# Patient Record
Sex: MALE | Race: ASIAN | NOT HISPANIC OR LATINO
[De-identification: names, ages, dates, MRNs, and addresses within clinical notes are randomized per-mention and may not be internally consistent; named-entity substitution may affect disease eponyms.]

---

## 2018-08-09 ENCOUNTER — TRANSCRIPTION ENCOUNTER (OUTPATIENT)
Age: 58
End: 2018-08-09

## 2018-08-09 ENCOUNTER — INPATIENT (INPATIENT)
Facility: HOSPITAL | Age: 58
LOS: 2 days | Discharge: HOME | End: 2018-08-12
Attending: INTERNAL MEDICINE | Admitting: INTERNAL MEDICINE
Payer: MEDICAID

## 2018-08-09 VITALS
SYSTOLIC BLOOD PRESSURE: 182 MMHG | TEMPERATURE: 98 F | RESPIRATION RATE: 18 BRPM | HEART RATE: 100 BPM | DIASTOLIC BLOOD PRESSURE: 96 MMHG | OXYGEN SATURATION: 100 %

## 2018-08-09 DIAGNOSIS — Z29.9 ENCOUNTER FOR PROPHYLACTIC MEASURES, UNSPECIFIED: ICD-10-CM

## 2018-08-09 DIAGNOSIS — R07.9 CHEST PAIN, UNSPECIFIED: ICD-10-CM

## 2018-08-09 DIAGNOSIS — E13.8 OTHER SPECIFIED DIABETES MELLITUS WITH UNSPECIFIED COMPLICATIONS: ICD-10-CM

## 2018-08-09 DIAGNOSIS — E78.00 PURE HYPERCHOLESTEROLEMIA, UNSPECIFIED: ICD-10-CM

## 2018-08-09 DIAGNOSIS — I25.110 ATHEROSCLEROTIC HEART DISEASE OF NATIVE CORONARY ARTERY WITH UNSTABLE ANGINA PECTORIS: ICD-10-CM

## 2018-08-09 DIAGNOSIS — I16.1 HYPERTENSIVE EMERGENCY: ICD-10-CM

## 2018-08-09 LAB
ALBUMIN SERPL ELPH-MCNC: 3.9 G/DL — SIGNIFICANT CHANGE UP (ref 3.3–5)
ALP SERPL-CCNC: 67 U/L — SIGNIFICANT CHANGE UP (ref 40–120)
ALT FLD-CCNC: 15 U/L — SIGNIFICANT CHANGE UP (ref 4–41)
AST SERPL-CCNC: 15 U/L — SIGNIFICANT CHANGE UP (ref 4–40)
BASOPHILS # BLD AUTO: 0.04 K/UL — SIGNIFICANT CHANGE UP (ref 0–0.2)
BASOPHILS NFR BLD AUTO: 0.4 % — SIGNIFICANT CHANGE UP (ref 0–2)
BILIRUB SERPL-MCNC: 0.2 MG/DL — SIGNIFICANT CHANGE UP (ref 0.2–1.2)
BUN SERPL-MCNC: 14 MG/DL — SIGNIFICANT CHANGE UP (ref 7–23)
CALCIUM SERPL-MCNC: 8.6 MG/DL — SIGNIFICANT CHANGE UP (ref 8.4–10.5)
CHLORIDE SERPL-SCNC: 96 MMOL/L — LOW (ref 98–107)
CO2 SERPL-SCNC: 23 MMOL/L — SIGNIFICANT CHANGE UP (ref 22–31)
CREAT SERPL-MCNC: 0.7 MG/DL — SIGNIFICANT CHANGE UP (ref 0.5–1.3)
EOSINOPHIL # BLD AUTO: 0.27 K/UL — SIGNIFICANT CHANGE UP (ref 0–0.5)
EOSINOPHIL NFR BLD AUTO: 3 % — SIGNIFICANT CHANGE UP (ref 0–6)
GLUCOSE BLDC GLUCOMTR-MCNC: 109 MG/DL — HIGH (ref 70–99)
GLUCOSE BLDC GLUCOMTR-MCNC: 126 MG/DL — HIGH (ref 70–99)
GLUCOSE BLDC GLUCOMTR-MCNC: 167 MG/DL — HIGH (ref 70–99)
GLUCOSE SERPL-MCNC: 187 MG/DL — HIGH (ref 70–99)
HBA1C BLD-MCNC: 8.3 % — HIGH (ref 4–5.6)
HCT VFR BLD CALC: 45.6 % — SIGNIFICANT CHANGE UP (ref 39–50)
HGB BLD-MCNC: 15.3 G/DL — SIGNIFICANT CHANGE UP (ref 13–17)
IMM GRANULOCYTES # BLD AUTO: 0.02 # — SIGNIFICANT CHANGE UP
IMM GRANULOCYTES NFR BLD AUTO: 0.2 % — SIGNIFICANT CHANGE UP (ref 0–1.5)
LIDOCAIN IGE QN: 30 U/L — SIGNIFICANT CHANGE UP (ref 7–60)
LYMPHOCYTES # BLD AUTO: 3.06 K/UL — SIGNIFICANT CHANGE UP (ref 1–3.3)
LYMPHOCYTES # BLD AUTO: 33.6 % — SIGNIFICANT CHANGE UP (ref 13–44)
MAGNESIUM SERPL-MCNC: 1.7 MG/DL — SIGNIFICANT CHANGE UP (ref 1.6–2.6)
MCHC RBC-ENTMCNC: 27 PG — SIGNIFICANT CHANGE UP (ref 27–34)
MCHC RBC-ENTMCNC: 33.6 % — SIGNIFICANT CHANGE UP (ref 32–36)
MCV RBC AUTO: 80.6 FL — SIGNIFICANT CHANGE UP (ref 80–100)
MONOCYTES # BLD AUTO: 0.62 K/UL — SIGNIFICANT CHANGE UP (ref 0–0.9)
MONOCYTES NFR BLD AUTO: 6.8 % — SIGNIFICANT CHANGE UP (ref 2–14)
NEUTROPHILS # BLD AUTO: 5.09 K/UL — SIGNIFICANT CHANGE UP (ref 1.8–7.4)
NEUTROPHILS NFR BLD AUTO: 56 % — SIGNIFICANT CHANGE UP (ref 43–77)
NRBC # FLD: 0 — SIGNIFICANT CHANGE UP
PHOSPHATE SERPL-MCNC: 2.5 MG/DL — SIGNIFICANT CHANGE UP (ref 2.5–4.5)
PLATELET # BLD AUTO: 209 K/UL — SIGNIFICANT CHANGE UP (ref 150–400)
PMV BLD: 11.4 FL — SIGNIFICANT CHANGE UP (ref 7–13)
POTASSIUM SERPL-MCNC: 4.2 MMOL/L — SIGNIFICANT CHANGE UP (ref 3.5–5.3)
POTASSIUM SERPL-SCNC: 4.2 MMOL/L — SIGNIFICANT CHANGE UP (ref 3.5–5.3)
PROT SERPL-MCNC: 6.9 G/DL — SIGNIFICANT CHANGE UP (ref 6–8.3)
RBC # BLD: 5.66 M/UL — SIGNIFICANT CHANGE UP (ref 4.2–5.8)
RBC # FLD: 12.3 % — SIGNIFICANT CHANGE UP (ref 10.3–14.5)
SODIUM SERPL-SCNC: 134 MMOL/L — LOW (ref 135–145)
TROPONIN T, HIGH SENSITIVITY: 22 NG/L — SIGNIFICANT CHANGE UP (ref ?–14)
TROPONIN T, HIGH SENSITIVITY: 34 NG/L — SIGNIFICANT CHANGE UP (ref ?–14)
WBC # BLD: 9.1 K/UL — SIGNIFICANT CHANGE UP (ref 3.8–10.5)
WBC # FLD AUTO: 9.1 K/UL — SIGNIFICANT CHANGE UP (ref 3.8–10.5)

## 2018-08-09 PROCEDURE — 93306 TTE W/DOPPLER COMPLETE: CPT | Mod: 26

## 2018-08-09 PROCEDURE — 93454 CORONARY ARTERY ANGIO S&I: CPT | Mod: 26

## 2018-08-09 PROCEDURE — 71046 X-RAY EXAM CHEST 2 VIEWS: CPT | Mod: 26

## 2018-08-09 RX ORDER — CARVEDILOL PHOSPHATE 80 MG/1
3.12 CAPSULE, EXTENDED RELEASE ORAL EVERY 12 HOURS
Qty: 0 | Refills: 0 | Status: DISCONTINUED | OUTPATIENT
Start: 2018-08-09 | End: 2018-08-10

## 2018-08-09 RX ORDER — HYDRALAZINE HCL 50 MG
10 TABLET ORAL ONCE
Qty: 0 | Refills: 0 | Status: COMPLETED | OUTPATIENT
Start: 2018-08-09 | End: 2018-08-09

## 2018-08-09 RX ORDER — NITROGLYCERIN 6.5 MG
5 CAPSULE, EXTENDED RELEASE ORAL
Qty: 50 | Refills: 0 | Status: DISCONTINUED | OUTPATIENT
Start: 2018-08-09 | End: 2018-08-10

## 2018-08-09 RX ORDER — NITROGLYCERIN 6.5 MG
0.4 CAPSULE, EXTENDED RELEASE ORAL ONCE
Qty: 0 | Refills: 0 | Status: COMPLETED | OUTPATIENT
Start: 2018-08-09 | End: 2018-08-09

## 2018-08-09 RX ORDER — ASPIRIN/CALCIUM CARB/MAGNESIUM 324 MG
325 TABLET ORAL DAILY
Qty: 0 | Refills: 0 | Status: DISCONTINUED | OUTPATIENT
Start: 2018-08-10 | End: 2018-08-10

## 2018-08-09 RX ORDER — GLUCAGON INJECTION, SOLUTION 0.5 MG/.1ML
1 INJECTION, SOLUTION SUBCUTANEOUS ONCE
Qty: 0 | Refills: 0 | Status: DISCONTINUED | OUTPATIENT
Start: 2018-08-09 | End: 2018-08-09

## 2018-08-09 RX ORDER — ATORVASTATIN CALCIUM 80 MG/1
80 TABLET, FILM COATED ORAL AT BEDTIME
Qty: 0 | Refills: 0 | Status: DISCONTINUED | OUTPATIENT
Start: 2018-08-09 | End: 2018-08-12

## 2018-08-09 RX ORDER — DEXTROSE 50 % IN WATER 50 %
25 SYRINGE (ML) INTRAVENOUS ONCE
Qty: 0 | Refills: 0 | Status: DISCONTINUED | OUTPATIENT
Start: 2018-08-09 | End: 2018-08-09

## 2018-08-09 RX ORDER — METOPROLOL TARTRATE 50 MG
25 TABLET ORAL
Qty: 0 | Refills: 0 | Status: DISCONTINUED | OUTPATIENT
Start: 2018-08-09 | End: 2018-08-09

## 2018-08-09 RX ORDER — INSULIN LISPRO 100/ML
VIAL (ML) SUBCUTANEOUS AT BEDTIME
Qty: 0 | Refills: 0 | Status: DISCONTINUED | OUTPATIENT
Start: 2018-08-09 | End: 2018-08-12

## 2018-08-09 RX ORDER — SODIUM CHLORIDE 9 MG/ML
1000 INJECTION, SOLUTION INTRAVENOUS
Qty: 0 | Refills: 0 | Status: DISCONTINUED | OUTPATIENT
Start: 2018-08-09 | End: 2018-08-12

## 2018-08-09 RX ORDER — POLYETHYLENE GLYCOL 3350 17 G/17G
17 POWDER, FOR SOLUTION ORAL DAILY
Qty: 0 | Refills: 0 | Status: DISCONTINUED | OUTPATIENT
Start: 2018-08-09 | End: 2018-08-12

## 2018-08-09 RX ORDER — ACETAMINOPHEN 500 MG
650 TABLET ORAL EVERY 6 HOURS
Qty: 0 | Refills: 0 | Status: DISCONTINUED | OUTPATIENT
Start: 2018-08-09 | End: 2018-08-12

## 2018-08-09 RX ORDER — NITROGLYCERIN 6.5 MG
1 CAPSULE, EXTENDED RELEASE ORAL ONCE
Qty: 0 | Refills: 0 | Status: COMPLETED | OUTPATIENT
Start: 2018-08-09 | End: 2018-08-09

## 2018-08-09 RX ORDER — DEXTROSE 50 % IN WATER 50 %
15 SYRINGE (ML) INTRAVENOUS ONCE
Qty: 0 | Refills: 0 | Status: DISCONTINUED | OUTPATIENT
Start: 2018-08-09 | End: 2018-08-09

## 2018-08-09 RX ORDER — INSULIN LISPRO 100/ML
VIAL (ML) SUBCUTANEOUS
Qty: 0 | Refills: 0 | Status: DISCONTINUED | OUTPATIENT
Start: 2018-08-09 | End: 2018-08-12

## 2018-08-09 RX ORDER — DEXTROSE 50 % IN WATER 50 %
12.5 SYRINGE (ML) INTRAVENOUS ONCE
Qty: 0 | Refills: 0 | Status: DISCONTINUED | OUTPATIENT
Start: 2018-08-09 | End: 2018-08-09

## 2018-08-09 RX ADMIN — Medication 100 MILLIGRAM(S): at 22:24

## 2018-08-09 RX ADMIN — Medication 1 INCH(S): at 11:36

## 2018-08-09 RX ADMIN — Medication 0.4 MILLIGRAM(S): at 04:29

## 2018-08-09 RX ADMIN — Medication 10 MILLIGRAM(S): at 08:15

## 2018-08-09 RX ADMIN — ATORVASTATIN CALCIUM 80 MILLIGRAM(S): 80 TABLET, FILM COATED ORAL at 22:13

## 2018-08-09 RX ADMIN — Medication 650 MILLIGRAM(S): at 22:54

## 2018-08-09 RX ADMIN — Medication 1 INCH(S): at 09:37

## 2018-08-09 RX ADMIN — Medication 1.5 MICROGRAM(S)/MIN: at 10:43

## 2018-08-09 RX ADMIN — CARVEDILOL PHOSPHATE 3.12 MILLIGRAM(S): 80 CAPSULE, EXTENDED RELEASE ORAL at 18:00

## 2018-08-09 RX ADMIN — Medication 1.5 MICROGRAM(S)/MIN: at 19:26

## 2018-08-09 RX ADMIN — Medication 650 MILLIGRAM(S): at 22:24

## 2018-08-09 RX ADMIN — Medication 25 MILLIGRAM(S): at 09:37

## 2018-08-09 NOTE — ED ADULT NURSE NOTE - NSIMPLEMENTINTERV_GEN_ALL_ED
Implemented All Universal Safety Interventions:  [place] to call system. Call bell, personal items and telephone within reach. Instruct patient to call for assistance. Room bathroom lighting operational. Non-slip footwear when patient is off stretcher. Physically safe environment: no spills, clutter or unnecessary equipment. Stretcher in lowest position, wheels locked, appropriate side rails in place.

## 2018-08-09 NOTE — ED PROVIDER NOTE - OBJECTIVE STATEMENT
58M PMH HTN, DM, HLD, ex-smoker, +FHx CAD presents with midsternal CP that woke him up from sleep 3 hours ago. No known cardiac hx. Visiting from [facility] for several months. CP lasted 3 hours a/w diaphoresis and SOB. Resolved after receiving medications by EMS. No nausea, vomiting, lightheadedness, dizziness. Reports some numbness in L 5th digit that improved. Takes antihypertensive meds; not complaint every day. 58M PMH HTN, DM, HLD, ex-smoker, +FHx CAD presents with midsternal CP that woke him up from sleep 3 hours ago. No known cardiac hx. Visiting from [facility] for several months. CP lasted 3 hours a/w diaphoresis and SOB. Resolved after receiving medications by EMS. No nausea, vomiting, lightheadedness, dizziness. Reports some numbness in L 5th digit that improved. Takes antihypertensive meds; not complaint every day.  [place]: 58M PMH HTN, DM, HLD p/w midsternal CP, awoke from sleep, lasted ~3hrs then resolved after asa 162 and SLN from EMS. EMS also noted SBP 200s. Currently asymptomatic. Associated w/ slight SOB and diaphoresis. Also minimal numbness only to L pinky, now resolved, no other weakness/numbness. Has similar intermittent episodes over alst few mos but not as severe, pt attributed them to "gas pain." Denies associated  NVD, lightheaded, palpitations, cough/rhinorrhea, black/bloody stool, LE pain/swelling, focal weakness/numbness, abd pain, urinary complaints, f/c. No prior cardiac w/u. Not always adherent to BP meds, unsure what they are.

## 2018-08-09 NOTE — DISCHARGE NOTE ADULT - OTHER SIGNIFICANT FINDINGS
8/9:  - CP 10/10 w /120 in ED, given nitro SL x2 doses and nitro ointment which reduced his CP from 10/10 to 4/10. Gave hydralazine 10mg x1, started metoprolol 25 mg bid, and atorvastatin 80mg qD. Admitted to tele to r/o ACS, then transferred to CCU. Troponin 22 --> 34, EKG concerning for inferolateral ischemia, CP improved to 2/10 on admission to CCU, however BP still elevated. Started on nitro gtt, ordered TTE, and kept NPO for possible cath  - TTE w LVEF 62%, increased relative wall thickness with normal LV mass index c/w concentric LV remodeling.      RESULTS:    TTE 8/9:  - LVEF 62%  1. Calcified trileaflet aortic valve with normal opening.  2. Normal left atrium.  LA volume index = 14 cc/m2.  3. Increased relative wall thickness with normal left ventricular mass index, consistent with concentric left ventricular remodeling.  4. Normal left ventricular systolic function. No segmental wall motion abnormalities.  5. Normal right ventricular size and function.  6. Normal tricuspid valve. No tricuspid regurgitation.  7. Normal pericardium with trace pericardial effusion.    EKG: Sinus Tachycardia @ 101 bpm TWI I, AVL, V5-V6, II, III.  Trop: 22-->34  CXR 8/9: clear lungs.

## 2018-08-09 NOTE — DISCHARGE NOTE ADULT - PLAN OF CARE
To be asymptomatic, to reduce risks factors such as hypertension, diabetes and hyperlipidemia to lower the risk of blood clots formation; and to prevent complications of coronary artery disease such as worsening chest pain, heart attack and death. Continue aspirin and Plavix, do not stop unless instructed by your physician.  Continue low salt, fat, cholesterol and carbohydrate diet. Follow up with cardiologist and primary care physician's routine appointment. To maintain a normal blood pressure to prevent heart attack, stroke and renal failure. Low sodium and fat diet, continue anti-hypertensive medications, and follow up with primary care physician. To maintain normal cholesterol levels to prevent stroke, coronary artery disease, peripheral vascular disease and heart attacks. Low fat diet, exercise daily and continue current medications. Follow up with primary care physician and cardiologist for management.

## 2018-08-09 NOTE — H&P ADULT - ASSESSMENT
57 yo male PMHx HTN, DM, and X-smoker p/w chest pain woke him up from sleep accompanied by nausea, vomiting and diaphoresis, found to have uncontrolled /120 admitted to [facility] for Unstable Angina and HTN Emergency.    +Unstable Angina-->[place], EKGs, Nitro paste, TTE  +HTN Emergency-->s/p nitro paste, will add antihypertensive med after nitro reassessment., TTE.

## 2018-08-09 NOTE — ED PROVIDER NOTE - PHYSICAL EXAMINATION
General appearance: NAD, conversant, afebrile    Eyes: anicteric sclerae, moist conjunctivae   HENT: Atraumatic; oropharynx clear with moist mucous membranes and no mucosal ulcerations; normal hard and soft palate; no pharyngeal erythema or exudate   Neck: Trachea midline;  Pulmonary: normal work of breathing.    CV: Regular Rhythm. Tachycardic; Normal S1, S2; No murmurs, rubs, or gallops. 2+ radial peripheral pulses   Abdomen: Soft, non-tender, non-distended; no masses or hepatosplenomegaly.   Extremities: No peripheral edema or extremity lymphadenopathy. 5/5 strength in all four extremities.   Skin: Normal temperature, turgor and texture; no rash, ulcers or subcutaneous nodules

## 2018-08-09 NOTE — ED PROVIDER NOTE - NS ED ROS FT
General: denies fever, chills  HENT: denies nasal congestion  Eyes: denies visual changes, blurred vision, eye discharge, eye redness  Neck: denies neck pain, neck swelling  CV: see HPI  Resp: see HPI  Abdominal: denies nausea, vomiting, diarrhea, abdominal pain, blood in stool, dark stool  MSK: denies muscle aches, bony pain, leg pain, leg swelling  Neuro: denies headaches, numbness, tingling, dizziness, lightheadedness.

## 2018-08-09 NOTE — DISCHARGE NOTE ADULT - CARE PROVIDER_API CALL
Willy Flores), Cardiovascular Disease; Internal Medicine; Interventional Cardiology  [id number] [address] [place] [address]  [place] [address]  Phone: [phone]  Fax: [phone]

## 2018-08-09 NOTE — ED PROVIDER NOTE - ATTENDING CONTRIBUTION TO CARE
58M PMH HTN, DM, HLD, FMH CAD p/w midsternal CP/diaphoresis/SOB for last few hrs, received asa/SLN, now resolved and asymptomatic. Slight tachycardia and hypertension (but improved from EMS), other vitals wnl. Exam as above. EKG w/ TWI.  Ddx: Concern for ACS. clinically not PE, tamponade, dissection, PTX, perf, myocarditis, mediastinitis.   CBC, cmp, trop. CXR. Reassess. High risk pt, likely admission for further cardiac w/u.

## 2018-08-09 NOTE — DISCHARGE NOTE ADULT - PATIENT PORTAL LINK FT
You can access the [facility] Portal, offered by [facility], by registering with the following website: [email or web address]

## 2018-08-09 NOTE — PROGRESS NOTE ADULT - SUBJECTIVE AND OBJECTIVE BOX
----- CCU ADMISSION NOTE -----    CHIEF COMPLAINT: Chest pain    HPI:   59 yo male PMHx HTN, DM, and X-smoker p/w chest pain woke him up from sleep this morning, Chest pain described as substernal chest heaviness, 10/10, non-pleuritic, non-radiating, accompanied by nausea, vomiting and diaphoresis. Chest pain constant in nature. Pt took his Aspirin 325mg this morning and came to the hospital. Pt denies fever, chills, SOB, palpitations, or abdominal pain. In ED pt found to have uncontrolled /120, was given Nitro SL x2 doses which reduced his chest pain to 4/10 and admitted to tele to r/o ACS. CE negative, EKG concerning for inferolateral ischemia, CP improved from 8/10 to 2/10, however BP still 198/110 after nitro paste and metoprolol, started on nitro gtt with plans for TTE and cath later today.      ALLERGIES:  No Known Allergies      PAST MEDICAL HISTORY:  Hypercholesteremia    Hypertension, unspecified type    Other specified diabetes mellitus with complication, without long-term current use of insulin.    PAST SURGICAL HISTORY:  No significant past surgical history.      FAMILY HISTORY:  CAD    SOCIAL HISTORY:  Pt , lives with spouse.   Former smoker. Drinks 4-5 shots of liquor on occasion.    OBJECTIVE:  Vital Signs Last 24 Hrs  T(C): 36.9 (09 Aug 2018 04:31), Max: 36.9 (09 Aug 2018 02:39)  T(F): 98.4 (09 Aug 2018 04:31), Max: 98.4 (09 Aug 2018 02:39)  HR: 94 (09 Aug 2018 09:24) (90 - 100)  BP: 198/110 (09 Aug 2018 09:24) (176/98 - 204/120)  BP(mean): --  RR: 19 (09 Aug 2018 09:24) (17 - 19)  SpO2: 100% (09 Aug 2018 09:24) (98% - 100%)  I&O's Detail      PHYSICAL EXAM:    ***      EKG 8/9/18:  Rate 101 BPM  P-R Interval 176 ms  QRS Duration 94 ms  Q-T Interval 344 ms  QTC Calculation(Bezet) 446 ms  P Axis 59 degrees  R Axis 2 degrees  T Axis 176 degrees  Diagnosis: Sinus tachycardia. T wave abnormality, consider inferolateral ischemia      HOME MEDICATIONS:    Ecotrin 325 mg oral delayed release tablet: 1 tab(s) orally once a day (09 Aug 2018 09:01)  metFORMIN 500 mg oral tablet: 1 tab(s) orally 2 times a day (09 Aug 2018 09:01)      HOSPITAL MEDICATIONS:    MEDICATIONS  (STANDING):  atorvastatin 80 milliGRAM(s) Oral at bedtime  dextrose 50% Injectable 12.5 Gram(s) IV Push once  dextrose 50% Injectable 25 Gram(s) IV Push once  dextrose 50% Injectable 25 Gram(s) IV Push once  metoprolol tartrate 25 milliGRAM(s) Oral two times a day    MEDICATIONS  (PRN):  dextrose 40% Gel 15 Gram(s) Oral once PRN Blood Glucose LESS THAN 70 milliGRAM(s)/deciliter  glucagon  Injectable 1 milliGRAM(s) IntraMuscular once PRN Glucose LESS THAN 70 milligrams/deciliter      LABS:                        15.3   9.10  )-----------( 209      ( 09 Aug 2018 04:24 )             45.6     Hgb Trend: 15.3<--    09 Aug 2018 04:24    134<L>  |  96<L>  |  14     ----------------------------<  187<H>  4.2     |  23     |  0.70     Ca    8.6        09 Aug 2018 04:24  Phos  2.5       09 Aug 2018 04:24  Mg     1.7       09 Aug 2018 04:24    TPro  6.9    /  Alb  3.9    /  TBili  0.2    /  DBili  x      /  AST  15     /  ALT  15     /  AlkPhos  67     09 Aug 2018 04:24    Troponin T, High Sensitivity: 34 --> 22      MICROBIOLOGY: None      IMAGING:    CXR 8/9/18: Clear lungs. ----- CCU ADMISSION NOTE -----    CHIEF COMPLAINT: Chest pain    HPI:   57 yo male PMHx HTN, DM, and X-smoker p/w chest pain woke him up from sleep this morning, Chest pain described as substernal chest heaviness, 10/10, non-pleuritic, non-radiating, accompanied by nausea, vomiting and diaphoresis. Chest pain constant in nature. Pt took his Aspirin 325mg this morning and came to the hospital. Pt denies fever, chills, SOB, palpitations, or abdominal pain. In ED pt found to have uncontrolled /120, was given Nitro SL x2 doses which reduced his chest pain to 4/10 and admitted to tele to r/o ACS. CE negative, EKG concerning for inferolateral ischemia, CP improved from 8/10 to 2/10, however BP still 198/110 after nitro paste and metoprolol, started on nitro gtt with plans for TTE and cath later today.      ALLERGIES:  No Known Allergies      PAST MEDICAL HISTORY:  Hypercholesteremia    Hypertension, unspecified type    Other specified diabetes mellitus with complication, without long-term current use of insulin.    PAST SURGICAL HISTORY:  No significant past surgical history.      FAMILY HISTORY:  CAD    SOCIAL HISTORY:  Pt , lives with spouse.   Former smoker. Drinks 4-5 shots of liquor on occasion.    OBJECTIVE:  Vital Signs Last 24 Hrs  T(C): 36.9 (09 Aug 2018 04:31), Max: 36.9 (09 Aug 2018 02:39)  T(F): 98.4 (09 Aug 2018 04:31), Max: 98.4 (09 Aug 2018 02:39)  HR: 94 (09 Aug 2018 09:24) (90 - 100)  BP: 198/110 (09 Aug 2018 09:24) (176/98 - 204/120)  BP(mean): --  RR: 19 (09 Aug 2018 09:24) (17 - 19)  SpO2: 100% (09 Aug 2018 09:24) (98% - 100%)  I&O's Detail    PHYSICAL EXAM:  General: Alert and cooperative. No acute stress. Well developed, well nourished.   Head: Normocephalic, no mass or lesions.  Eyes: Intact visual fields. PERRL, clear conjunctiva, EOMI, no ptosis.   Neck: No lymphadenopathy, no masses, no thyromegaly. No JVD.  Respiratory: Bilateral lungs clear to auscultation, no crackles, wheezes, or rhonchi.   Cardiovascular: S1/S2 auscultated. Regular rhythm. No murmurs, rubs or gallop. No peripheral edema, cyanosis, or pallor. Extremities warm and well perfused.  Abdomen: Soft, non-tender, nondistended, no guarding or rebound tenderness. Active bowel sounds in all 4 quadrants. No masses palpated.  Extremities: No significant deformity or joint abnormality. Peripheral pulses intact. No varicosities. No peripheral edema, atrophy.   Skin: Intact, no rash. Normal color, texture and turgor with no lesions or eruptions.  Neurological: AOAx3. CN2-12 grossly intact. Strength and sensation grossly symmetric and intact throughout.  Psychiatry: Oriented to person, place, and time. Able to demonstrate good judgement and reason, without hallucinations, abnormal affect or abnormal behaviors during the examination.      EKG 8/9/18:  Rate 101 BPM  P-R Interval 176 ms  QRS Duration 94 ms  Q-T Interval 344 ms  QTC Calculation(Bezet) 446 ms  P Axis 59 degrees  R Axis 2 degrees  T Axis 176 degrees  Diagnosis: Sinus tachycardia. T wave abnormality, consider inferolateral ischemia      HOME MEDICATIONS:    Ecotrin 325 mg oral delayed release tablet: 1 tab(s) orally once a day (09 Aug 2018 09:01)  metFORMIN 500 mg oral tablet: 1 tab(s) orally 2 times a day (09 Aug 2018 09:01)      HOSPITAL MEDICATIONS:    MEDICATIONS  (STANDING):  atorvastatin 80 milliGRAM(s) Oral at bedtime  dextrose 50% Injectable 12.5 Gram(s) IV Push once  dextrose 50% Injectable 25 Gram(s) IV Push once  dextrose 50% Injectable 25 Gram(s) IV Push once  metoprolol tartrate 25 milliGRAM(s) Oral two times a day    MEDICATIONS  (PRN):  dextrose 40% Gel 15 Gram(s) Oral once PRN Blood Glucose LESS THAN 70 milliGRAM(s)/deciliter  glucagon  Injectable 1 milliGRAM(s) IntraMuscular once PRN Glucose LESS THAN 70 milligrams/deciliter      LABS:                        15.3   9.10  )-----------( 209      ( 09 Aug 2018 04:24 )             45.6     Hgb Trend: 15.3<--    09 Aug 2018 04:24    134<L>  |  96<L>  |  14     ----------------------------<  187<H>  4.2     |  23     |  0.70     Ca    8.6        09 Aug 2018 04:24  Phos  2.5       09 Aug 2018 04:24  Mg     1.7       09 Aug 2018 04:24    TPro  6.9    /  Alb  3.9    /  TBili  0.2    /  DBili  x      /  AST  15     /  ALT  15     /  AlkPhos  67     09 Aug 2018 04:24    Troponin T, High Sensitivity: 34 --> 22      MICROBIOLOGY: None      IMAGING:    CXR 8/9/18: Clear lungs. ----- CCU ADMISSION NOTE -----    CHIEF COMPLAINT: Chest pain    HPI:   59 yo male PMHx HTN, and ex-smoker p/w chest pain woke him up from sleep this morning, chest pain described as substernal chest heaviness, 10/10, non-pleuritic, non-radiating, accompanied by nausea, vomiting and diaphoresis. Chest pain constant in nature. Pt reports feeling of chest pressure which first started 1 month ago w/o chest pain. Reports his only home mets are aspirin 325 and metformin 500mg bid, reports taking his aspirin this morning before coming to the hospital.    In ED pt found to have uncontrolled /120, was given nitro SL x2 doses and nitro ointment which reduced his chest pain to 4/10. Gave hydralazine 10mg x1, started metoprolol 25 mg bid and atorvastatin 80mg qD. Admitted to tele to r/o ACS, then transferred to CCU. Troponin 22 --> 34, EKG concerning for inferolateral ischemia, CP improved to 2/10 on admission to CCU, however BP still 198/110. Started on nitro gtt with plans for TTE and cath later today.    Pt denies fever, chills, SOB, palpitations, or abdominal pain. Denies n/v since this am. Denies prior cardiac history or cardiologist. Reports constipation.      ALLERGIES:  No Known Allergies      PAST MEDICAL HISTORY:  Hypercholesteremia    Hypertension, unspecified type    Other specified diabetes mellitus with complication, without long-term current use of insulin.      PAST SURGICAL HISTORY:  No past surgical history      SOCIAL HISTORY:  Pt , lives with spouse and daughter.   Former smoker. Drinks 4-5 shots of liquor on occasion.      OBJECTIVE:  Vital Signs Last 24 Hrs  T(C): 36.9 (09 Aug 2018 04:31), Max: 36.9 (09 Aug 2018 02:39)  T(F): 98.4 (09 Aug 2018 04:31), Max: 98.4 (09 Aug 2018 02:39)  HR: 94 (09 Aug 2018 09:24) (90 - 100)  BP: 198/110 (09 Aug 2018 09:24) (176/98 - 204/120)  BP(mean): --  RR: 19 (09 Aug 2018 09:24) (17 - 19)  SpO2: 100% (09 Aug 2018 09:24) (98% - 100%)  I&O's Detail    PHYSICAL EXAM:  General: Alert and cooperative. No acute stress. Well developed, well nourished.   Head: Normocephalic, no mass or lesions.  Eyes: Intact visual fields. PERRL, clear conjunctiva, EOMI, no ptosis.   Neck: No lymphadenopathy, no masses, no thyromegaly. No JVD.  Respiratory: Bilateral lungs clear to auscultation, no crackles, wheezes, or rhonchi.   Cardiovascular: S1/S2 auscultated. Regular rhythm. No murmurs, rubs or gallop. No peripheral edema, cyanosis, or pallor. Extremities warm and well perfused.  Abdomen: Soft, non-tender, nondistended, no guarding or rebound tenderness. Active bowel sounds in all 4 quadrants. No masses palpated.  Extremities: No significant deformity or joint abnormality. Peripheral pulses intact. No varicosities. No peripheral edema, atrophy.   Skin: Intact, no rash. Normal color, texture and turgor with no lesions or eruptions.  Neurological: AOAx3. CN2-12 grossly intact. Strength and sensation grossly symmetric and intact throughout.  Psychiatry: Oriented to person, place, and time. Able to demonstrate good judgement and reason, without hallucinations, abnormal affect or abnormal behaviors during the examination.      EKG 8/9/18:  Rate 101 BPM  P-R Interval 176 ms  QRS Duration 94 ms  Q-T Interval 344 ms  QTC Calculation(Bezet) 446 ms  P Axis 59 degrees  R Axis 2 degrees  T Axis 176 degrees  Diagnosis: Sinus tachycardia. T wave abnormality, consider inferolateral ischemia      HOME MEDICATIONS: (confirmed with patient)    Ecotrin 325 mg oral delayed release tablet: 1 tab(s) orally once a day (09 Aug 2018 09:01)  metFORMIN 500 mg oral tablet: 1 tab(s) orally 2 times a day (09 Aug 2018 09:01)      HOSPITAL MEDICATIONS:    MEDICATIONS  (STANDING):  atorvastatin 80 milliGRAM(s) Oral at bedtime  dextrose 50% Injectable 12.5 Gram(s) IV Push once  dextrose 50% Injectable 25 Gram(s) IV Push once  dextrose 50% Injectable 25 Gram(s) IV Push once  metoprolol tartrate 25 milliGRAM(s) Oral two times a day    MEDICATIONS  (PRN):  dextrose 40% Gel 15 Gram(s) Oral once PRN Blood Glucose LESS THAN 70 milliGRAM(s)/deciliter  glucagon  Injectable 1 milliGRAM(s) IntraMuscular once PRN Glucose LESS THAN 70 milligrams/deciliter      LABS:                        15.3   9.10  )-----------( 209      ( 09 Aug 2018 04:24 )             45.6     Hgb Trend: 15.3<--    09 Aug 2018 04:24    134<L>  |  96<L>  |  14     ----------------------------<  187<H>  4.2     |  23     |  0.70     Ca    8.6        09 Aug 2018 04:24  Phos  2.5       09 Aug 2018 04:24  Mg     1.7       09 Aug 2018 04:24    TPro  6.9    /  Alb  3.9    /  TBili  0.2    /  DBili  x      /  AST  15     /  ALT  15     /  AlkPhos  67     09 Aug 2018 04:24    Troponin T, High Sensitivity: 22 --> 34      MICROBIOLOGY: None      IMAGING:    CXR 8/9/18: Clear lungs.    TTE pending ----- CCU ADMISSION NOTE -----    CHIEF COMPLAINT: Chest pain    HPI:   59 yo male PMHx HTN, and ex-smoker p/w chest pain woke him up from sleep this morning, chest pain described as substernal chest heaviness, 10/10, non-pleuritic, non-radiating, accompanied by nausea, vomiting and diaphoresis. Chest pain constant in nature. Pt reports feeling of chest pressure which first started 1 month ago w/o chest pain. Reports his only home mets are aspirin 325 and metformin 500mg bid, reports taking his aspirin this morning before coming to the hospital.    In ED pt found to have uncontrolled /120, was given nitro SL x2 doses and nitro ointment which reduced his chest pain to 4/10. Gave hydralazine 10mg x1, started metoprolol 25 mg bid and atorvastatin 80mg qD. Admitted to tele to r/o ACS, then transferred to CCU. Troponin 22 --> 34, EKG concerning for inferolateral ischemia, CP improved to 2/10 on admission to CCU, however BP still elevated. Started on nitro gtt with plans for TTE and cath later today.    Pt denies fever, chills, SOB, palpitations, or abdominal pain. Denies n/v since this am. Denies prior cardiac history or cardiologist. Reports constipation.      ALLERGIES:  No Known Allergies      PAST MEDICAL HISTORY:  Hypercholesteremia    Hypertension, unspecified type    Other specified diabetes mellitus with complication, without long-term current use of insulin.      PAST SURGICAL HISTORY:  No past surgical history      SOCIAL HISTORY:  Pt , lives with spouse and daughter.   Former smoker. Drinks 4-5 shots of liquor on occasion.      OBJECTIVE:  Vital Signs Last 24 Hrs  T(C): 36.9 (09 Aug 2018 04:31), Max: 36.9 (09 Aug 2018 02:39)  T(F): 98.4 (09 Aug 2018 04:31), Max: 98.4 (09 Aug 2018 02:39)  HR: 94 (09 Aug 2018 09:24) (90 - 100)  BP: 198/110 (09 Aug 2018 09:24) (176/98 - 204/120)  BP(mean): --  RR: 19 (09 Aug 2018 09:24) (17 - 19)  SpO2: 100% (09 Aug 2018 09:24) (98% - 100%)  I&O's Detail    PHYSICAL EXAM:  General: Alert and cooperative. No acute stress. Well developed, well nourished.   Head: Normocephalic, no mass or lesions.  Eyes: Intact visual fields. PERRL, clear conjunctiva, EOMI, no ptosis.   Neck: No lymphadenopathy, no masses, no thyromegaly. No JVD.  Respiratory: Bilateral lungs clear to auscultation, no crackles, wheezes, or rhonchi.   Cardiovascular: S1/S2 auscultated. Regular rhythm. No murmurs, rubs or gallop. No peripheral edema, cyanosis, or pallor. Extremities warm and well perfused.  Abdomen: Soft, non-tender, nondistended, no guarding or rebound tenderness. Active bowel sounds in all 4 quadrants. No masses palpated.  Extremities: No significant deformity or joint abnormality. Peripheral pulses intact. No varicosities. No peripheral edema, atrophy.   Skin: Intact, no rash. Normal color, texture and turgor with no lesions or eruptions.  Neurological: AOAx3. CN2-12 grossly intact. Strength and sensation grossly symmetric and intact throughout.  Psychiatry: Oriented to person, place, and time. Able to demonstrate good judgement and reason, without hallucinations, abnormal affect or abnormal behaviors during the examination.      EKG 8/9/18:  Rate 101 BPM  P-R Interval 176 ms  QRS Duration 94 ms  Q-T Interval 344 ms  QTC Calculation(Bezet) 446 ms  P Axis 59 degrees  R Axis 2 degrees  T Axis 176 degrees  Diagnosis: Sinus tachycardia. T wave abnormality, consider inferolateral ischemia      HOME MEDICATIONS: (confirmed with patient)    Ecotrin 325 mg oral delayed release tablet: 1 tab(s) orally once a day (09 Aug 2018 09:01)  metFORMIN 500 mg oral tablet: 1 tab(s) orally 2 times a day (09 Aug 2018 09:01)      HOSPITAL MEDICATIONS:    MEDICATIONS  (STANDING):  atorvastatin 80 milliGRAM(s) Oral at bedtime  dextrose 50% Injectable 12.5 Gram(s) IV Push once  dextrose 50% Injectable 25 Gram(s) IV Push once  dextrose 50% Injectable 25 Gram(s) IV Push once  metoprolol tartrate 25 milliGRAM(s) Oral two times a day    MEDICATIONS  (PRN):  dextrose 40% Gel 15 Gram(s) Oral once PRN Blood Glucose LESS THAN 70 milliGRAM(s)/deciliter  glucagon  Injectable 1 milliGRAM(s) IntraMuscular once PRN Glucose LESS THAN 70 milligrams/deciliter      LABS:                        15.3   9.10  )-----------( 209      ( 09 Aug 2018 04:24 )             45.6     Hgb Trend: 15.3<--    09 Aug 2018 04:24    134<L>  |  96<L>  |  14     ----------------------------<  187<H>  4.2     |  23     |  0.70     Ca    8.6        09 Aug 2018 04:24  Phos  2.5       09 Aug 2018 04:24  Mg     1.7       09 Aug 2018 04:24    TPro  6.9    /  Alb  3.9    /  TBili  0.2    /  DBili  x      /  AST  15     /  ALT  15     /  AlkPhos  67     09 Aug 2018 04:24    Troponin T, High Sensitivity: 22 --> 34      MICROBIOLOGY: None      IMAGING:    CXR 8/9/18: Clear lungs.    TTE pending

## 2018-08-09 NOTE — ED PROVIDER NOTE - PMH
Hypercholesteremia    Hypertension, unspecified type    Other specified diabetes mellitus with complication, without long-term current use of insulin

## 2018-08-09 NOTE — CHART NOTE - NSCHARTNOTEFT_GEN_A_CORE
R radial band removed, good hemostasis noted, DSD in place. No evidence of hematoma. R radial pulse is 2+ palp, capillary refill < 3 sec, good ROJM.

## 2018-08-09 NOTE — DISCHARGE NOTE ADULT - SECONDARY DIAGNOSIS.
Hypertensive emergency Hypercholesteremia Other specified diabetes mellitus with complication, without long-term current use of insulin

## 2018-08-09 NOTE — H&P ADULT - RS GEN PE MLT RESP DETAILS PC
no chest wall tenderness/no rhonchi/airway patent/no rales/no wheezes/clear to auscultation bilaterally/good air movement/breath sounds equal/respirations non-labored

## 2018-08-09 NOTE — ED ADULT NURSE NOTE - OBJECTIVE STATEMENT
received pt to room 24. pt is alert and oriented x4. c/o chest pain and diaphoresis which is better after he received Nitro SL by EMS. labs sent. 20 G SL in Left AC by EMS. NSr on monitor. family at bedside. pt appears comfortable. Pt is being monitored. received pt to room 24. pt is alert and oriented x4. c/o chest pain and diaphoresis which is better after he received Nitro SL by EMS. labs sent. 20 G SL in Left AC by EMS. NSr on monitor. family at bedside. pt appears comfortable. Pt is being monitored. scleroderma noted on his body. offers no other complaints.

## 2018-08-09 NOTE — H&P ADULT - PROBLEM SELECTOR PLAN 1
tele monitor   cardiac enzymes x 2  Serial EKGs  NPO for now  continue ASA  Started Metoprolol 25mg PO BID, Lipitor 80mg QHS  TTE  consider Cardiac cath

## 2018-08-09 NOTE — DISCHARGE NOTE ADULT - MEDICATION SUMMARY - MEDICATIONS TO TAKE
I will START or STAY ON the medications listed below when I get home from the hospital:    aspirin 81 mg oral delayed release tablet  -- 1 tab(s) by mouth once a day  -- Indication: For preventative    isosorbide mononitrate 30 mg oral tablet, extended release  -- 1 tab(s) by mouth once a day  -- Indication: For CAD    metFORMIN 500 mg oral tablet  -- 1 tab(s) by mouth 2 times a day  -- Indication: For Other specified diabetes mellitus with complication, without long-term current use of insulin    atorvastatin 80 mg oral tablet  -- 1 tab(s) by mouth once a day (at bedtime)  -- Indication: For HLD    carvedilol 25 mg oral tablet  -- 1 tab(s) by mouth every 12 hours  -- Indication: For HTN

## 2018-08-09 NOTE — PATIENT PROFILE ADULT. - TEACHING/LEARNING DEVELOPMENTAL CONSIDERATIONS
Ambulatory/Rehab Services H2 Model Falls Risk Assessment    Risk Factor Pts. ·   Confusion/Disorientation/Impulsivity  []    4 ·   Symptomatic Depression  []   2 ·   Altered Elimination  []   1 ·   Dizziness/Vertigo  []   1 ·   Gender (Male)  []   1 ·   Any administered antiepileptics (anticonvulsants):  []   2 ·   Any administered benzodiazepines:  []   1 ·   Visual Impairment (specify):  []   1 ·   Portable Oxygen Use  []   1 ·   Orthostatic ? BP  []   1 ·   History of Recent Falls (within 3 mos.)  []   5     Ability to Rise from Chair (choose one) Pts. ·   Ability to rise in a single movement  [x]   0 ·   Pushes up, successful in one attempt  []   1 ·   Multiple attempts, but successful  []   3 ·   Unable to rise without assistance  []   4   Total: (5 or greater = High Risk) 0     Falls Prevention Plan:   []                Physical Limitations to Exercise (specify):   []                Mobility Assistance Device (type):   []                Exercise/Equipment Adaptation (specify):    ©2010 [facility] [address] [place] [address] Patent #[id number].  Federal Law prohibits the replication, distribution or use without written permission from [facility] [employer] none

## 2018-08-09 NOTE — DISCHARGE NOTE ADULT - MEDICATION SUMMARY - MEDICATIONS TO CHANGE
I will SWITCH the dose or number of times a day I take the medications listed below when I get home from the hospital:    Ecotrin 325 mg oral delayed release tablet  -- 1 tab(s) by mouth once a day

## 2018-08-09 NOTE — ED ADULT TRIAGE NOTE - CHIEF COMPLAINT QUOTE
PT brought in by EMS from home for CP, diaphoresis that woke him from sleep. PMH HTN, DM. As per EMS Pt hypertensive en route with SBP in 200's: given 1 sublingual nitro and 162 aspirin by EMS en route. Pt received with 20 G IV to left arm from EMS.

## 2018-08-09 NOTE — DISCHARGE NOTE ADULT - HOSPITAL COURSE
57 yo male PMHx HTN, DM, and X-smoker p/w chest pain woke him up from sleep this morning, Chest pain described as substernal chest heaviness, 10/10, non-pleuritic, non-radiating, accompanied by nausea, vomiting and diaphoresis. Chest pain constant in nature. Pt took his Aspirin 325mg and came to the hospital. Pt denied fever, chills, SOB, palpitations, or abdominal pain. In ED pt found to have uncontrolled /120, was given Nitro SL x2 doses which reduced his chest pain to 4/10 and admitted to tele to r/o ACS. Trop 22 --> 34, EKG concerning for inferolateral ischemia, CP improved to 2/10, however BP still 198/110 after nitro paste and metoprolol, started on nitro gtt with plans for TTE and cath.    Cath performed on 8/**/18 revealed ***    Pt remained afebrile and hemodynamically stable, and was discharged 8/**/18 with plans to... *** 59 yo male PMHx HTN, DM, former smoker a/w  Unstable Angina and HTN Emergency:   admitted with hypertensive urgency and unstable angina s/p cath 8/9:     -- TTE with normal LV function with no significant valve disease  -- Cath noted - patient with moderate multivessel disease not amenable to stenting  -- no more angina  -- BP improved  - switch to Imdur 30 mg PO dialy  -- HD stable no evidence of CHF  -- Plan to D/C today 8/12/18 and f/u with cardiology clinic at [facility]

## 2018-08-09 NOTE — H&P ADULT - HISTORY OF PRESENT ILLNESS
59 yo male PMHx HTN, DM, and X-smoker p/w chest pain woke him up from sleep this morning, Chest pain described as substernal chest heaviness, 10/10, non-pleuritic, non-radiating, accompanied by nausea, vomiting and diaphoresis. Chest pain constant in nature. Pt took his Aspirin 325mg this morning and came to the hospital. Pt denies fever, chills, SOB, palpitations, or abdominal pain.     In ED pt found to have uncontrolled /120, was given Nitro SL x2 doses which reduced his chest pain to 4/10 and admitted to tele to r/o ACS.     On admission, pt still diaphoretic and c/o chest pain 4/10.

## 2018-08-09 NOTE — H&P ADULT - ATTENDING COMMENTS
Patient seen and examined.  Agree with above.   -admitted with hypertensive urgency and unstable angina  -admit to ccu  -slowly lower bp over next 24 hours  -hep gtt  -check tte  -ecg suggestive of multivessel cad - plan for cath later today    Ayaan Carranza MD

## 2018-08-09 NOTE — PROGRESS NOTE ADULT - ASSESSMENT
59 yo male PMHx HTN, DM, and X-smoker p/w chest pain woke him up from sleep accompanied by nausea, vomiting and diaphoresis, found to have uncontrolled /120 admitted to tele for unstable angina and hypertensive emergency, still hypertensive after nitro paste and home metroprolol 25, transferred to CCU and started on nitro gtt with plans for TTE and cath later today.    Unstable angina pectoris due to coronary arteriosclerosis. EKG w possible inferolateral ischemia, however CE 34 --> 22.    - start nitro gtt  - monitor telemetry  - serial EKGs  - NPO for now  - continue ASA  - started Metoprolol 25mg PO BID, Lipitor 80mg QHS  - TTE  - Cardiac cath    Hypertensive emergency  - Monitor BP  - DASH diet  - s/p Nitro paste and Metoprolol 25mg PO BID    DM2 - not on insulin  - Monitor FS  - ISS  - F/U HbA1C    HLD  - serum FLP  - started on Lipitor 80mg    Prophylactic measures:  - DVT ppx: IRAM stockings. 59 yo male PMHx HTN, DM, and X-smoker p/w chest pain woke him up from sleep accompanied by nausea, vomiting and diaphoresis, found to have uncontrolled /120 admitted to tele for unstable angina and hypertensive emergency, still hypertensive after nitro paste and home metroprolol 25, transferred to CCU and started on nitro gtt with plans for TTE and cath later today.    Unstable angina pectoris due to coronary arteriosclerosis. EKG w possible inferolateral ischemia, however CE 34 --> 22.    - start nitro gtt  - monitor telemetry  - serial EKGs  - NPO for now  - continue ASA  - started Metoprolol 25mg PO BID, Lipitor 80mg QHS  - TTE  - Cardiac cath    Hypertensive emergency  - Monitor BP  - DASH diet  - s/p Nitro paste and Metoprolol 25mg PO BID    DM2 - not on insulin  - Monitor FS  - ISS  - F/U HbA1C    HLD  - serum FLP  - started on Lipitor 80mg    Prophylactic measures:  - DVT ppx: IRAM stockings  - Diet: NPO for possible cath today 59 yo male PMH of HTN, HLD, DM2, and ex-smoker p/w chest pain that woke him up from sleep accompanied by nausea, vomiting and diaphoresis, found to have uncontrolled /120 admitted to [facility] for unstable angina and hypertensive emergency, still hypertensive after nitro sublingual, nitro paste, and home metroprolol 25 bid, transferred to CCU and started on nitro gtt with plans for TTE and possible cath later today.    Unstable angina pectoris due to coronary arteriosclerosis. EKG w possible inferolateral ischemia, CE 22 --> 34. Lipase wnl, CXR wnl.  - NPO for possible cardiac cath today  - start nitro gtt at 5 mcg/min  - c/w home aspirin 325mg  - c/w Metoprolol 25mg PO BID  - c/w Atorvastatin 80mg QHS  - TTE today  - monitor telemetry  - repeat EKG prn chest pain  - lipid panel in am    Hypertensive emergency - /120 on admission. CXR w clear lungs.  - start nitro gtt  - c/w Metoprolol 25mg PO BID  - monitor BP  - DASH diet  - thyroid studies in am    DM2 - on metformin. HbA1C 8.3%  - ISS  - monitor FS    HLD  - c/w Lipitor 80mg  - lipid panel in am    Constipation  - start miralax    Prophylactic measures:  - DVT ppx: SCDs  - Diet: DASH, NPO for possible cath today

## 2018-08-09 NOTE — ED PROVIDER NOTE - PROGRESS NOTE DETAILS
Klepfish: Pain slightly returned. Given SL, repeat EKG unchanged. Klepfish: Currently asymptomatic. d/w tele DOD, text paged tele pa, updated pt/family.

## 2018-08-09 NOTE — DISCHARGE NOTE ADULT - CARE PLAN
Principal Discharge DX:	Unstable angina pectoris due to coronary arteriosclerosis  Goal:	To be asymptomatic, to reduce risks factors such as hypertension, diabetes and hyperlipidemia to lower the risk of blood clots formation; and to prevent complications of coronary artery disease such as worsening chest pain, heart attack and death.  Assessment and plan of treatment:	Continue aspirin and Plavix, do not stop unless instructed by your physician.  Continue low salt, fat, cholesterol and carbohydrate diet. Follow up with cardiologist and primary care physician's routine appointment.  Secondary Diagnosis:	Hypertensive emergency  Goal:	To maintain a normal blood pressure to prevent heart attack, stroke and renal failure.  Assessment and plan of treatment:	Low sodium and fat diet, continue anti-hypertensive medications, and follow up with primary care physician.  Secondary Diagnosis:	Hypercholesteremia  Goal:	To maintain normal cholesterol levels to prevent stroke, coronary artery disease, peripheral vascular disease and heart attacks.  Assessment and plan of treatment:	Low fat diet, exercise daily and continue current medications. Follow up with primary care physician and cardiologist for management.  Secondary Diagnosis:	Other specified diabetes mellitus with complication, without long-term current use of insulin

## 2018-08-10 LAB
ALBUMIN SERPL ELPH-MCNC: 3.7 G/DL — SIGNIFICANT CHANGE UP (ref 3.3–5)
ALP SERPL-CCNC: 62 U/L — SIGNIFICANT CHANGE UP (ref 40–120)
ALT FLD-CCNC: 11 U/L — SIGNIFICANT CHANGE UP (ref 4–41)
AST SERPL-CCNC: 14 U/L — SIGNIFICANT CHANGE UP (ref 4–40)
BILIRUB SERPL-MCNC: 0.6 MG/DL — SIGNIFICANT CHANGE UP (ref 0.2–1.2)
BUN SERPL-MCNC: 11 MG/DL — SIGNIFICANT CHANGE UP (ref 7–23)
CALCIUM SERPL-MCNC: 8.6 MG/DL — SIGNIFICANT CHANGE UP (ref 8.4–10.5)
CHLORIDE SERPL-SCNC: 100 MMOL/L — SIGNIFICANT CHANGE UP (ref 98–107)
CHOLEST SERPL-MCNC: 138 MG/DL — SIGNIFICANT CHANGE UP (ref 120–199)
CO2 SERPL-SCNC: 21 MMOL/L — LOW (ref 22–31)
CREAT SERPL-MCNC: 0.7 MG/DL — SIGNIFICANT CHANGE UP (ref 0.5–1.3)
GLUCOSE BLDC GLUCOMTR-MCNC: 105 MG/DL — HIGH (ref 70–99)
GLUCOSE BLDC GLUCOMTR-MCNC: 132 MG/DL — HIGH (ref 70–99)
GLUCOSE BLDC GLUCOMTR-MCNC: 143 MG/DL — HIGH (ref 70–99)
GLUCOSE BLDC GLUCOMTR-MCNC: 210 MG/DL — HIGH (ref 70–99)
GLUCOSE SERPL-MCNC: 142 MG/DL — HIGH (ref 70–99)
HCT VFR BLD CALC: 47.4 % — SIGNIFICANT CHANGE UP (ref 39–50)
HCV AB S/CO SERPL IA: 0.23 S/CO — SIGNIFICANT CHANGE UP
HCV AB SERPL-IMP: SIGNIFICANT CHANGE UP
HDLC SERPL-MCNC: 33 MG/DL — LOW (ref 35–55)
HGB BLD-MCNC: 15.9 G/DL — SIGNIFICANT CHANGE UP (ref 13–17)
LIPID PNL WITH DIRECT LDL SERPL: 93 MG/DL — SIGNIFICANT CHANGE UP
MAGNESIUM SERPL-MCNC: 1.9 MG/DL — SIGNIFICANT CHANGE UP (ref 1.6–2.6)
MCHC RBC-ENTMCNC: 26.6 PG — LOW (ref 27–34)
MCHC RBC-ENTMCNC: 33.5 % — SIGNIFICANT CHANGE UP (ref 32–36)
MCV RBC AUTO: 79.3 FL — LOW (ref 80–100)
NRBC # FLD: 0 — SIGNIFICANT CHANGE UP
PHOSPHATE SERPL-MCNC: 3.4 MG/DL — SIGNIFICANT CHANGE UP (ref 2.5–4.5)
PLATELET # BLD AUTO: 228 K/UL — SIGNIFICANT CHANGE UP (ref 150–400)
PMV BLD: 11.7 FL — SIGNIFICANT CHANGE UP (ref 7–13)
POTASSIUM SERPL-MCNC: 4.1 MMOL/L — SIGNIFICANT CHANGE UP (ref 3.5–5.3)
POTASSIUM SERPL-SCNC: 4.1 MMOL/L — SIGNIFICANT CHANGE UP (ref 3.5–5.3)
PROT SERPL-MCNC: 6.8 G/DL — SIGNIFICANT CHANGE UP (ref 6–8.3)
RBC # BLD: 5.98 M/UL — HIGH (ref 4.2–5.8)
RBC # FLD: 12.3 % — SIGNIFICANT CHANGE UP (ref 10.3–14.5)
SODIUM SERPL-SCNC: 135 MMOL/L — SIGNIFICANT CHANGE UP (ref 135–145)
T3 SERPL-MCNC: 109.9 NG/DL — SIGNIFICANT CHANGE UP (ref 80–200)
T4 AB SER-ACNC: 6.2 UG/DL — SIGNIFICANT CHANGE UP (ref 5.1–13)
TRIGL SERPL-MCNC: 121 MG/DL — SIGNIFICANT CHANGE UP (ref 10–149)
TSH SERPL-MCNC: 2.34 UIU/ML — SIGNIFICANT CHANGE UP (ref 0.27–4.2)
WBC # BLD: 8.13 K/UL — SIGNIFICANT CHANGE UP (ref 3.8–10.5)
WBC # FLD AUTO: 8.13 K/UL — SIGNIFICANT CHANGE UP (ref 3.8–10.5)

## 2018-08-10 RX ORDER — CARVEDILOL PHOSPHATE 80 MG/1
6.25 CAPSULE, EXTENDED RELEASE ORAL EVERY 12 HOURS
Qty: 0 | Refills: 0 | Status: DISCONTINUED | OUTPATIENT
Start: 2018-08-10 | End: 2018-08-10

## 2018-08-10 RX ORDER — ISOSORBIDE DINITRATE 5 MG/1
5 TABLET ORAL THREE TIMES A DAY
Qty: 0 | Refills: 0 | Status: DISCONTINUED | OUTPATIENT
Start: 2018-08-10 | End: 2018-08-12

## 2018-08-10 RX ORDER — CARVEDILOL PHOSPHATE 80 MG/1
3.12 CAPSULE, EXTENDED RELEASE ORAL ONCE
Qty: 0 | Refills: 0 | Status: DISCONTINUED | OUTPATIENT
Start: 2018-08-10 | End: 2018-08-10

## 2018-08-10 RX ORDER — ISOSORBIDE DINITRATE 5 MG/1
5 TABLET ORAL THREE TIMES A DAY
Qty: 0 | Refills: 0 | Status: DISCONTINUED | OUTPATIENT
Start: 2018-08-10 | End: 2018-08-10

## 2018-08-10 RX ORDER — ASPIRIN/CALCIUM CARB/MAGNESIUM 324 MG
81 TABLET ORAL DAILY
Qty: 0 | Refills: 0 | Status: DISCONTINUED | OUTPATIENT
Start: 2018-08-10 | End: 2018-08-10

## 2018-08-10 RX ORDER — CARVEDILOL PHOSPHATE 80 MG/1
12.5 CAPSULE, EXTENDED RELEASE ORAL EVERY 12 HOURS
Qty: 0 | Refills: 0 | Status: DISCONTINUED | OUTPATIENT
Start: 2018-08-10 | End: 2018-08-11

## 2018-08-10 RX ORDER — CARVEDILOL PHOSPHATE 80 MG/1
6.25 CAPSULE, EXTENDED RELEASE ORAL ONCE
Qty: 0 | Refills: 0 | Status: COMPLETED | OUTPATIENT
Start: 2018-08-10 | End: 2018-08-10

## 2018-08-10 RX ORDER — ASPIRIN/CALCIUM CARB/MAGNESIUM 324 MG
81 TABLET ORAL DAILY
Qty: 0 | Refills: 0 | Status: DISCONTINUED | OUTPATIENT
Start: 2018-08-11 | End: 2018-08-12

## 2018-08-10 RX ADMIN — ISOSORBIDE DINITRATE 5 MILLIGRAM(S): 5 TABLET ORAL at 21:08

## 2018-08-10 RX ADMIN — CARVEDILOL PHOSPHATE 12.5 MILLIGRAM(S): 80 CAPSULE, EXTENDED RELEASE ORAL at 17:19

## 2018-08-10 RX ADMIN — Medication 2: at 12:58

## 2018-08-10 RX ADMIN — Medication 325 MILLIGRAM(S): at 11:05

## 2018-08-10 RX ADMIN — ISOSORBIDE DINITRATE 5 MILLIGRAM(S): 5 TABLET ORAL at 06:42

## 2018-08-10 RX ADMIN — CARVEDILOL PHOSPHATE 6.25 MILLIGRAM(S): 80 CAPSULE, EXTENDED RELEASE ORAL at 06:42

## 2018-08-10 RX ADMIN — ATORVASTATIN CALCIUM 80 MILLIGRAM(S): 80 TABLET, FILM COATED ORAL at 21:08

## 2018-08-10 RX ADMIN — ISOSORBIDE DINITRATE 5 MILLIGRAM(S): 5 TABLET ORAL at 14:47

## 2018-08-10 NOTE — PROGRESS NOTE ADULT - ATTENDING COMMENTS
Patient seen and examined.  Agree with above.   -continue with medical therapy of cad  -will uptitrate antianginals as needed/tolerated    Ayaan Carranza MD

## 2018-08-10 NOTE — PROGRESS NOTE ADULT - ASSESSMENT
57 yo male PMH of HTN, HLD, DM2, and ex-smoker p/w chest pain that woke him up from sleep accompanied by nausea, vomiting and diaphoresis, found to have uncontrolled /120 admitted to [facility] for unstable angina and hypertensive emergency, still hypertensive after nitro sublingual, nitro paste, and home metroprolol 25 bid, transferred to CCU and started on nitro gtt with plans for TTE and possible cath later today.    Unstable angina pectoris due to coronary arteriosclerosis. EKG w possible inferolateral ischemia, CE 22 --> 34. Lipase wnl, CXR wnl.  - NPO for possible cardiac cath today  - start nitro gtt at 5 mcg/min  - c/w home aspirin 325mg  - c/w Metoprolol 25mg PO BID  - c/w Atorvastatin 80mg QHS  - TTE today  - monitor telemetry  - repeat EKG prn chest pain  - lipid panel in am    Hypertensive emergency - /120 on admission. CXR w clear lungs.  - start nitro gtt  - c/w Metoprolol 25mg PO BID  - monitor BP  - DASH diet  - thyroid studies in am    DM2 - on metformin. HbA1C 8.3%  - ISS  - monitor FS    HLD  - c/w Lipitor 80mg  - lipid panel in am    Constipation  - start miralax    Prophylactic measures:  - DVT ppx: SCDs  - Diet: DASH, NPO for possible cath today 59 yo male PMH of HTN, HLD, DM2, and ex-smoker p/w chest pain that woke him up from sleep accompanied by nausea, vomiting and diaphoresis, found to have uncontrolled /120 admitted to [facility] for unstable angina and hypertensive emergency, still hypertensive after nitro sublingual, nitro paste, and home metroprolol 25 bid, transferred to CCU and started on nitro gtt, now s/p cath on 8/9 which revealed diffuse multivessel disease, no stents placed, planning for medical management.    Unstable angina pectoris due to coronary arteriosclerosis. EKG w possible inferolateral ischemia, CE 22 --> 34. Lipase, lipid panel, thryoid panel, and CXR all wnl. TTE w LVEF 64% and evidence of concentric LV remodeling. s/p cath 8/10 w severe multivessel disease (see results above). Planning for medical management.  - decrease home aspirin 325mg qD to 81mg qD  - increase carvedilol to 12.5mg BID  - c/w Atorvastatin 80mg QHS  - monitor telemetry  - repeat EKG prn chest pain    Hypertensive emergency - /120 on admission. CXR w clear lungs.  - start nitro gtt  - c/w Metoprolol 25mg PO BID  - monitor BP  - DASH diet    DM2 - on metformin. HbA1C 8.3%  - ISS  - monitor FS    HLD - lipid panel negative  - c/w atorvastatin 80mg for CAD    Constipation  - c/w miralax    Prophylactic measures:  - DVT ppx: SCDs  - Diet: DASH, NPO for possible cath today 57 yo male PMH of HTN, HLD, DM2, and ex-smoker p/w chest pain that woke him up from sleep accompanied by nausea, vomiting and diaphoresis, found to have uncontrolled /120 admitted to [facility] for unstable angina and hypertensive emergency, still hypertensive after nitro sublingual, nitro paste, and home metroprolol 25 bid, transferred to CCU and started on nitro gtt, now s/p cath on 8/9 which revealed diffuse multivessel disease, no stents placed, planning for medical management.    Unstable angina pectoris due to coronary arteriosclerosis. EKG w possible inferolateral ischemia, CE 22 --> 34. Lipase, lipid panel, thryoid panel, and CXR all wnl. TTE w LVEF 64% and evidence of concentric LV remodeling. s/p cath 8/10 w severe multivessel disease (see results above). Planning for medical management.  - decrease home aspirin 325mg qD to 81mg qD  - increase carvedilol to 12.5mg BID  - c/w atorvastatin 80mg QHS  - monitor telemetry  - repeat EKG prn chest pain    Hypertensive emergency - /120 on admission, now off nitro gtt. CXR w clear lungs.  - increase carvedilol to 12.5mg BID  - monitor BP  - DASH diet    DM2 - on metformin. HbA1C 8.3%  - ISS  - monitor FS    HLD - lipid panel negative  - c/w atorvastatin 80mg for CAD    Constipation  - c/w miralax    Prophylactic measures:  - DVT ppx: SCDs  - Diet: DASH

## 2018-08-10 NOTE — PROGRESS NOTE ADULT - SUBJECTIVE AND OBJECTIVE BOX
Patient with no anginal chest pain or shortness of breath  ROS otherwise negative       MEDICATIONS  (STANDING):  aspirin enteric coated 325 milliGRAM(s) Oral daily  atorvastatin 80 milliGRAM(s) Oral at bedtime  carvedilol 6.25 milliGRAM(s) Oral every 12 hours  dextrose 5%. 1000 milliLiter(s) (50 mL/Hr) IV Continuous <Continuous>  insulin lispro (HumaLOG) corrective regimen sliding scale   SubCutaneous three times a day before meals  insulin lispro (HumaLOG) corrective regimen sliding scale   SubCutaneous at bedtime  isosorbide   dinitrate Tablet (ISORDIL) 5 milliGRAM(s) Oral three times a day  polyethylene glycol 3350 17 Gram(s) Oral daily    MEDICATIONS  (PRN):  acetaminophen   Tablet. 650 milliGRAM(s) Oral every 6 hours PRN Moderate Pain (4 - 6)      LABS:                        15.9   8.13  )-----------( 228      ( 10 Aug 2018 05:45 )             47.4       08-10    135  |  100  |  11  ----------------------------<  142<H>  4.1   |  21<L>  |  0.70    Ca    8.6      10 Aug 2018 05:45  Phos  3.4     08-10  Mg     1.9     08-10    TPro  6.8  /  Alb  3.7  /  TBili  0.6  /  DBili  x   /  AST  14  /  ALT  11  /  AlkPhos  62  08-10      PHYSICAL EXAM:  Vital Signs Last 24 Hrs  T(C): 36.8 (10 Aug 2018 08:13), Max: 37.2 (09 Aug 2018 10:28)  T(F): 98.3 (10 Aug 2018 08:13), Max: 98.9 (09 Aug 2018 10:28)  HR: 80 (10 Aug 2018 10:00) (69 - 91)  BP: 132/85 (10 Aug 2018 10:00) (122/86 - 187/115)  BP(mean): 96 (10 Aug 2018 10:00) (88 - 133)  RR: 12 (10 Aug 2018 10:00) (10 - 24)  SpO2: 95% (10 Aug 2018 10:00) (95% - 99%)    HEENT: Normal Oral mucosa, PERRL, EOMI  Lymphatic: No obvious lymphadenopathy, No edema ; right wrist soft  NT + radial pulse  Cardiovascular: Normal S1S2, No JVD, 1/6 AGUSTÍN, Peripheral pulses palpable 2+ B/L  Respiratory: Lungs clear to auscultation, normal effort  Gastrointestinal: Abdomen soft, ND, NT, +BS  Skin: Warm, dry, intact. No cyanosis, No rash.  Musculoskeletal: Normal ROM, normal strength  Psychiatric: Appropriate Mood and Affect      DIAGNOSTIC DATA  TELEMETRY: NSR no events    RADIOLOGY:   Xray Chest 2 Views PA/Lat (08.09.18 @ 04:49) >  IMPRESSION:     Clear lungs.         Transthoracic Echocardiogram (08.09.18 @ 12:54) >  CONCLUSIONS:  1. Calcified trileaflet aortic valve with normal opening.  2. Normal left atrium.  LA volume index = 14 cc/m2.  3. Increased relative wall thickness with normal left  ventricular mass index, consistent with concentric left  ventricular remodeling.  4. Normal left ventricular systolic function. No segmental  wall motion abnormalities.  5. Normal right ventricular size and function.  6. Normal tricuspid valve. No tricuspid regurgitation.  7. Normal pericardium with trace pericardial effusion.  ------------------------------------------------------------------------  Confirmed on  8/9/2018 - 14:48:03 by HEIDI Verde      < from: Cardiac Cath Lab - Adult (08.09.18 @ 16:26) >  CORONARY VESSELS: The coronary circulation is right dominant.  LM:   --  Distal left main: There was a discrete 20 % stenosis.  LAD:   --  Proximal LAD: There was a tubular 20 % stenosis.  --  Mid LAD: There was a tubular 40 % stenosis.  --  Distal LAD: There was a tubular 70 % stenosis in the proximal third of  the vessel segment. There was REECE grade 3 flow through the vessel (brisk  flow).  --  D1: The vessel was small sized. There was a tubular 80 % stenosis in  the proximal third of the vessel segment. There was REECE grade 3 flow  through the vessel (brisk flow). In a second lesion, there was a discrete  90 % stenosis in the middle third of the vessel segment. There was REECE  grade 3 flow through the vessel (brisk flow).  CX:   --  Mid circumflex: There was a tubular 80 % stenosis. There was REECE  grade 3 flow through the vessel (brisk flow) and a small vascular  territory distal to the lesion.  --  OM1: There was a discrete 80 % stenosis at the ostium of the vessel  segment. There was REECE grade 3 flow through the vessel (brisk flow).  RCA:   --  Proximal RCA: There was a tubular 60 % stenosis. There was REECE  grade 3 flow through the vessel (brisk flow).  --  Distal RCA: There was a discrete 40 % stenosis in the distal third of  the vessel segment.  --  RPDA: There was a 100 % stenosis in the middle third of the vessel  segment. There was REECE grade 0 flow through the vessel (no flow).  --  RPL1: There was a tubular 50 % stenosis in the proximal third of the  vessel segment. There was REECE grade 3 flow through the vessel (brisk  flow).  COMPLICATIONS: No complications occurred during the cath lab visit.  DIAGNOSTIC RECOMMENDATIONS: The patient's anti-anginal regimen should be  further intensified. Patient management should include aggressive medical  therapy.      ASSESSMENT AND PLAN:    59 yo male PMHx HTN, DM, former smoker a/w  Unstable Angina and HTN Emergency:   admitted with hypertensive urgency and unstable angina s/p cath 8/9:     -- TTE with normal LV function with no significant valve disease  -- Cath noted - patient with moderate multivessel disease not amenable to stenting     -medical management with asa/statin/coreg/isosoridil  - will d/w attending re: antianginal regimen   -- HTN emergency - BP improving slowly (SBP 150s today)  -- HD stable no evidence of CHF  -- CCU care appreciated

## 2018-08-11 LAB
BUN SERPL-MCNC: 15 MG/DL — SIGNIFICANT CHANGE UP (ref 7–23)
CALCIUM SERPL-MCNC: 8.8 MG/DL — SIGNIFICANT CHANGE UP (ref 8.4–10.5)
CHLORIDE SERPL-SCNC: 100 MMOL/L — SIGNIFICANT CHANGE UP (ref 98–107)
CO2 SERPL-SCNC: 21 MMOL/L — LOW (ref 22–31)
CREAT SERPL-MCNC: 0.77 MG/DL — SIGNIFICANT CHANGE UP (ref 0.5–1.3)
GLUCOSE BLDC GLUCOMTR-MCNC: 126 MG/DL — HIGH (ref 70–99)
GLUCOSE BLDC GLUCOMTR-MCNC: 157 MG/DL — HIGH (ref 70–99)
GLUCOSE BLDC GLUCOMTR-MCNC: 160 MG/DL — HIGH (ref 70–99)
GLUCOSE BLDC GLUCOMTR-MCNC: 165 MG/DL — HIGH (ref 70–99)
GLUCOSE SERPL-MCNC: 152 MG/DL — HIGH (ref 70–99)
HCT VFR BLD CALC: 46.1 % — SIGNIFICANT CHANGE UP (ref 39–50)
HGB BLD-MCNC: 15.5 G/DL — SIGNIFICANT CHANGE UP (ref 13–17)
MCHC RBC-ENTMCNC: 26.7 PG — LOW (ref 27–34)
MCHC RBC-ENTMCNC: 33.6 % — SIGNIFICANT CHANGE UP (ref 32–36)
MCV RBC AUTO: 79.5 FL — LOW (ref 80–100)
NRBC # FLD: 0 — SIGNIFICANT CHANGE UP
PLATELET # BLD AUTO: 243 K/UL — SIGNIFICANT CHANGE UP (ref 150–400)
PMV BLD: 12.1 FL — SIGNIFICANT CHANGE UP (ref 7–13)
POTASSIUM SERPL-MCNC: 4.1 MMOL/L — SIGNIFICANT CHANGE UP (ref 3.5–5.3)
POTASSIUM SERPL-SCNC: 4.1 MMOL/L — SIGNIFICANT CHANGE UP (ref 3.5–5.3)
RBC # BLD: 5.8 M/UL — SIGNIFICANT CHANGE UP (ref 4.2–5.8)
RBC # FLD: 12.5 % — SIGNIFICANT CHANGE UP (ref 10.3–14.5)
SODIUM SERPL-SCNC: 136 MMOL/L — SIGNIFICANT CHANGE UP (ref 135–145)
WBC # BLD: 8.2 K/UL — SIGNIFICANT CHANGE UP (ref 3.8–10.5)
WBC # FLD AUTO: 8.2 K/UL — SIGNIFICANT CHANGE UP (ref 3.8–10.5)

## 2018-08-11 RX ORDER — CARVEDILOL PHOSPHATE 80 MG/1
25 CAPSULE, EXTENDED RELEASE ORAL EVERY 12 HOURS
Qty: 0 | Refills: 0 | Status: DISCONTINUED | OUTPATIENT
Start: 2018-08-11 | End: 2018-08-12

## 2018-08-11 RX ADMIN — CARVEDILOL PHOSPHATE 12.5 MILLIGRAM(S): 80 CAPSULE, EXTENDED RELEASE ORAL at 05:16

## 2018-08-11 RX ADMIN — Medication 1: at 12:55

## 2018-08-11 RX ADMIN — CARVEDILOL PHOSPHATE 25 MILLIGRAM(S): 80 CAPSULE, EXTENDED RELEASE ORAL at 17:40

## 2018-08-11 RX ADMIN — ATORVASTATIN CALCIUM 80 MILLIGRAM(S): 80 TABLET, FILM COATED ORAL at 21:24

## 2018-08-11 RX ADMIN — ISOSORBIDE DINITRATE 5 MILLIGRAM(S): 5 TABLET ORAL at 14:11

## 2018-08-11 RX ADMIN — ISOSORBIDE DINITRATE 5 MILLIGRAM(S): 5 TABLET ORAL at 05:16

## 2018-08-11 RX ADMIN — Medication 1: at 08:56

## 2018-08-11 RX ADMIN — Medication 200 MILLIGRAM(S): at 21:24

## 2018-08-11 RX ADMIN — Medication 81 MILLIGRAM(S): at 14:11

## 2018-08-11 RX ADMIN — ISOSORBIDE DINITRATE 5 MILLIGRAM(S): 5 TABLET ORAL at 21:24

## 2018-08-11 NOTE — PROGRESS NOTE ADULT - SUBJECTIVE AND OBJECTIVE BOX
Patient denies active CP, SOB Review of systems otherwise (-)    acetaminophen   Tablet. 650 milliGRAM(s) Oral every 6 hours PRN  aspirin enteric coated 81 milliGRAM(s) Oral daily  atorvastatin 80 milliGRAM(s) Oral at bedtime  carvedilol 12.5 milliGRAM(s) Oral every 12 hours  dextrose 5%. 1000 milliLiter(s) IV Continuous <Continuous>  insulin lispro (HumaLOG) corrective regimen sliding scale   SubCutaneous three times a day before meals  insulin lispro (HumaLOG) corrective regimen sliding scale   SubCutaneous at bedtime  isosorbide   dinitrate Tablet (ISORDIL) 5 milliGRAM(s) Oral three times a day  polyethylene glycol 3350 17 Gram(s) Oral daily                            15.5   8.20  )-----------( 243      ( 11 Aug 2018 06:04 )             46.1       Hemoglobin: 15.5 g/dL (08-11 @ 06:04)  Hemoglobin: 15.9 g/dL (08-10 @ 05:45)  Hemoglobin: 15.3 g/dL (08-09 @ 04:24)      08-11    136  |  100  |  15  ----------------------------<  152<H>  4.1   |  21<L>  |  0.77    Ca    8.8      11 Aug 2018 06:04  Phos  3.4     08-10  Mg     1.9     08-10    TPro  6.8  /  Alb  3.7  /  TBili  0.6  /  DBili  x   /  AST  14  /  ALT  11  /  AlkPhos  62  08-10    Creatinine Trend: 0.77<--, 0.70<--, 0.70<--    COAGS:           T(C): 36.9 (08-11-18 @ 05:12), Max: 37.3 (08-10-18 @ 14:45)  HR: 77 (08-11-18 @ 05:12) (77 - 88)  BP: 187/106 (08-11-18 @ 05:12) (142/92 - 187/106)  RR: 16 (08-11-18 @ 05:12) (16 - 16)  SpO2: 97% (08-11-18 @ 05:12) (97% - 100%)  Wt(kg): --    I&O's Summary    10 Aug 2018 07:01  -  11 Aug 2018 07:00  --------------------------------------------------------  IN: 980 mL / OUT: 300 mL / NET: 680 mL    11 Aug 2018 07:01  -  11 Aug 2018 12:33  --------------------------------------------------------  IN: 320 mL / OUT: 0 mL / NET: 320 mL        DIAGNOSTIC DATA  TELEMETRY: NSR no events    RADIOLOGY:   Xray Chest 2 Views PA/Lat (08.09.18 @ 04:49) >  IMPRESSION:     Clear lungs.         Transthoracic Echocardiogram (08.09.18 @ 12:54) >  CONCLUSIONS:  1. Calcified trileaflet aortic valve with normal opening.  2. Normal left atrium.  LA volume index = 14 cc/m2.  3. Increased relative wall thickness with normal left  ventricular mass index, consistent with concentric left  ventricular remodeling.  4. Normal left ventricular systolic function. No segmental  wall motion abnormalities.  5. Normal right ventricular size and function.  6. Normal tricuspid valve. No tricuspid regurgitation.  7. Normal pericardium with trace pericardial effusion.  ------------------------------------------------------------------------  Confirmed on  8/9/2018 - 14:48:03 by HEIDI Verde      < from: Cardiac Cath Lab - Adult (08.09.18 @ 16:26) >  CORONARY VESSELS: The coronary circulation is right dominant.  LM:   --  Distal left main: There was a discrete 20 % stenosis.  LAD:   --  Proximal LAD: There was a tubular 20 % stenosis.  --  Mid LAD: There was a tubular 40 % stenosis.  --  Distal LAD: There was a tubular 70 % stenosis in the proximal third of  the vessel segment. There was REECE grade 3 flow through the vessel (brisk  flow).  --  D1: The vessel was small sized. There was a tubular 80 % stenosis in  the proximal third of the vessel segment. There was REECE grade 3 flow  through the vessel (brisk flow). In a second lesion, there was a discrete  90 % stenosis in the middle third of the vessel segment. There was REECE  grade 3 flow through the vessel (brisk flow).  CX:   --  Mid circumflex: There was a tubular 80 % stenosis. There was REECE  grade 3 flow through the vessel (brisk flow) and a small vascular  territory distal to the lesion.  --  OM1: There was a discrete 80 % stenosis at the ostium of the vessel  segment. There was REECE grade 3 flow through the vessel (brisk flow).  RCA:   --  Proximal RCA: There was a tubular 60 % stenosis. There was REECE  grade 3 flow through the vessel (brisk flow).  --  Distal RCA: There was a discrete 40 % stenosis in the distal third of  the vessel segment.  --  RPDA: There was a 100 % stenosis in the middle third of the vessel  segment. There was REECE grade 0 flow through the vessel (no flow).  --  RPL1: There was a tubular 50 % stenosis in the proximal third of the  vessel segment. There was REECE grade 3 flow through the vessel (brisk  flow).  COMPLICATIONS: No complications occurred during the cath lab visit.  DIAGNOSTIC RECOMMENDATIONS: The patient's anti-anginal regimen should be  further intensified. Patient management should include aggressive medical  therapy.      ASSESSMENT AND PLAN:    57 yo male PMHx HTN, DM, former smoker a/w  Unstable Angina and HTN Emergency:   admitted with hypertensive urgency and unstable angina s/p cath 8/9:     -- TTE with normal LV function with no significant valve disease  -- Cath noted - patient with moderate multivessel disease not amenable to stenting  -- optimize anti anginal meds, Increase COreg to 25 mg PO daily  - Plan to switch meliton imdur in next 24-48 Hrs  -- HTN emergency - BP improving slowly (SBP 150s today)  -- HD stable no evidence of CHF      Jaime Alvarado MD, FACC

## 2018-08-12 VITALS
DIASTOLIC BLOOD PRESSURE: 96 MMHG | TEMPERATURE: 98 F | SYSTOLIC BLOOD PRESSURE: 155 MMHG | HEART RATE: 73 BPM | RESPIRATION RATE: 18 BRPM | OXYGEN SATURATION: 98 %

## 2018-08-12 LAB
BASOPHILS # BLD AUTO: 0.07 K/UL — SIGNIFICANT CHANGE UP (ref 0–0.2)
BASOPHILS NFR BLD AUTO: 0.7 % — SIGNIFICANT CHANGE UP (ref 0–2)
BUN SERPL-MCNC: 21 MG/DL — SIGNIFICANT CHANGE UP (ref 7–23)
CALCIUM SERPL-MCNC: 9.3 MG/DL — SIGNIFICANT CHANGE UP (ref 8.4–10.5)
CHLORIDE SERPL-SCNC: 101 MMOL/L — SIGNIFICANT CHANGE UP (ref 98–107)
CO2 SERPL-SCNC: 22 MMOL/L — SIGNIFICANT CHANGE UP (ref 22–31)
CREAT SERPL-MCNC: 0.85 MG/DL — SIGNIFICANT CHANGE UP (ref 0.5–1.3)
EOSINOPHIL # BLD AUTO: 0.44 K/UL — SIGNIFICANT CHANGE UP (ref 0–0.5)
EOSINOPHIL NFR BLD AUTO: 4.6 % — SIGNIFICANT CHANGE UP (ref 0–6)
GLUCOSE BLDC GLUCOMTR-MCNC: 161 MG/DL — HIGH (ref 70–99)
GLUCOSE BLDC GLUCOMTR-MCNC: 166 MG/DL — HIGH (ref 70–99)
GLUCOSE BLDC GLUCOMTR-MCNC: 99 MG/DL — SIGNIFICANT CHANGE UP (ref 70–99)
GLUCOSE SERPL-MCNC: 154 MG/DL — HIGH (ref 70–99)
HCT VFR BLD CALC: 49.4 % — SIGNIFICANT CHANGE UP (ref 39–50)
HGB BLD-MCNC: 16.5 G/DL — SIGNIFICANT CHANGE UP (ref 13–17)
IMM GRANULOCYTES # BLD AUTO: 0.03 # — SIGNIFICANT CHANGE UP
IMM GRANULOCYTES NFR BLD AUTO: 0.3 % — SIGNIFICANT CHANGE UP (ref 0–1.5)
LYMPHOCYTES # BLD AUTO: 3.14 K/UL — SIGNIFICANT CHANGE UP (ref 1–3.3)
LYMPHOCYTES # BLD AUTO: 33 % — SIGNIFICANT CHANGE UP (ref 13–44)
MAGNESIUM SERPL-MCNC: 1.9 MG/DL — SIGNIFICANT CHANGE UP (ref 1.6–2.6)
MCHC RBC-ENTMCNC: 26.8 PG — LOW (ref 27–34)
MCHC RBC-ENTMCNC: 33.4 % — SIGNIFICANT CHANGE UP (ref 32–36)
MCV RBC AUTO: 80.3 FL — SIGNIFICANT CHANGE UP (ref 80–100)
MONOCYTES # BLD AUTO: 0.69 K/UL — SIGNIFICANT CHANGE UP (ref 0–0.9)
MONOCYTES NFR BLD AUTO: 7.3 % — SIGNIFICANT CHANGE UP (ref 2–14)
NEUTROPHILS # BLD AUTO: 5.14 K/UL — SIGNIFICANT CHANGE UP (ref 1.8–7.4)
NEUTROPHILS NFR BLD AUTO: 54.1 % — SIGNIFICANT CHANGE UP (ref 43–77)
NRBC # FLD: 0 — SIGNIFICANT CHANGE UP
PLATELET # BLD AUTO: 317 K/UL — SIGNIFICANT CHANGE UP (ref 150–400)
PMV BLD: 11.9 FL — SIGNIFICANT CHANGE UP (ref 7–13)
POTASSIUM SERPL-MCNC: 4.2 MMOL/L — SIGNIFICANT CHANGE UP (ref 3.5–5.3)
POTASSIUM SERPL-SCNC: 4.2 MMOL/L — SIGNIFICANT CHANGE UP (ref 3.5–5.3)
RBC # BLD: 6.15 M/UL — HIGH (ref 4.2–5.8)
RBC # FLD: 12.5 % — SIGNIFICANT CHANGE UP (ref 10.3–14.5)
SODIUM SERPL-SCNC: 136 MMOL/L — SIGNIFICANT CHANGE UP (ref 135–145)
WBC # BLD: 9.51 K/UL — SIGNIFICANT CHANGE UP (ref 3.8–10.5)
WBC # FLD AUTO: 9.51 K/UL — SIGNIFICANT CHANGE UP (ref 3.8–10.5)

## 2018-08-12 RX ORDER — CARVEDILOL PHOSPHATE 80 MG/1
1 CAPSULE, EXTENDED RELEASE ORAL
Qty: 60 | Refills: 0
Start: 2018-08-12 | End: 2018-09-10

## 2018-08-12 RX ORDER — ASPIRIN/CALCIUM CARB/MAGNESIUM 324 MG
1 TABLET ORAL
Qty: 0 | Refills: 0 | DISCHARGE
Start: 2018-08-12

## 2018-08-12 RX ORDER — ISOSORBIDE MONONITRATE 60 MG/1
1 TABLET, EXTENDED RELEASE ORAL
Qty: 30 | Refills: 0
Start: 2018-08-12 | End: 2018-09-10

## 2018-08-12 RX ORDER — ASPIRIN/CALCIUM CARB/MAGNESIUM 324 MG
1 TABLET ORAL
Qty: 0 | Refills: 0 | COMMUNITY

## 2018-08-12 RX ORDER — ISOSORBIDE MONONITRATE 60 MG/1
30 TABLET, EXTENDED RELEASE ORAL DAILY
Qty: 0 | Refills: 0 | Status: DISCONTINUED | OUTPATIENT
Start: 2018-08-12 | End: 2018-08-12

## 2018-08-12 RX ORDER — ATORVASTATIN CALCIUM 80 MG/1
1 TABLET, FILM COATED ORAL
Qty: 30 | Refills: 0
Start: 2018-08-12 | End: 2018-09-10

## 2018-08-12 RX ADMIN — Medication 81 MILLIGRAM(S): at 07:38

## 2018-08-12 RX ADMIN — Medication 1: at 13:31

## 2018-08-12 RX ADMIN — CARVEDILOL PHOSPHATE 25 MILLIGRAM(S): 80 CAPSULE, EXTENDED RELEASE ORAL at 05:14

## 2018-08-12 RX ADMIN — CARVEDILOL PHOSPHATE 25 MILLIGRAM(S): 80 CAPSULE, EXTENDED RELEASE ORAL at 17:18

## 2018-08-12 RX ADMIN — Medication 1: at 09:08

## 2018-08-12 RX ADMIN — ISOSORBIDE DINITRATE 5 MILLIGRAM(S): 5 TABLET ORAL at 05:14

## 2018-08-12 RX ADMIN — Medication 200 MILLIGRAM(S): at 07:42

## 2018-08-12 RX ADMIN — POLYETHYLENE GLYCOL 3350 17 GRAM(S): 17 POWDER, FOR SOLUTION ORAL at 07:38

## 2018-08-12 NOTE — PROGRESS NOTE ADULT - SUBJECTIVE AND OBJECTIVE BOX
Patient denies active CP, SOB Review of systems otherwise (-)    acetaminophen   Tablet. 650 milliGRAM(s) Oral every 6 hours PRN  aspirin enteric coated 81 milliGRAM(s) Oral daily  atorvastatin 80 milliGRAM(s) Oral at bedtime  carvedilol 25 milliGRAM(s) Oral every 12 hours  dextrose 5%. 1000 milliLiter(s) IV Continuous <Continuous>  guaiFENesin    Syrup 200 milliGRAM(s) Oral every 6 hours PRN  insulin lispro (HumaLOG) corrective regimen sliding scale   SubCutaneous three times a day before meals  insulin lispro (HumaLOG) corrective regimen sliding scale   SubCutaneous at bedtime  isosorbide   dinitrate Tablet (ISORDIL) 5 milliGRAM(s) Oral three times a day  polyethylene glycol 3350 17 Gram(s) Oral daily                            16.5   9.51  )-----------( 317      ( 12 Aug 2018 05:31 )             49.4       Hemoglobin: 16.5 g/dL (08-12 @ 05:31)  Hemoglobin: 15.5 g/dL (08-11 @ 06:04)  Hemoglobin: 15.9 g/dL (08-10 @ 05:45)  Hemoglobin: 15.3 g/dL (08-09 @ 04:24)      08-12    136  |  101  |  21  ----------------------------<  154<H>  4.2   |  22  |  0.85    Ca    9.3      12 Aug 2018 05:32  Mg     1.9     08-12      Creatinine Trend: 0.85<--, 0.77<--, 0.70<--, 0.70<--    COAGS:           T(C): 36.7 (08-12-18 @ 05:12), Max: 37 (08-11-18 @ 13:42)  HR: 76 (08-12-18 @ 05:12) (76 - 84)  BP: 152/104 (08-12-18 @ 05:12) (138/100 - 152/104)  RR: 16 (08-12-18 @ 05:12) (16 - 16)  SpO2: 98% (08-12-18 @ 05:12) (98% - 100%)  Wt(kg): --    I&O's Summary    11 Aug 2018 07:01  -  12 Aug 2018 07:00  --------------------------------------------------------  IN: 650 mL / OUT: 0 mL / NET: 650 mL    12 Aug 2018 07:01  -  12 Aug 2018 12:06  --------------------------------------------------------  IN: 320 mL / OUT: 0 mL / NET: 320 mL          DIAGNOSTIC DATA  TELEMETRY: NSR no events    RADIOLOGY:   Xray Chest 2 Views PA/Lat (08.09.18 @ 04:49) >  IMPRESSION:     Clear lungs.         Transthoracic Echocardiogram (08.09.18 @ 12:54) >  CONCLUSIONS:  1. Calcified trileaflet aortic valve with normal opening.  2. Normal left atrium.  LA volume index = 14 cc/m2.  3. Increased relative wall thickness with normal left  ventricular mass index, consistent with concentric left  ventricular remodeling.  4. Normal left ventricular systolic function. No segmental  wall motion abnormalities.  5. Normal right ventricular size and function.  6. Normal tricuspid valve. No tricuspid regurgitation.  7. Normal pericardium with trace pericardial effusion.  ------------------------------------------------------------------------  Confirmed on  8/9/2018 - 14:48:03 by HEIDI Verde      < from: Cardiac Cath Lab - Adult (08.09.18 @ 16:26) >  CORONARY VESSELS: The coronary circulation is right dominant.  LM:   --  Distal left main: There was a discrete 20 % stenosis.  LAD:   --  Proximal LAD: There was a tubular 20 % stenosis.  --  Mid LAD: There was a tubular 40 % stenosis.  --  Distal LAD: There was a tubular 70 % stenosis in the proximal third of  the vessel segment. There was REECE grade 3 flow through the vessel (brisk  flow).  --  D1: The vessel was small sized. There was a tubular 80 % stenosis in  the proximal third of the vessel segment. There was REECE grade 3 flow  through the vessel (brisk flow). In a second lesion, there was a discrete  90 % stenosis in the middle third of the vessel segment. There was REECE  grade 3 flow through the vessel (brisk flow).  CX:   --  Mid circumflex: There was a tubular 80 % stenosis. There was REECE  grade 3 flow through the vessel (brisk flow) and a small vascular  territory distal to the lesion.  --  OM1: There was a discrete 80 % stenosis at the ostium of the vessel  segment. There was REECE grade 3 flow through the vessel (brisk flow).  RCA:   --  Proximal RCA: There was a tubular 60 % stenosis. There was REECE  grade 3 flow through the vessel (brisk flow).  --  Distal RCA: There was a discrete 40 % stenosis in the distal third of  the vessel segment.  --  RPDA: There was a 100 % stenosis in the middle third of the vessel  segment. There was REECE grade 0 flow through the vessel (no flow).  --  RPL1: There was a tubular 50 % stenosis in the proximal third of the  vessel segment. There was REECE grade 3 flow through the vessel (brisk  flow).  COMPLICATIONS: No complications occurred during the cath lab visit.  DIAGNOSTIC RECOMMENDATIONS: The patient's anti-anginal regimen should be  further intensified. Patient management should include aggressive medical  therapy.      ASSESSMENT AND PLAN:    57 yo male PMHx HTN, DM, former smoker a/w  Unstable Angina and HTN Emergency:   admitted with hypertensive urgency and unstable angina s/p cath 8/9:     -- TTE with normal LV function with no significant valve disease  -- Cath noted - patient with moderate multivessel disease not amenable to stenting  -- no more angina  -- BP improved  - switch to Imdur 30 mg PO dialy  -- HD stable no evidence of CHF  -- Plan to D/C today and f/u with cardiology clinic at [facility]       Jaime Alvarado MD, [facility]

## 2022-09-09 ENCOUNTER — INPATIENT (INPATIENT)
Facility: HOSPITAL | Age: 62
LOS: 12 days | Discharge: HOME | End: 2022-09-22
Attending: INTERNAL MEDICINE | Admitting: INTERNAL MEDICINE

## 2022-09-09 VITALS
HEART RATE: 73 BPM | OXYGEN SATURATION: 97 % | RESPIRATION RATE: 18 BRPM | TEMPERATURE: 98 F | SYSTOLIC BLOOD PRESSURE: 138 MMHG | DIASTOLIC BLOOD PRESSURE: 67 MMHG | HEIGHT: 63 IN

## 2022-09-09 DIAGNOSIS — I10 ESSENTIAL (PRIMARY) HYPERTENSION: ICD-10-CM

## 2022-09-09 DIAGNOSIS — E78.5 HYPERLIPIDEMIA, UNSPECIFIED: ICD-10-CM

## 2022-09-09 DIAGNOSIS — R42 DIZZINESS AND GIDDINESS: ICD-10-CM

## 2022-09-09 DIAGNOSIS — Z29.9 ENCOUNTER FOR PROPHYLACTIC MEASURES, UNSPECIFIED: ICD-10-CM

## 2022-09-09 PROBLEM — E78.00 PURE HYPERCHOLESTEROLEMIA, UNSPECIFIED: Chronic | Status: ACTIVE | Noted: 2018-08-09

## 2022-09-09 LAB
A1C WITH ESTIMATED AVERAGE GLUCOSE RESULT: 6.2 % — HIGH (ref 4–5.6)
ALBUMIN SERPL ELPH-MCNC: 3.8 G/DL — SIGNIFICANT CHANGE UP (ref 3.3–5)
ALP SERPL-CCNC: 73 U/L — SIGNIFICANT CHANGE UP (ref 40–120)
ALT FLD-CCNC: 13 U/L — SIGNIFICANT CHANGE UP (ref 4–41)
ANION GAP SERPL CALC-SCNC: 7 MMOL/L — SIGNIFICANT CHANGE UP (ref 7–14)
AST SERPL-CCNC: 15 U/L — SIGNIFICANT CHANGE UP (ref 4–40)
BASOPHILS # BLD AUTO: 0.04 K/UL — SIGNIFICANT CHANGE UP (ref 0–0.2)
BASOPHILS NFR BLD AUTO: 0.6 % — SIGNIFICANT CHANGE UP (ref 0–2)
BILIRUB SERPL-MCNC: 0.4 MG/DL — SIGNIFICANT CHANGE UP (ref 0.2–1.2)
BUN SERPL-MCNC: 14 MG/DL — SIGNIFICANT CHANGE UP (ref 7–23)
CALCIUM SERPL-MCNC: 8.8 MG/DL — SIGNIFICANT CHANGE UP (ref 8.4–10.5)
CHLORIDE SERPL-SCNC: 100 MMOL/L — SIGNIFICANT CHANGE UP (ref 98–107)
CK MB BLD-MCNC: 5.2 % — HIGH (ref 0–2.5)
CK MB CFR SERPL CALC: 2.8 NG/ML — SIGNIFICANT CHANGE UP
CK SERPL-CCNC: 54 U/L — SIGNIFICANT CHANGE UP (ref 30–200)
CO2 SERPL-SCNC: 23 MMOL/L — SIGNIFICANT CHANGE UP (ref 22–31)
CREAT SERPL-MCNC: 0.76 MG/DL — SIGNIFICANT CHANGE UP (ref 0.5–1.3)
EGFR: 102 ML/MIN/1.73M2 — SIGNIFICANT CHANGE UP
EOSINOPHIL # BLD AUTO: 0.24 K/UL — SIGNIFICANT CHANGE UP (ref 0–0.5)
EOSINOPHIL NFR BLD AUTO: 3.6 % — SIGNIFICANT CHANGE UP (ref 0–6)
ESTIMATED AVERAGE GLUCOSE: 131 — SIGNIFICANT CHANGE UP
GLUCOSE SERPL-MCNC: 234 MG/DL — HIGH (ref 70–99)
HCT VFR BLD CALC: 41.3 % — SIGNIFICANT CHANGE UP (ref 39–50)
HGB BLD-MCNC: 13.6 G/DL — SIGNIFICANT CHANGE UP (ref 13–17)
IANC: 3.8 K/UL — SIGNIFICANT CHANGE UP (ref 1.8–7.4)
IMM GRANULOCYTES NFR BLD AUTO: 0.5 % — SIGNIFICANT CHANGE UP (ref 0–1.5)
LYMPHOCYTES # BLD AUTO: 1.82 K/UL — SIGNIFICANT CHANGE UP (ref 1–3.3)
LYMPHOCYTES # BLD AUTO: 27.6 % — SIGNIFICANT CHANGE UP (ref 13–44)
MAGNESIUM SERPL-MCNC: 1.8 MG/DL — SIGNIFICANT CHANGE UP (ref 1.6–2.6)
MCHC RBC-ENTMCNC: 26.8 PG — LOW (ref 27–34)
MCHC RBC-ENTMCNC: 32.9 GM/DL — SIGNIFICANT CHANGE UP (ref 32–36)
MCV RBC AUTO: 81.3 FL — SIGNIFICANT CHANGE UP (ref 80–100)
MONOCYTES # BLD AUTO: 0.66 K/UL — SIGNIFICANT CHANGE UP (ref 0–0.9)
MONOCYTES NFR BLD AUTO: 10 % — SIGNIFICANT CHANGE UP (ref 2–14)
NEUTROPHILS # BLD AUTO: 3.8 K/UL — SIGNIFICANT CHANGE UP (ref 1.8–7.4)
NEUTROPHILS NFR BLD AUTO: 57.7 % — SIGNIFICANT CHANGE UP (ref 43–77)
NRBC # BLD: 0 /100 WBCS — SIGNIFICANT CHANGE UP (ref 0–0)
NRBC # FLD: 0 K/UL — SIGNIFICANT CHANGE UP (ref 0–0)
NT-PROBNP SERPL-SCNC: 402 PG/ML — HIGH
PHOSPHATE SERPL-MCNC: 2.7 MG/DL — SIGNIFICANT CHANGE UP (ref 2.5–4.5)
PLATELET # BLD AUTO: 236 K/UL — SIGNIFICANT CHANGE UP (ref 150–400)
POTASSIUM SERPL-MCNC: 4.3 MMOL/L — SIGNIFICANT CHANGE UP (ref 3.5–5.3)
POTASSIUM SERPL-SCNC: 4.3 MMOL/L — SIGNIFICANT CHANGE UP (ref 3.5–5.3)
PROT SERPL-MCNC: 6.7 G/DL — SIGNIFICANT CHANGE UP (ref 6–8.3)
RBC # BLD: 5.08 M/UL — SIGNIFICANT CHANGE UP (ref 4.2–5.8)
RBC # FLD: 13.4 % — SIGNIFICANT CHANGE UP (ref 10.3–14.5)
SARS-COV-2 RNA SPEC QL NAA+PROBE: SIGNIFICANT CHANGE UP
SODIUM SERPL-SCNC: 130 MMOL/L — LOW (ref 135–145)
TROPONIN T, HIGH SENSITIVITY RESULT: 12 NG/L — SIGNIFICANT CHANGE UP
TSH SERPL-MCNC: 1.65 UIU/ML — SIGNIFICANT CHANGE UP (ref 0.27–4.2)
WBC # BLD: 6.59 K/UL — SIGNIFICANT CHANGE UP (ref 3.8–10.5)
WBC # FLD AUTO: 6.59 K/UL — SIGNIFICANT CHANGE UP (ref 3.8–10.5)

## 2022-09-09 PROCEDURE — 99223 1ST HOSP IP/OBS HIGH 75: CPT | Mod: GC

## 2022-09-09 PROCEDURE — 99285 EMERGENCY DEPT VISIT HI MDM: CPT | Mod: 25

## 2022-09-09 PROCEDURE — 70450 CT HEAD/BRAIN W/O DYE: CPT | Mod: 26,MA

## 2022-09-09 PROCEDURE — 93010 ELECTROCARDIOGRAM REPORT: CPT

## 2022-09-09 RX ORDER — SODIUM CHLORIDE 9 MG/ML
1000 INJECTION INTRAMUSCULAR; INTRAVENOUS; SUBCUTANEOUS ONCE
Refills: 0 | Status: COMPLETED | OUTPATIENT
Start: 2022-09-09 | End: 2022-09-09

## 2022-09-09 RX ORDER — MECLIZINE HCL 12.5 MG
50 TABLET ORAL ONCE
Refills: 0 | Status: COMPLETED | OUTPATIENT
Start: 2022-09-09 | End: 2022-09-09

## 2022-09-09 RX ORDER — DIAZEPAM 5 MG
5 TABLET ORAL ONCE
Refills: 0 | Status: DISCONTINUED | OUTPATIENT
Start: 2022-09-09 | End: 2022-09-09

## 2022-09-09 RX ADMIN — Medication 5 MILLIGRAM(S): at 17:24

## 2022-09-09 RX ADMIN — Medication 50 MILLIGRAM(S): at 12:00

## 2022-09-09 RX ADMIN — SODIUM CHLORIDE 1000 MILLILITER(S): 9 INJECTION INTRAMUSCULAR; INTRAVENOUS; SUBCUTANEOUS at 12:00

## 2022-09-09 NOTE — H&P ADULT - NSHPPHYSICALEXAM_GEN_ALL_CORE
Vital Signs Last 24 Hrs  T(C): 37 (09 Sep 2022 20:06), Max: 37 (09 Sep 2022 20:06)  T(F): 98.6 (09 Sep 2022 20:06), Max: 98.6 (09 Sep 2022 20:06)  HR: 60 (09 Sep 2022 20:06) (60 - 73)  BP: 182/85 (09 Sep 2022 20:06) (138/67 - 182/85)  BP(mean): --  RR: 15 (09 Sep 2022 20:06) (14 - 18)  SpO2: 100% (09 Sep 2022 20:06) (97% - 100%)    Parameters below as of 09 Sep 2022 20:06  Patient On (Oxygen Delivery Method): room air        CONSTITUTIONAL: Well-groomed, in no apparent distress  EYES: No conjunctival or scleral injection, non-icteric;   ENMT: No external nasal lesions; MMM  NECK: Trachea midline without palpable neck mass; thyroid not enlarged and non-tender  RESPIRATORY: Breathing comfortably; no dullness to percussion; lungs CTA without wheeze/rhonchi/rales  CARDIOVASCULAR: +S1S2, RRR, no M/G/R; pedal pulses full and symmetric; no lower extremity edema  GASTROINTESTINAL: No palpable masses or tenderness, +BS throughout, no rebound/guarding; no hepatosplenomegaly; no hernia palpated  LYMPHATIC: No cervical LAD or tenderness  SKIN: No rashes or ulcers noted  NEUROLOGIC: CN II-XII intact; sensation intact in LEs b/l to light touch  PSYCHIATRIC: A+O x 3; mood and affect appropriate; appropriate insight and judgment Vital Signs Last 24 Hrs  T(C): 37 (09 Sep 2022 20:06), Max: 37 (09 Sep 2022 20:06)  T(F): 98.6 (09 Sep 2022 20:06), Max: 98.6 (09 Sep 2022 20:06)  HR: 60 (09 Sep 2022 20:06) (60 - 73)  BP: 182/85 (09 Sep 2022 20:06) (138/67 - 182/85)  BP(mean): --  RR: 15 (09 Sep 2022 20:06) (14 - 18)  SpO2: 100% (09 Sep 2022 20:06) (97% - 100%)    Parameters below as of 09 Sep 2022 20:06  Patient On (Oxygen Delivery Method): room air    CONSTITUTIONAL: Well-groomed, in no apparent distress  EYES: No conjunctival or scleral injection   ENMT: No external nasal lesions; MMM  NECK: Trachea midline without palpable neck mass; thyroid not enlarged and non-tender  RESPIRATORY: Breathing comfortably; no dullness to percussion; lungs CTA without wheeze/rhonchi/rales  CARDIOVASCULAR: +S1S2, RRR, no M/G/R; pedal pulses full and symmetric; no lower extremity edema  GASTROINTESTINAL: No palpable masses or tenderness, +BS throughout, no rebound/guarding  LYMPHATIC: No cervical LAD or tenderness  SKIN: vitiligo noted on upper extremities, abdomen, lower extremities   NEUROLOGIC: CN VII asymmetry, asymmetric smile, R side; sensation intact in LEs b/l to light touch  PSYCHIATRIC: A+O x 3; mood and affect appropriate; appropriate insight and judgment Vital Signs Last 24 Hrs  T(C): 37 (09 Sep 2022 20:06), Max: 37 (09 Sep 2022 20:06)  T(F): 98.6 (09 Sep 2022 20:06), Max: 98.6 (09 Sep 2022 20:06)  HR: 60 (09 Sep 2022 20:06) (60 - 73)  BP: 182/85 (09 Sep 2022 20:06) (138/67 - 182/85)  BP(mean): --  RR: 15 (09 Sep 2022 20:06) (14 - 18)  SpO2: 100% (09 Sep 2022 20:06) (97% - 100%)    Parameters below as of 09 Sep 2022 20:06  Patient On (Oxygen Delivery Method): room air    CONSTITUTIONAL: Well-groomed, in no apparent distress, well-developed  EYES: No conjunctival or scleral injection   ENMT: No external nasal lesions; MMM  NECK: Trachea midline without palpable neck mass; thyroid not enlarged and non-tender  RESPIRATORY: Breathing comfortably; no dullness to percussion; lungs CTA without wheeze/rhonchi/rales, no tachypnea  CARDIOVASCULAR: +S1S2, RRR, no M/G/R; pedal pulses full and symmetric; no lower extremity edema  GASTROINTESTINAL: No palpable masses or tenderness, +BS throughout, no rebound/guarding  LYMPHATIC: No cervical LAD or tenderness  SKIN: vitiligo noted on upper extremities, abdomen, lower extremities   NEUROLOGIC: R lower facial droop, asymmetric smile, R side; Rest of CN intact, no nystagmus on exam, mildly decreased sensation on R arm and leg compared to left (chronic)  Gait exam: Slow unsteady gait, favors L side and leans this way  Romberg negative  FTN normal  Strength 5/5 in all extremities  PSYCHIATRIC: A+O x 3; mood and affect appropriate; appropriate insight and judgment Vital Signs Last 24 Hrs  T(C): 37 (09 Sep 2022 20:06), Max: 37 (09 Sep 2022 20:06)  T(F): 98.6 (09 Sep 2022 20:06), Max: 98.6 (09 Sep 2022 20:06)  HR: 60 (09 Sep 2022 20:06) (60 - 73)  BP: 182/85 (09 Sep 2022 20:06) (138/67 - 182/85)  BP(mean): --  RR: 15 (09 Sep 2022 20:06) (14 - 18)  SpO2: 100% (09 Sep 2022 20:06) (97% - 100%)    Parameters below as of 09 Sep 2022 20:06  Patient On (Oxygen Delivery Method): room air    CONSTITUTIONAL: Well-groomed, in no apparent distress, well-developed  EYES: No conjunctival or scleral injection   ENMT: No external nasal lesions; MMM  NECK: Trachea midline without palpable neck mass; thyroid not enlarged and non-tender  RESPIRATORY: Breathing comfortably; no dullness to percussion; lungs CTA without wheeze/rhonchi/rales, no tachypnea  CARDIOVASCULAR: +S1S2, RRR, no M/G/R; pedal pulses full and symmetric; no lower extremity edema  GASTROINTESTINAL: No palpable masses or tenderness, +BS throughout, no rebound/guarding  LYMPHATIC: No cervical LAD or tenderness  SKIN: vitiligo noted on upper extremities, abdomen, lower extremities   NEUROLOGIC: R lower facial droop, asymmetric smile, R side; Rest of CN intact, no nystagmus on exam, mildly decreased sensation on R arm and leg compared to left (chronic)  Gait exam: Slow unsteady gait, favors R side and leans this way  Romberg negative  FTN normal  Strength 5/5 in all extremities  PSYCHIATRIC: A+O x 3; mood and affect appropriate; appropriate insight and judgment

## 2022-09-09 NOTE — ED PROVIDER NOTE - CLINICAL SUMMARY MEDICAL DECISION MAKING FREE TEXT BOX
63yo M PMH nonsmoker, DM on metformin, HTN presents with dizziness x1 wk. Nonfocal neurological examination. Head movt does not exacerbate the dizziness on exam currently. 2 beats of nystagmus to the L. Plan to obtain labs, give meds, obtain orthostatics, ecg, reassess.

## 2022-09-09 NOTE — ED PROVIDER NOTE - PHYSICAL EXAMINATION
G: mid age male in NAD, cooperative with exam   H: NCAT  E: EOMI   M: Mucous membranes moist   R: CTABL, nWOB  C: RRR  A: Soft, NT/ND, no rebound/guarding   MSK: no calf tenderness, no LE edema  Neuro: CN2-12 grossly intact, A&Ox4, MS +5/5 in UE and LE BL, finger to nose smooth and rapid, gross sensation intact in UE and LE BL, gait smooth and coordinated

## 2022-09-09 NOTE — ED ADULT NURSE NOTE - IN THE PAST 12 MONTHS HAVE YOU USED DRUGS OTHER THAN THOSE REQUIRED FOR MEDICAL REASON?
Writer spoke with patient at this time.  Patient is aware that surgical date has to be > 28 days from a positive COVID test.  Writer offered patient a new surgical date of 12/01, patient is agreeable.  Patient is aware that his previously scheduled COVID test will be canceled and won't be necessary prior to surgery on 12/01, patient verbalized understanding.  Writer to send patient a new letter with updated surgery date and updated post-op appt time to the following address per patient request:    [address] [place] [id number]    Patient denies any questions and/or concerns at this time.   No

## 2022-09-09 NOTE — ED ADULT NURSE NOTE - OBJECTIVE STATEMENT
A&Ox4. ambulatory. c/o intermittent dizziness since returning from [place]. p[t states he get dizziness when he turns his head from side to side. NAD. pt denies SOB, chest pain, weakness, urinary symptoms, HA, n/v/d, fevers, chills. respirations are even and un labored. ABD is soft, non tender, non distended with normal active bowel sounds.  skin intact. awaiting orders. EKG obtained. placed on cardiac monitor, NSR. call bell at bedside.

## 2022-09-09 NOTE — H&P ADULT - PROBLEM SELECTOR PLAN 5
Diet: DASH/TLC  DVT: lovenox 40  Dispo: home -NSR with T wave inversions in lateral leads  -similar to previous EKG  -Hx of CAD w/out stent placement  -troponin 38--> 12, w/out chest pain  -monitor on tele  -f/u echo w/ bubble  -possible cardiology consult in the AM NSR with T wave inversions in lateral leads similar to previous EKG  -Hx of CAD w/out stent placement  -troponin 38--> 12, w/out chest pain. Not likely ACS  -monitor on tele  -f/u echo w/ bubble  -possible cardiology consult in the AM depending on echo

## 2022-09-09 NOTE — H&P ADULT - NSHPSOCIALHISTORY_GEN_ALL_CORE
Patient is currently living at home with wife and son and daughter.  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

## 2022-09-09 NOTE — ED PROVIDER NOTE - ATTENDING CONTRIBUTION TO CARE
63 yo M hx DM2, HTN, pw c/o dizziness x 1 week. Dizziness has been intermittent, worse with movement. No associated visual changes, nv. No HA. No focal weakness/numbness/tingling. Neuro intact. Plan for labs, CT, meds, reassess

## 2022-09-09 NOTE — H&P ADULT - PROBLEM SELECTOR PLAN 2
-home meds: carvedilol and isosorbide dinitrate -bp 152/78  -c/w home meds: losartan, tamsulosin, metoprolol -bp elevated 152/78  -c/w home meds: losartan, tamsulosin, metoprolol

## 2022-09-09 NOTE — ED ADULT NURSE REASSESSMENT NOTE - NS ED NURSE REASSESS COMMENT FT1
Break covering RN: patient received at 1205 from ABRAM Garcia. patient at baseline mental status, appears to be resting comfortably in bed. Breathing even and unlabored, pallor/diaphoresis not noted. IV site clean dry and intact. no acute distress noted.
pt states he feels better then when he first arrived to the hospital but still doesn't feel right. MD Carter made aware. NAD. pt denies SOB, chest pain, dizziness, weakness, urinary symptoms, HA, n/v/d, fevers, chills. respirations are even and un labored. skin intact. safety precautions maintained.
report received from ABRAM Garcia, pt A&ox4 ambulatory admitted to tele for dizziness. endorses significant relief after meds. NSR on tele. comfort and safety measures provided.

## 2022-09-09 NOTE — H&P ADULT - ATTENDING COMMENTS
62M with PMHx of HTN, HLD, DM on metformin, CAD w/out stents, BPH, remote CVA? who comes in with complaints of dizziness for a week.    #Dizziness  Acute dizziness for 1 week. Not worsened with head positioning. Possible cerebellar dysfunction? given abnl cerebellum on CTH vs stroke vs peripheral etiology. Gait is slow and favors left side. FTN nl. R facial droop and R sided numbness is chronic for 15 years per patient with hx of "slight stroke" in [employer]  -neuro recs appreciated  -MRI brain with and without contrast with IAC  -MRI cervical spine  -tele  -echo with bubble  -PT/OT eval  -statin, ASA  -meclizine PRN    #Abnormal EKG  Lateral TWI similar to prior. Positive delta troponin  -tele  -echo  -possible cards c/s depending on echo findings  -no active CP    #HTN  Resume losartan, metoprolol    #HLD  Statin    #DM  A1C, ISS    #BPH  Flomax, oxybutynin    #DVT ppx  Lovenox 62M with PMHx of HTN, HLD, DM on metformin, CAD w/out stents, BPH, remote CVA? who comes in with complaints of dizziness for a week.    #Dizziness  Acute dizziness for 1 week. Not worsened with head positioning. Possible cerebellar dysfunction? given abnl cerebellum on CTH vs stroke vs peripheral etiology. Gait is slow and favors right side. FTN nl. R facial droop and R sided numbness is chronic for 15 years per patient with hx of "slight stroke" in [facility]  -neuro recs appreciated  -MRI brain with and without contrast with IAC  -MRI cervical spine  -tele  -echo with bubble  -PT/OT eval  -statin, ASA  -meclizine PRN    #Abnormal EKG  Lateral TWI similar to prior. Positive delta troponin  -tele  -echo  -possible cards c/s depending on echo findings  -no active CP    #HTN  Resume losartan, metoprolol    #HLD  Statin    #DM  A1C, ISS    #BPH  Flomax, oxybutynin    #DVT ppx  Lovenox

## 2022-09-09 NOTE — ED PROVIDER NOTE - NS ED MD DISPO ADMITTING SERVICE
Quality 226: Preventive Care And Screening: Tobacco Use: Screening And Cessation Intervention: Patient screened for tobacco use and is an ex/non-smoker
Detail Level: Detailed
Quality 402: Tobacco Use And Help With Quitting Among Adolescents: Patient screened for tobacco and is an ex-smoker
MED

## 2022-09-09 NOTE — H&P ADULT - ASSESSMENT
Patient is a 61 yo M with pmhx of HTN, HLD, DM on metformin who comes in with complaints of dizziness.  Patient is a 61 yo M with pmhx of HTN, HLD, DM on metformin, CAD, BPH who comes in with complaints of dizziness for 1 week.  Patient is a 61 yo M with pmhx of HTN, HLD, DM on metformin, CAD, BPH, remote hx CVA who comes in with complaints of dizziness for 1 week.

## 2022-09-09 NOTE — ED ADULT TRIAGE NOTE - CHIEF COMPLAINT QUOTE
Pt c/o dizziness X 1 week. 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 Describes "room as spinning". Denies N/V, fevers/chills, numbness/tingling. Neuro/sensory intact. Phx: HTN, DM. .

## 2022-09-09 NOTE — ED PROVIDER NOTE - NSICDXPASTMEDICALHX_GEN_ALL_CORE_FT
PAST MEDICAL HISTORY:  Hypercholesteremia     Hypertension, unspecified type     Other specified diabetes mellitus with complication, without long-term current use of insulin

## 2022-09-09 NOTE — H&P ADULT - NSICDXFAMILYHX_GEN_ALL_CORE_FT
FAMILY HISTORY:  Family history of early CAD     FAMILY HISTORY:  Family history of early CAD    Father  Still living? Unknown  FH: diabetes mellitus, Age at diagnosis: Age Unknown  FH: hypertension, Age at diagnosis: Age Unknown    Mother  Still living? Unknown  FH: diabetes mellitus, Age at diagnosis: Age Unknown  FH: hypertension, Age at diagnosis: Age Unknown

## 2022-09-09 NOTE — H&P ADULT - NSICDXPASTMEDICALHX_GEN_ALL_CORE_FT
PAST MEDICAL HISTORY:  Hypercholesteremia     Hypertension, unspecified type     Other specified diabetes mellitus with complication, without long-term current use of insulin      PAST MEDICAL HISTORY:  Diabetes mellitus     Hypercholesteremia     Hypertension, unspecified type      PAST MEDICAL HISTORY:  CAD (coronary artery disease)     Diabetes mellitus     Hypercholesteremia     Hypertension, unspecified type

## 2022-09-09 NOTE — H&P ADULT - NSHPLABSRESULTS_GEN_ALL_CORE
LABS:                          13.6   6.59  )-----------( 236      ( 09 Sep 2022 12:00 )             41.3     09-09    130<L>  |  100  |  14  ----------------------------<  234<H>  4.3   |  23  |  0.76    Ca    8.8      09 Sep 2022 12:00  Phos  2.7     09-09  Mg     1.80     09-09    TPro  6.7  /  Alb  3.8  /  TBili  0.4  /  DBili  x   /  AST  15  /  ALT  13  /  AlkPhos  73  09-09    LIVER FUNCTIONS - ( 09 Sep 2022 12:00 )  Alb: 3.8 g/dL / Pro: 6.7 g/dL / ALK PHOS: 73 U/L / ALT: 13 U/L / AST: 15 U/L / GGT: x    EKG:    CT head non contrast:   No acute intracranial hemorrhage, mass effect, or shift of the midline structures.    Slight disproportionate volume loss of the superior cerebellar vermis and  superior cerebellar hemispheres bilaterally of unclear clinical significance. LABS:                          13.6   6.59  )-----------( 236      ( 09 Sep 2022 12:00 )             41.3     09-09    130<L>  |  100  |  14  ----------------------------<  234<H>  4.3   |  23  |  0.76    Ca    8.8      09 Sep 2022 12:00  Phos  2.7     09-09  Mg     1.80     09-09    TPro  6.7  /  Alb  3.8  /  TBili  0.4  /  DBili  x   /  AST  15  /  ALT  13  /  AlkPhos  73  09-09    LIVER FUNCTIONS - ( 09 Sep 2022 12:00 )  Alb: 3.8 g/dL / Pro: 6.7 g/dL / ALK PHOS: 73 U/L / ALT: 13 U/L / AST: 15 U/L / GGT: x    EKG:  normal sinus rhythm, HR 97  septal infarct, age undetermined  lateral lead T wave inversions.    CT head non contrast:   No acute intracranial hemorrhage, mass effect, or shift of the midline structures.  Slight disproportionate volume loss of the superior cerebellar vermis and  superior cerebellar hemispheres bilaterally of unclear clinical significance. LABS:                          13.6   6.59  )-----------( 236      ( 09 Sep 2022 12:00 )             41.3     09-09    130<L>  |  100  |  14  ----------------------------<  234<H>  4.3   |  23  |  0.76    Ca    8.8      09 Sep 2022 12:00  Phos  2.7     09-09  Mg     1.80     09-09    TPro  6.7  /  Alb  3.8  /  TBili  0.4  /  DBili  x   /  AST  15  /  ALT  13  /  AlkPhos  73  09-09    LIVER FUNCTIONS - ( 09 Sep 2022 12:00 )  Alb: 3.8 g/dL / Pro: 6.7 g/dL / ALK PHOS: 73 U/L / ALT: 13 U/L / AST: 15 U/L / GGT: x    EKG:  normal sinus rhythm, HR 97  septal infarct, age undetermined  lateral lead T wave inversions.  unchanged from prior EKG    CT head non contrast:   No acute intracranial hemorrhage, mass effect, or shift of the midline structures.  Slight disproportionate volume loss of the superior cerebellar vermis and  superior cerebellar hemispheres bilaterally of unclear clinical significance. LABS reviewed by me                          13.6   6.59  )-----------( 236      ( 09 Sep 2022 12:00 )             41.3     09-09    130<L>  |  100  |  14  ----------------------------<  234<H>  4.3   |  23  |  0.76    Ca    8.8      09 Sep 2022 12:00  Phos  2.7     09-09  Mg     1.80     09-09    TPro  6.7  /  Alb  3.8  /  TBili  0.4  /  DBili  x   /  AST  15  /  ALT  13  /  AlkPhos  73  09-09    LIVER FUNCTIONS - ( 09 Sep 2022 12:00 )  Alb: 3.8 g/dL / Pro: 6.7 g/dL / ALK PHOS: 73 U/L / ALT: 13 U/L / AST: 15 U/L / GGT: x    EKG personally reviewed and interpreted: normal sinus rhythm, HR 97, TWI V4-V6 old unchanged from prior EKG 8/9/2018    Imaging reviewed:  CT head non contrast:   No acute intracranial hemorrhage, mass effect, or shift of the midline structures.  Slight disproportionate volume loss of the superior cerebellar vermis and  superior cerebellar hemispheres bilaterally of unclear clinical significance.

## 2022-09-09 NOTE — ED PROVIDER NOTE - OBJECTIVE STATEMENT
61yo M PMH nonsmoker, DM on metformin, HTN. 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 Since then he had dizziness. Sitting down alleviates the symptoms. Change in head position worsens symptoms. Worsens all of a sudden. Has to hold onto things to ensure he does not fall - feels better shortly after. Episodes last approx 2 minutes. Denies hematochezia, melena. States he has had a normal PO intake. No dysuria or hematuria. In one 24 hour period, he gets this episode 3 times. NKDA. Does not feel dizzy currently. States when he stands up to walk, he feels it come on. States  head movement precipitates the dizziness "slightly."

## 2022-09-09 NOTE — H&P ADULT - PROBLEM SELECTOR PLAN 1
-EKG  -orthostatics negative  -Neurology recs: MRI brain/IAC w/wo gado, MRI cervical spine, Consider CDU Pt reports 1 week hx of dizziness.  -EKG NSR, HR 97  -orthostatics negative: standing 172/75, sitting/lyin/77  -CTH noncontrast:  No acute intracranial hemorrhage, mass effect, or shift of the midline structures.  -Neurology recs: MRI brain/IAC w/wo gado, MRI cervical spine Pt reports 1 week hx of dizziness, provoked with ambulation and standing but not quite worsened with head movement. Workup for  Central vs Peripheral etiology   -EKG NSR, HR 97, T wave inversions in lateral leads, unchanged from prior EKG  -orthostatics negative: standing 172/75, sitting/lyin/77  -CTH noncontrast showing slight disproportionate volume loss of the superior cerebellar vermis and superior cerebellar hemispheres bilaterally of unclear clinical significance  -Neurology recs: MRI brain/IAC w/wo gado, MRI cervical spine noncontrast  -meclizine 25 mg   -PT/OT eval Pt reports 1 week hx of dizziness, provoked with ambulation and standing but not quite worsened with head movement. Workup for Central vs Peripheral etiology   DDx: stroke, vestibular dysfunction, plus possible cervical myelopathy.  -EKG NSR, HR 97, T wave inversions in lateral leads, unchanged from prior EKG  -orthostatics negative: standing 172/75, sitting/lyin/77  -CTH noncontrast showing slight disproportionate volume loss of the superior cerebellar vermis and superior cerebellar hemispheres bilaterally of unclear clinical significance  -Neurology recs: MRI brain/IAC w/wo gado, MRI cervical spine noncontrast  -meclizine 25 mg PRN  -PT/OT eval

## 2022-09-09 NOTE — H&P ADULT - NSHPREVIEWOFSYSTEMS_GEN_ALL_CORE
REVIEW OF SYSTEMS:    CONSTITUTIONAL: No weakness, fevers or chills  EYES/ENT: No visual changes;  No vertigo or throat pain   NECK: No pain or stiffness  RESPIRATORY: No cough, wheezing, hemoptysis; No shortness of breath  CARDIOVASCULAR: No chest pain or palpitations  GASTROINTESTINAL: No abdominal or epigastric pain. No nausea, vomiting, or hematemesis; No diarrhea or constipation. No melena or hematochezia.  GENITOURINARY: No dysuria, frequency or hematuria  NEUROLOGICAL: No numbness or weakness  SKIN: No itching, rashes REVIEW OF SYSTEMS:    CONSTITUTIONAL: + weakness, +dizziness, fevers or chills  EYES/ENT: No visual changes  NECK: No pain or stiffness  RESPIRATORY: No cough, wheezing, hemoptysis; No shortness of breath  CARDIOVASCULAR: No chest pain or palpitations  GASTROINTESTINAL: No abdominal or epigastric pain. No nausea, vomiting, or hematemesis; No diarrhea or constipation. No melena or hematochezia.  GENITOURINARY: No dysuria, frequency or hematuria  NEUROLOGICAL: No numbness  SKIN: No itching, rashes REVIEW OF SYSTEMS:    CONSTITUTIONAL: + weakness, +dizziness, fevers or chills  EYES/ENT: No visual changes, no eye pain  NECK: No pain or stiffness  RESPIRATORY: No cough, wheezing, hemoptysis; No shortness of breath  CARDIOVASCULAR: No chest pain or palpitations  GASTROINTESTINAL: No abdominal or epigastric pain. No nausea, vomiting, or hematemesis; No diarrhea or constipation. No melena or hematochezia.  GENITOURINARY: No dysuria, frequency or hematuria  NEUROLOGICAL: +chronic R sided numbness, +chronic R facial droop, +dizziness, denies syncope  HEME: No bleeding issues, no LAD  SKIN: No itching, rashes

## 2022-09-09 NOTE — H&P ADULT - HISTORY OF PRESENT ILLNESS
Patient is a 61 yo M with pmhx of HTN, HLD, DM on metformin who comes in with complaints of dizziness.  Patient is a 61 yo M with pmhx of HTN, HLD, DM on metformin who comes in with complaints of dizziness. 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 He reports that he felt weak and diaphoretic and felt as though he was losing balance. Patient held on to furniture while he experienced symptoms for 2 mins. Patient reports since then, he has repeated bouts of dizziness and weakness (about 3x a day) that are triggered by positional changes that last about ~2 mins in duration. He denies loss of consciousness, vision changes, headaches, nausea/ vomiting. However, pt feels he experiences prodrome prior to dizzy spells and is able brace himself so he does not lose balance. Patient reports that he has maintained a good appetite and has adequate oral intake. Patient also reports R. sided tinnitus that has been ongoing for 3 years. Patient is a 61 yo M with pmhx of HTN, HLD, DM on metformin who comes in with complaints of dizziness for a week. 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 He reports that he felt weak and diaphoretic and felt as though he was losing balance. Patient held on to furniture while he experienced symptoms for 2 mins. Patient reports since then, he has repeated bouts of dizziness and weakness (about 3x a day) that are triggered by positional changes that last about ~2 mins in duration. He denies loss of consciousness, vision changes, headaches, nausea/ vomiting. However, pt feels he experiences prodrome prior to dizzy spells and is able brace himself so he does not lose balance. Patient reports that he has maintained a good appetite and has adequate oral intake. Patient also reports R. sided tinnitus that has been ongoing for 3 years. Patient is a 61 yo M with pmhx of HTN, HLD, DM on metformin, CAD w/out stents, BPH who comes in with complaints of dizziness for a week. 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 He reports that he felt weak and diaphoretic and felt as though he was losing balance. Patient held on to furniture while he experienced symptoms for 2 mins. Patient reports since then, he has repeated bouts of dizziness and weakness (about 3x a day) that are triggered by positional changes that last about ~2 mins in duration. He denies loss of consciousness, vision changes, headaches, nausea/ vomiting. However, pt feels he experiences prodrome prior to dizzy spells and is able brace himself so he does not lose balance. Patient reports that he has maintained a good appetite and has adequate oral intake. Patient also reports R. sided tinnitus that has been ongoing for 3 years. 62M with PMHx of HTN, HLD, DM on metformin, CAD w/out stents, BPH, remote CVA? who comes in with complaints of dizziness for a week. 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 He reports that he felt weak and diaphoretic and felt as though he was losing balance. Patient held on to furniture while he experienced symptoms for 2 mins. Patient reports since then, he has repeated bouts of dizziness and weakness (about 3x a day) that are triggered by positional changes that last about ~2 mins in duration. He denies loss of consciousness, vision changes, headaches, nausea/ vomiting. However, pt feels he experiences prodrome prior to dizzy spells and is able brace himself so he does not lose balance. Patient reports that he has maintained a good appetite and has adequate oral intake. Patient also reports R. sided tinnitus that has been ongoing for 3 years. Of note patient endorses a "slight stroke" 15 years ago while in [facility] with residual R lower facial droop and R arm/leg numbness. This has been unchanged from baseline. Dizziness is however new for him.

## 2022-09-09 NOTE — ED PROVIDER NOTE - NS ED ROS FT
Gen: No F/C/NS  Head: No falls/head trauma  Eyes: No changes in vision   Resp: No cough or trouble breathing  Cardiovascular: No chest pain or palpitation  Gastroenteric: No N/V/D  :  No change in urine output, dysuria or hematuria   MS: No joint or muscle pain  Neuro: No headache; no abnormal movements; +dizziness   Skin: No new rash

## 2022-09-10 DIAGNOSIS — E11.9 TYPE 2 DIABETES MELLITUS WITHOUT COMPLICATIONS: ICD-10-CM

## 2022-09-10 DIAGNOSIS — N40.0 BENIGN PROSTATIC HYPERPLASIA WITHOUT LOWER URINARY TRACT SYMPTOMS: ICD-10-CM

## 2022-09-10 DIAGNOSIS — I25.10 ATHEROSCLEROTIC HEART DISEASE OF NATIVE CORONARY ARTERY WITHOUT ANGINA PECTORIS: ICD-10-CM

## 2022-09-10 DIAGNOSIS — R94.31 ABNORMAL ELECTROCARDIOGRAM [ECG] [EKG]: ICD-10-CM

## 2022-09-10 LAB
ALBUMIN SERPL ELPH-MCNC: 3.8 G/DL — SIGNIFICANT CHANGE UP (ref 3.3–5)
ALP SERPL-CCNC: 61 U/L — SIGNIFICANT CHANGE UP (ref 40–120)
ALT FLD-CCNC: 14 U/L — SIGNIFICANT CHANGE UP (ref 4–41)
ANION GAP SERPL CALC-SCNC: 11 MMOL/L — SIGNIFICANT CHANGE UP (ref 7–14)
AST SERPL-CCNC: 15 U/L — SIGNIFICANT CHANGE UP (ref 4–40)
BILIRUB SERPL-MCNC: 0.5 MG/DL — SIGNIFICANT CHANGE UP (ref 0.2–1.2)
BUN SERPL-MCNC: 11 MG/DL — SIGNIFICANT CHANGE UP (ref 7–23)
CALCIUM SERPL-MCNC: 9.1 MG/DL — SIGNIFICANT CHANGE UP (ref 8.4–10.5)
CHLORIDE SERPL-SCNC: 104 MMOL/L — SIGNIFICANT CHANGE UP (ref 98–107)
CHOLEST SERPL-MCNC: 112 MG/DL — SIGNIFICANT CHANGE UP
CO2 SERPL-SCNC: 25 MMOL/L — SIGNIFICANT CHANGE UP (ref 22–31)
CREAT SERPL-MCNC: 0.81 MG/DL — SIGNIFICANT CHANGE UP (ref 0.5–1.3)
EGFR: 100 ML/MIN/1.73M2 — SIGNIFICANT CHANGE UP
GLUCOSE BLDC GLUCOMTR-MCNC: 106 MG/DL — HIGH (ref 70–99)
GLUCOSE BLDC GLUCOMTR-MCNC: 147 MG/DL — HIGH (ref 70–99)
GLUCOSE BLDC GLUCOMTR-MCNC: 224 MG/DL — HIGH (ref 70–99)
GLUCOSE BLDC GLUCOMTR-MCNC: 77 MG/DL — SIGNIFICANT CHANGE UP (ref 70–99)
GLUCOSE SERPL-MCNC: 76 MG/DL — SIGNIFICANT CHANGE UP (ref 70–99)
HCT VFR BLD CALC: 45.1 % — SIGNIFICANT CHANGE UP (ref 39–50)
HDLC SERPL-MCNC: 41 MG/DL — SIGNIFICANT CHANGE UP
HGB BLD-MCNC: 14.1 G/DL — SIGNIFICANT CHANGE UP (ref 13–17)
LIPID PNL WITH DIRECT LDL SERPL: 60 MG/DL — SIGNIFICANT CHANGE UP
MCHC RBC-ENTMCNC: 26 PG — LOW (ref 27–34)
MCHC RBC-ENTMCNC: 31.3 GM/DL — LOW (ref 32–36)
MCV RBC AUTO: 83.1 FL — SIGNIFICANT CHANGE UP (ref 80–100)
NON HDL CHOLESTEROL: 71 MG/DL — SIGNIFICANT CHANGE UP
NRBC # BLD: 0 /100 WBCS — SIGNIFICANT CHANGE UP (ref 0–0)
NRBC # FLD: 0 K/UL — SIGNIFICANT CHANGE UP (ref 0–0)
PLATELET # BLD AUTO: 230 K/UL — SIGNIFICANT CHANGE UP (ref 150–400)
POTASSIUM SERPL-MCNC: 4.2 MMOL/L — SIGNIFICANT CHANGE UP (ref 3.5–5.3)
POTASSIUM SERPL-SCNC: 4.2 MMOL/L — SIGNIFICANT CHANGE UP (ref 3.5–5.3)
PROT SERPL-MCNC: 6.6 G/DL — SIGNIFICANT CHANGE UP (ref 6–8.3)
RBC # BLD: 5.43 M/UL — SIGNIFICANT CHANGE UP (ref 4.2–5.8)
RBC # FLD: 13.6 % — SIGNIFICANT CHANGE UP (ref 10.3–14.5)
SODIUM SERPL-SCNC: 140 MMOL/L — SIGNIFICANT CHANGE UP (ref 135–145)
TRIGL SERPL-MCNC: 53 MG/DL — SIGNIFICANT CHANGE UP
WBC # BLD: 6.86 K/UL — SIGNIFICANT CHANGE UP (ref 3.8–10.5)
WBC # FLD AUTO: 6.86 K/UL — SIGNIFICANT CHANGE UP (ref 3.8–10.5)

## 2022-09-10 PROCEDURE — 99233 SBSQ HOSP IP/OBS HIGH 50: CPT

## 2022-09-10 RX ORDER — DEXTROSE 50 % IN WATER 50 %
15 SYRINGE (ML) INTRAVENOUS ONCE
Refills: 0 | Status: DISCONTINUED | OUTPATIENT
Start: 2022-09-10 | End: 2022-09-22

## 2022-09-10 RX ORDER — DEXTROSE 50 % IN WATER 50 %
25 SYRINGE (ML) INTRAVENOUS ONCE
Refills: 0 | Status: DISCONTINUED | OUTPATIENT
Start: 2022-09-10 | End: 2022-09-22

## 2022-09-10 RX ORDER — ATORVASTATIN CALCIUM 80 MG/1
20 TABLET, FILM COATED ORAL AT BEDTIME
Refills: 0 | Status: DISCONTINUED | OUTPATIENT
Start: 2022-09-10 | End: 2022-09-22

## 2022-09-10 RX ORDER — INSULIN LISPRO 100/ML
VIAL (ML) SUBCUTANEOUS
Refills: 0 | Status: DISCONTINUED | OUTPATIENT
Start: 2022-09-10 | End: 2022-09-22

## 2022-09-10 RX ORDER — ASPIRIN/CALCIUM CARB/MAGNESIUM 324 MG
81 TABLET ORAL DAILY
Refills: 0 | Status: DISCONTINUED | OUTPATIENT
Start: 2022-09-10 | End: 2022-09-22

## 2022-09-10 RX ORDER — OXYBUTYNIN CHLORIDE 5 MG
10 TABLET ORAL EVERY 24 HOURS
Refills: 0 | Status: DISCONTINUED | OUTPATIENT
Start: 2022-09-10 | End: 2022-09-11

## 2022-09-10 RX ORDER — TAMSULOSIN HYDROCHLORIDE 0.4 MG/1
0.8 CAPSULE ORAL AT BEDTIME
Refills: 0 | Status: DISCONTINUED | OUTPATIENT
Start: 2022-09-10 | End: 2022-09-22

## 2022-09-10 RX ORDER — ACETAMINOPHEN 500 MG
650 TABLET ORAL EVERY 6 HOURS
Refills: 0 | Status: DISCONTINUED | OUTPATIENT
Start: 2022-09-10 | End: 2022-09-22

## 2022-09-10 RX ORDER — METFORMIN HYDROCHLORIDE 850 MG/1
1 TABLET ORAL
Qty: 0 | Refills: 0 | DISCHARGE

## 2022-09-10 RX ORDER — OXYBUTYNIN CHLORIDE 5 MG
1 TABLET ORAL
Qty: 0 | Refills: 0 | DISCHARGE

## 2022-09-10 RX ORDER — MECLIZINE HCL 12.5 MG
25 TABLET ORAL EVERY 12 HOURS
Refills: 0 | Status: DISCONTINUED | OUTPATIENT
Start: 2022-09-10 | End: 2022-09-22

## 2022-09-10 RX ORDER — LANOLIN ALCOHOL/MO/W.PET/CERES
3 CREAM (GRAM) TOPICAL AT BEDTIME
Refills: 0 | Status: DISCONTINUED | OUTPATIENT
Start: 2022-09-10 | End: 2022-09-22

## 2022-09-10 RX ORDER — DEXTROSE 50 % IN WATER 50 %
12.5 SYRINGE (ML) INTRAVENOUS ONCE
Refills: 0 | Status: DISCONTINUED | OUTPATIENT
Start: 2022-09-10 | End: 2022-09-22

## 2022-09-10 RX ORDER — GLUCAGON INJECTION, SOLUTION 0.5 MG/.1ML
1 INJECTION, SOLUTION SUBCUTANEOUS ONCE
Refills: 0 | Status: DISCONTINUED | OUTPATIENT
Start: 2022-09-10 | End: 2022-09-22

## 2022-09-10 RX ORDER — INFLUENZA VIRUS VACCINE 15; 15; 15; 15 UG/.5ML; UG/.5ML; UG/.5ML; UG/.5ML
0.5 SUSPENSION INTRAMUSCULAR ONCE
Refills: 0 | Status: DISCONTINUED | OUTPATIENT
Start: 2022-09-10 | End: 2022-09-22

## 2022-09-10 RX ORDER — SODIUM CHLORIDE 9 MG/ML
1000 INJECTION, SOLUTION INTRAVENOUS
Refills: 0 | Status: DISCONTINUED | OUTPATIENT
Start: 2022-09-10 | End: 2022-09-22

## 2022-09-10 RX ORDER — METOPROLOL TARTRATE 50 MG
25 TABLET ORAL DAILY
Refills: 0 | Status: DISCONTINUED | OUTPATIENT
Start: 2022-09-10 | End: 2022-09-11

## 2022-09-10 RX ORDER — INSULIN LISPRO 100/ML
VIAL (ML) SUBCUTANEOUS AT BEDTIME
Refills: 0 | Status: DISCONTINUED | OUTPATIENT
Start: 2022-09-10 | End: 2022-09-22

## 2022-09-10 RX ORDER — MECLIZINE HCL 12.5 MG
25 TABLET ORAL EVERY 12 HOURS
Refills: 0 | Status: DISCONTINUED | OUTPATIENT
Start: 2022-09-10 | End: 2022-09-10

## 2022-09-10 RX ORDER — LOSARTAN POTASSIUM 100 MG/1
50 TABLET, FILM COATED ORAL DAILY
Refills: 0 | Status: DISCONTINUED | OUTPATIENT
Start: 2022-09-10 | End: 2022-09-22

## 2022-09-10 RX ADMIN — Medication 2: at 13:19

## 2022-09-10 RX ADMIN — Medication 10 MILLIGRAM(S): at 11:31

## 2022-09-10 RX ADMIN — ATORVASTATIN CALCIUM 20 MILLIGRAM(S): 80 TABLET, FILM COATED ORAL at 22:17

## 2022-09-10 RX ADMIN — Medication 25 MILLIGRAM(S): at 06:32

## 2022-09-10 RX ADMIN — TAMSULOSIN HYDROCHLORIDE 0.8 MILLIGRAM(S): 0.4 CAPSULE ORAL at 22:17

## 2022-09-10 RX ADMIN — LOSARTAN POTASSIUM 50 MILLIGRAM(S): 100 TABLET, FILM COATED ORAL at 06:32

## 2022-09-10 RX ADMIN — Medication 81 MILLIGRAM(S): at 11:31

## 2022-09-10 NOTE — PHYSICAL THERAPY INITIAL EVALUATION ADULT - ADDITIONAL COMMENTS
Pt. was left in stretcher post PT Evaluation, no apparent distress, all lines intact. RN made aware.

## 2022-09-10 NOTE — PROGRESS NOTE ADULT - PROBLEM SELECTOR PLAN 1
Pt reports 1 week hx of dizziness, provoked with ambulation and standing but not quite worsened with head movement. Workup for Central vs Peripheral etiology   DDx: stroke, vestibular dysfunction, plus possible cervical myelopathy.  -EKG NSR, HR 97, T wave inversions in lateral leads, unchanged from prior EKG  -orthostatics negative: standing 172/75, sitting/lyin/77  -CTH noncontrast showing slight disproportionate volume loss of the superior cerebellar vermis and superior cerebellar hemispheres bilaterally of unclear clinical significance  -Neurology recs: MRI brain/IAC w/wo gado, MRI cervical spine noncontrast  -meclizine 25 mg PRN  -PT/OT eval

## 2022-09-10 NOTE — PATIENT PROFILE ADULT - FALL HARM RISK - UNIVERSAL INTERVENTIONS
Bed in lowest position, wheels locked, appropriate side rails in place/Call bell, personal items and telephone in reach/Instruct patient to call for assistance before getting out of bed or chair/Non-slip footwear when patient is out of bed/[place] to call system/Physically safe environment - no spills, clutter or unnecessary equipment/Purposeful Proactive Rounding/Room/bathroom lighting operational, light cord in reach

## 2022-09-10 NOTE — PHYSICAL THERAPY INITIAL EVALUATION ADULT - RANGE OF MOTION EXAMINATION, REHAB EVAL
Occupational Therapy   Volleyball Group    Maximo Gautam   MRN: [id number]     ANTHONY Reyna   1/20/2017       bilateral upper extremity ROM was WFL (within functional limits)/bilateral lower extremity ROM was WFL (within functional limits)

## 2022-09-10 NOTE — PHYSICAL THERAPY INITIAL EVALUATION ADULT - PERTINENT HX OF CURRENT PROBLEM, REHAB EVAL
Pt. admitted for dizziness. Per radiology report, CT head revealed no acute intracranial hemorrhage, mass effect, or shift of the midline structures.

## 2022-09-10 NOTE — PHYSICAL THERAPY INITIAL EVALUATION ADULT - GENERAL OBSERVATIONS, REHAB EVAL
Consult received, chart reviewed. Patient received in stretcher, no apparent distress, +tele. Pt. reports feeling of heaviness in the head.

## 2022-09-11 DIAGNOSIS — R00.1 BRADYCARDIA, UNSPECIFIED: ICD-10-CM

## 2022-09-11 LAB
ALBUMIN SERPL ELPH-MCNC: 3.6 G/DL — SIGNIFICANT CHANGE UP (ref 3.3–5)
ALP SERPL-CCNC: 63 U/L — SIGNIFICANT CHANGE UP (ref 40–120)
ALT FLD-CCNC: 12 U/L — SIGNIFICANT CHANGE UP (ref 4–41)
ANION GAP SERPL CALC-SCNC: 12 MMOL/L — SIGNIFICANT CHANGE UP (ref 7–14)
AST SERPL-CCNC: 14 U/L — SIGNIFICANT CHANGE UP (ref 4–40)
BILIRUB SERPL-MCNC: 0.5 MG/DL — SIGNIFICANT CHANGE UP (ref 0.2–1.2)
BUN SERPL-MCNC: 16 MG/DL — SIGNIFICANT CHANGE UP (ref 7–23)
CALCIUM SERPL-MCNC: 8.9 MG/DL — SIGNIFICANT CHANGE UP (ref 8.4–10.5)
CHLORIDE SERPL-SCNC: 102 MMOL/L — SIGNIFICANT CHANGE UP (ref 98–107)
CO2 SERPL-SCNC: 23 MMOL/L — SIGNIFICANT CHANGE UP (ref 22–31)
CREAT SERPL-MCNC: 0.82 MG/DL — SIGNIFICANT CHANGE UP (ref 0.5–1.3)
EGFR: 99 ML/MIN/1.73M2 — SIGNIFICANT CHANGE UP
GLUCOSE BLDC GLUCOMTR-MCNC: 114 MG/DL — HIGH (ref 70–99)
GLUCOSE BLDC GLUCOMTR-MCNC: 121 MG/DL — HIGH (ref 70–99)
GLUCOSE BLDC GLUCOMTR-MCNC: 172 MG/DL — HIGH (ref 70–99)
GLUCOSE BLDC GLUCOMTR-MCNC: 74 MG/DL — SIGNIFICANT CHANGE UP (ref 70–99)
GLUCOSE SERPL-MCNC: 117 MG/DL — HIGH (ref 70–99)
HCT VFR BLD CALC: 45.7 % — SIGNIFICANT CHANGE UP (ref 39–50)
HGB BLD-MCNC: 14.5 G/DL — SIGNIFICANT CHANGE UP (ref 13–17)
MAGNESIUM SERPL-MCNC: 1.9 MG/DL — SIGNIFICANT CHANGE UP (ref 1.6–2.6)
MCHC RBC-ENTMCNC: 26.1 PG — LOW (ref 27–34)
MCHC RBC-ENTMCNC: 31.7 GM/DL — LOW (ref 32–36)
MCV RBC AUTO: 82.3 FL — SIGNIFICANT CHANGE UP (ref 80–100)
NRBC # BLD: 0 /100 WBCS — SIGNIFICANT CHANGE UP (ref 0–0)
NRBC # FLD: 0 K/UL — SIGNIFICANT CHANGE UP (ref 0–0)
PHOSPHATE SERPL-MCNC: 3.2 MG/DL — SIGNIFICANT CHANGE UP (ref 2.5–4.5)
PLATELET # BLD AUTO: 247 K/UL — SIGNIFICANT CHANGE UP (ref 150–400)
POTASSIUM SERPL-MCNC: 4 MMOL/L — SIGNIFICANT CHANGE UP (ref 3.5–5.3)
POTASSIUM SERPL-SCNC: 4 MMOL/L — SIGNIFICANT CHANGE UP (ref 3.5–5.3)
PROT SERPL-MCNC: 6.5 G/DL — SIGNIFICANT CHANGE UP (ref 6–8.3)
RBC # BLD: 5.55 M/UL — SIGNIFICANT CHANGE UP (ref 4.2–5.8)
RBC # FLD: 13.2 % — SIGNIFICANT CHANGE UP (ref 10.3–14.5)
SODIUM SERPL-SCNC: 137 MMOL/L — SIGNIFICANT CHANGE UP (ref 135–145)
WBC # BLD: 7.15 K/UL — SIGNIFICANT CHANGE UP (ref 3.8–10.5)
WBC # FLD AUTO: 7.15 K/UL — SIGNIFICANT CHANGE UP (ref 3.8–10.5)

## 2022-09-11 PROCEDURE — 99233 SBSQ HOSP IP/OBS HIGH 50: CPT

## 2022-09-11 PROCEDURE — 93010 ELECTROCARDIOGRAM REPORT: CPT

## 2022-09-11 RX ORDER — OXYBUTYNIN CHLORIDE 5 MG
10 TABLET ORAL EVERY 24 HOURS
Refills: 0 | Status: DISCONTINUED | OUTPATIENT
Start: 2022-09-11 | End: 2022-09-22

## 2022-09-11 RX ADMIN — Medication 1: at 18:03

## 2022-09-11 RX ADMIN — TAMSULOSIN HYDROCHLORIDE 0.8 MILLIGRAM(S): 0.4 CAPSULE ORAL at 22:48

## 2022-09-11 RX ADMIN — Medication 25 MILLIGRAM(S): at 22:49

## 2022-09-11 RX ADMIN — Medication 81 MILLIGRAM(S): at 12:22

## 2022-09-11 RX ADMIN — Medication 10 MILLIGRAM(S): at 12:22

## 2022-09-11 RX ADMIN — LOSARTAN POTASSIUM 50 MILLIGRAM(S): 100 TABLET, FILM COATED ORAL at 05:35

## 2022-09-11 RX ADMIN — ATORVASTATIN CALCIUM 20 MILLIGRAM(S): 80 TABLET, FILM COATED ORAL at 22:48

## 2022-09-11 RX ADMIN — Medication 25 MILLIGRAM(S): at 05:35

## 2022-09-11 NOTE — CHART NOTE - NSCHARTNOTEFT_GEN_A_CORE
Notified by RN, Patient HR 40s-50s on monitor, sinus bradycardia; other vitals stable as per chart; pt asymptomatic lying in bed. Assessed patient at the bedside and seen in NAD, resting comfortably. Patient denying any complaints. Discussed with medicine attending Dr. Rivers. Will hold metoprolol for now and obtain EKG. Will continue to monitor closely.

## 2022-09-11 NOTE — OCCUPATIONAL THERAPY INITIAL EVALUATION ADULT - PERTINENT HX OF CURRENT PROBLEM, REHAB EVAL
Pt. is a 61 y/o male admitted for dizziness. Per radiology report, CT head revealed no acute intracranial hemorrhage, mass effect, or shift of the midline structures.

## 2022-09-12 LAB
ALBUMIN SERPL ELPH-MCNC: 3.9 G/DL — SIGNIFICANT CHANGE UP (ref 3.3–5)
ALP SERPL-CCNC: 72 U/L — SIGNIFICANT CHANGE UP (ref 40–120)
ALT FLD-CCNC: 14 U/L — SIGNIFICANT CHANGE UP (ref 4–41)
ANION GAP SERPL CALC-SCNC: 11 MMOL/L — SIGNIFICANT CHANGE UP (ref 7–14)
AST SERPL-CCNC: 14 U/L — SIGNIFICANT CHANGE UP (ref 4–40)
BILIRUB SERPL-MCNC: 0.4 MG/DL — SIGNIFICANT CHANGE UP (ref 0.2–1.2)
BUN SERPL-MCNC: 20 MG/DL — SIGNIFICANT CHANGE UP (ref 7–23)
CALCIUM SERPL-MCNC: 9.2 MG/DL — SIGNIFICANT CHANGE UP (ref 8.4–10.5)
CHLORIDE SERPL-SCNC: 102 MMOL/L — SIGNIFICANT CHANGE UP (ref 98–107)
CO2 SERPL-SCNC: 23 MMOL/L — SIGNIFICANT CHANGE UP (ref 22–31)
CREAT SERPL-MCNC: 0.84 MG/DL — SIGNIFICANT CHANGE UP (ref 0.5–1.3)
EGFR: 99 ML/MIN/1.73M2 — SIGNIFICANT CHANGE UP
GLUCOSE BLDC GLUCOMTR-MCNC: 132 MG/DL — HIGH (ref 70–99)
GLUCOSE BLDC GLUCOMTR-MCNC: 180 MG/DL — HIGH (ref 70–99)
GLUCOSE BLDC GLUCOMTR-MCNC: 209 MG/DL — HIGH (ref 70–99)
GLUCOSE BLDC GLUCOMTR-MCNC: 95 MG/DL — SIGNIFICANT CHANGE UP (ref 70–99)
GLUCOSE SERPL-MCNC: 123 MG/DL — HIGH (ref 70–99)
HCT VFR BLD CALC: 44.9 % — SIGNIFICANT CHANGE UP (ref 39–50)
HGB BLD-MCNC: 15.1 G/DL — SIGNIFICANT CHANGE UP (ref 13–17)
MAGNESIUM SERPL-MCNC: 1.7 MG/DL — SIGNIFICANT CHANGE UP (ref 1.6–2.6)
MCHC RBC-ENTMCNC: 27.1 PG — SIGNIFICANT CHANGE UP (ref 27–34)
MCHC RBC-ENTMCNC: 33.6 GM/DL — SIGNIFICANT CHANGE UP (ref 32–36)
MCV RBC AUTO: 80.5 FL — SIGNIFICANT CHANGE UP (ref 80–100)
NRBC # BLD: 0 /100 WBCS — SIGNIFICANT CHANGE UP (ref 0–0)
NRBC # FLD: 0 K/UL — SIGNIFICANT CHANGE UP (ref 0–0)
PHOSPHATE SERPL-MCNC: 3.6 MG/DL — SIGNIFICANT CHANGE UP (ref 2.5–4.5)
PLATELET # BLD AUTO: 226 K/UL — SIGNIFICANT CHANGE UP (ref 150–400)
POTASSIUM SERPL-MCNC: 4.2 MMOL/L — SIGNIFICANT CHANGE UP (ref 3.5–5.3)
POTASSIUM SERPL-SCNC: 4.2 MMOL/L — SIGNIFICANT CHANGE UP (ref 3.5–5.3)
PROT SERPL-MCNC: 6.9 G/DL — SIGNIFICANT CHANGE UP (ref 6–8.3)
RBC # BLD: 5.58 M/UL — SIGNIFICANT CHANGE UP (ref 4.2–5.8)
RBC # FLD: 13.2 % — SIGNIFICANT CHANGE UP (ref 10.3–14.5)
SODIUM SERPL-SCNC: 136 MMOL/L — SIGNIFICANT CHANGE UP (ref 135–145)
WBC # BLD: 7.42 K/UL — SIGNIFICANT CHANGE UP (ref 3.8–10.5)
WBC # FLD AUTO: 7.42 K/UL — SIGNIFICANT CHANGE UP (ref 3.8–10.5)

## 2022-09-12 PROCEDURE — 99233 SBSQ HOSP IP/OBS HIGH 50: CPT

## 2022-09-12 PROCEDURE — 93306 TTE W/DOPPLER COMPLETE: CPT | Mod: 26

## 2022-09-12 RX ADMIN — Medication 81 MILLIGRAM(S): at 11:45

## 2022-09-12 RX ADMIN — TAMSULOSIN HYDROCHLORIDE 0.8 MILLIGRAM(S): 0.4 CAPSULE ORAL at 21:15

## 2022-09-12 RX ADMIN — Medication 25 MILLIGRAM(S): at 21:15

## 2022-09-12 RX ADMIN — ATORVASTATIN CALCIUM 20 MILLIGRAM(S): 80 TABLET, FILM COATED ORAL at 21:15

## 2022-09-12 RX ADMIN — Medication 10 MILLIGRAM(S): at 11:45

## 2022-09-12 RX ADMIN — LOSARTAN POTASSIUM 50 MILLIGRAM(S): 100 TABLET, FILM COATED ORAL at 06:58

## 2022-09-12 RX ADMIN — Medication 2: at 13:12

## 2022-09-13 LAB
ANION GAP SERPL CALC-SCNC: 8 MMOL/L — SIGNIFICANT CHANGE UP (ref 7–14)
BUN SERPL-MCNC: 15 MG/DL — SIGNIFICANT CHANGE UP (ref 7–23)
CALCIUM SERPL-MCNC: 9 MG/DL — SIGNIFICANT CHANGE UP (ref 8.4–10.5)
CHLORIDE SERPL-SCNC: 104 MMOL/L — SIGNIFICANT CHANGE UP (ref 98–107)
CO2 SERPL-SCNC: 26 MMOL/L — SIGNIFICANT CHANGE UP (ref 22–31)
CREAT SERPL-MCNC: 0.79 MG/DL — SIGNIFICANT CHANGE UP (ref 0.5–1.3)
EGFR: 100 ML/MIN/1.73M2 — SIGNIFICANT CHANGE UP
GLUCOSE BLDC GLUCOMTR-MCNC: 122 MG/DL — HIGH (ref 70–99)
GLUCOSE BLDC GLUCOMTR-MCNC: 125 MG/DL — HIGH (ref 70–99)
GLUCOSE BLDC GLUCOMTR-MCNC: 160 MG/DL — HIGH (ref 70–99)
GLUCOSE BLDC GLUCOMTR-MCNC: 205 MG/DL — HIGH (ref 70–99)
GLUCOSE SERPL-MCNC: 133 MG/DL — HIGH (ref 70–99)
HCT VFR BLD CALC: 43.2 % — SIGNIFICANT CHANGE UP (ref 39–50)
HGB BLD-MCNC: 14.4 G/DL — SIGNIFICANT CHANGE UP (ref 13–17)
MCHC RBC-ENTMCNC: 26.7 PG — LOW (ref 27–34)
MCHC RBC-ENTMCNC: 33.3 GM/DL — SIGNIFICANT CHANGE UP (ref 32–36)
MCV RBC AUTO: 80.1 FL — SIGNIFICANT CHANGE UP (ref 80–100)
NRBC # BLD: 0 /100 WBCS — SIGNIFICANT CHANGE UP (ref 0–0)
NRBC # FLD: 0 K/UL — SIGNIFICANT CHANGE UP (ref 0–0)
PLATELET # BLD AUTO: 220 K/UL — SIGNIFICANT CHANGE UP (ref 150–400)
POTASSIUM SERPL-MCNC: 4.3 MMOL/L — SIGNIFICANT CHANGE UP (ref 3.5–5.3)
POTASSIUM SERPL-SCNC: 4.3 MMOL/L — SIGNIFICANT CHANGE UP (ref 3.5–5.3)
RBC # BLD: 5.39 M/UL — SIGNIFICANT CHANGE UP (ref 4.2–5.8)
RBC # FLD: 13.4 % — SIGNIFICANT CHANGE UP (ref 10.3–14.5)
SODIUM SERPL-SCNC: 138 MMOL/L — SIGNIFICANT CHANGE UP (ref 135–145)
WBC # BLD: 6.8 K/UL — SIGNIFICANT CHANGE UP (ref 3.8–10.5)
WBC # FLD AUTO: 6.8 K/UL — SIGNIFICANT CHANGE UP (ref 3.8–10.5)

## 2022-09-13 PROCEDURE — 99233 SBSQ HOSP IP/OBS HIGH 50: CPT

## 2022-09-13 RX ADMIN — ATORVASTATIN CALCIUM 20 MILLIGRAM(S): 80 TABLET, FILM COATED ORAL at 21:26

## 2022-09-13 RX ADMIN — Medication 10 MILLIGRAM(S): at 11:28

## 2022-09-13 RX ADMIN — TAMSULOSIN HYDROCHLORIDE 0.8 MILLIGRAM(S): 0.4 CAPSULE ORAL at 21:27

## 2022-09-13 RX ADMIN — Medication 1: at 13:15

## 2022-09-13 RX ADMIN — Medication 25 MILLIGRAM(S): at 23:03

## 2022-09-13 RX ADMIN — LOSARTAN POTASSIUM 50 MILLIGRAM(S): 100 TABLET, FILM COATED ORAL at 06:39

## 2022-09-13 RX ADMIN — Medication 81 MILLIGRAM(S): at 11:29

## 2022-09-13 RX ADMIN — Medication 25 MILLIGRAM(S): at 11:30

## 2022-09-13 NOTE — CHART NOTE - NSCHARTNOTEFT_GEN_A_CORE
Per medical attending, no need for cardiology consult at this time and no need to repeat TTE w/ bubble until MRI scan is done to r/o CVA. Called to expedite MRI scans today

## 2022-09-14 ENCOUNTER — TRANSCRIPTION ENCOUNTER (OUTPATIENT)
Age: 62
End: 2022-09-14

## 2022-09-14 LAB
ANION GAP SERPL CALC-SCNC: 10 MMOL/L — SIGNIFICANT CHANGE UP (ref 7–14)
BUN SERPL-MCNC: 13 MG/DL — SIGNIFICANT CHANGE UP (ref 7–23)
CALCIUM SERPL-MCNC: 8.9 MG/DL — SIGNIFICANT CHANGE UP (ref 8.4–10.5)
CHLORIDE SERPL-SCNC: 106 MMOL/L — SIGNIFICANT CHANGE UP (ref 98–107)
CO2 SERPL-SCNC: 24 MMOL/L — SIGNIFICANT CHANGE UP (ref 22–31)
CREAT SERPL-MCNC: 0.75 MG/DL — SIGNIFICANT CHANGE UP (ref 0.5–1.3)
EGFR: 102 ML/MIN/1.73M2 — SIGNIFICANT CHANGE UP
GLUCOSE BLDC GLUCOMTR-MCNC: 111 MG/DL — HIGH (ref 70–99)
GLUCOSE BLDC GLUCOMTR-MCNC: 138 MG/DL — HIGH (ref 70–99)
GLUCOSE BLDC GLUCOMTR-MCNC: 149 MG/DL — HIGH (ref 70–99)
GLUCOSE BLDC GLUCOMTR-MCNC: 151 MG/DL — HIGH (ref 70–99)
GLUCOSE SERPL-MCNC: 107 MG/DL — HIGH (ref 70–99)
HCT VFR BLD CALC: 43.1 % — SIGNIFICANT CHANGE UP (ref 39–50)
HGB BLD-MCNC: 14 G/DL — SIGNIFICANT CHANGE UP (ref 13–17)
MAGNESIUM SERPL-MCNC: 1.8 MG/DL — SIGNIFICANT CHANGE UP (ref 1.6–2.6)
MCHC RBC-ENTMCNC: 26.5 PG — LOW (ref 27–34)
MCHC RBC-ENTMCNC: 32.5 GM/DL — SIGNIFICANT CHANGE UP (ref 32–36)
MCV RBC AUTO: 81.6 FL — SIGNIFICANT CHANGE UP (ref 80–100)
NRBC # BLD: 0 /100 WBCS — SIGNIFICANT CHANGE UP (ref 0–0)
NRBC # FLD: 0 K/UL — SIGNIFICANT CHANGE UP (ref 0–0)
PHOSPHATE SERPL-MCNC: 3.3 MG/DL — SIGNIFICANT CHANGE UP (ref 2.5–4.5)
PLATELET # BLD AUTO: 227 K/UL — SIGNIFICANT CHANGE UP (ref 150–400)
POTASSIUM SERPL-MCNC: 4 MMOL/L — SIGNIFICANT CHANGE UP (ref 3.5–5.3)
POTASSIUM SERPL-SCNC: 4 MMOL/L — SIGNIFICANT CHANGE UP (ref 3.5–5.3)
RBC # BLD: 5.28 M/UL — SIGNIFICANT CHANGE UP (ref 4.2–5.8)
RBC # FLD: 13.3 % — SIGNIFICANT CHANGE UP (ref 10.3–14.5)
SODIUM SERPL-SCNC: 140 MMOL/L — SIGNIFICANT CHANGE UP (ref 135–145)
WBC # BLD: 5.45 K/UL — SIGNIFICANT CHANGE UP (ref 3.8–10.5)
WBC # FLD AUTO: 5.45 K/UL — SIGNIFICANT CHANGE UP (ref 3.8–10.5)

## 2022-09-14 PROCEDURE — 99233 SBSQ HOSP IP/OBS HIGH 50: CPT

## 2022-09-14 PROCEDURE — 70553 MRI BRAIN STEM W/O & W/DYE: CPT | Mod: 26,76

## 2022-09-14 PROCEDURE — 72156 MRI NECK SPINE W/O & W/DYE: CPT | Mod: 26

## 2022-09-14 RX ORDER — CLOPIDOGREL BISULFATE 75 MG/1
75 TABLET, FILM COATED ORAL DAILY
Refills: 0 | Status: DISCONTINUED | OUTPATIENT
Start: 2022-09-14 | End: 2022-09-22

## 2022-09-14 RX ADMIN — Medication 10 MILLIGRAM(S): at 11:27

## 2022-09-14 RX ADMIN — TAMSULOSIN HYDROCHLORIDE 0.8 MILLIGRAM(S): 0.4 CAPSULE ORAL at 21:54

## 2022-09-14 RX ADMIN — Medication 81 MILLIGRAM(S): at 11:27

## 2022-09-14 RX ADMIN — LOSARTAN POTASSIUM 50 MILLIGRAM(S): 100 TABLET, FILM COATED ORAL at 06:51

## 2022-09-14 RX ADMIN — CLOPIDOGREL BISULFATE 75 MILLIGRAM(S): 75 TABLET, FILM COATED ORAL at 17:35

## 2022-09-14 RX ADMIN — ATORVASTATIN CALCIUM 20 MILLIGRAM(S): 80 TABLET, FILM COATED ORAL at 21:54

## 2022-09-14 RX ADMIN — Medication 25 MILLIGRAM(S): at 17:38

## 2022-09-14 NOTE — DISCHARGE NOTE PROVIDER - HOSPITAL COURSE
Patient is a 63 yo M with pmhx of HTN, HLD, DM on metformin, CAD, BPH, remote hx CVA who comes in with complaints of dizziness for 1 week.     Dizziness.   -Pt reports 1 week hx of dizziness, provoked with ambulation and standing but not quite worsened with head movement. Workup for Central vs Peripheral etiology   -EKG NSR, HR 97, T wave inversions in lateral leads, unchanged from prior EKG  -orthostatics negative: standing 172/75, sitting/lyin/77  -CTH noncontrast showing slight disproportionate volume loss of the superior cerebellar vermis and superior cerebellar hemispheres bilaterally of unclear clinical significance  -Neurology recs: MRI brain/IAC w/wo gado, MRI cervical spine noncontrast---------------------------  -meclizine 25 mg PRN  -PT/OT eval.-- home with outpatient PT     Bradycardia.    - asymptomatic   - noted to be on metop 25 XL   - EKG with sinus chris, with TWI that are stable from prior EKGs  - holding metop   - now HR is WNL.    Abnormal EKG.   -NSR with T wave inversions in lateral leads similar to previous EKG  -Hx of CAD w/out stent placement  -troponin 38--> 12, w/out chest pain. Not likely ACS  -f/u echo w/ bubble-- inconclusive for PFO      Hypertension, unspecified type.    -c/w home meds: losartan, tamsulosin  - hold home metop 25 XL due to chris.     Diabetes mellitus.   -A1c 6.2  -held home metformin  -sliding scale.    HLD (hyperlipidemia).   -c/w home meds: atorvastatin 20 mg    CAD (coronary artery disease).    -hx of CAD without stent  -c/w aspirin, atorvastatin, metoprolol.    BPH (benign prostatic hyperplasia).    -hx of BPH  -c/w tamsulosin and oxybutynin.         63 y/o Male, with a PmHx of HTN, HLD, DM on metformin, CAD, BPH, remote hx CVA, who comes in with complaints of dizziness for 1 week. CTH with volume loss in cerebellar vermi & hemispheres b/l. MRI brain w/o contrast demonstrates acute infarct of the R cerebellum. MR C spine shows mild degenerative changes & mild cervical canal stenosis. Neuro consulted started DAPT. 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 Noted with symptomatic bradycardia with dizziness, positive orthostatics s/p IVF, started on meclizine, now S/P ILR with EP on 9/21, site stable C/D/I, no hematoma/bleeding. Patient remains hemodynamically stable and is cleared for discahrge home, outaptient followup with PCP, Neurology       Hospital Course:   CVA/Subacute Infarct    - Pt reports 1 week hx of dizziness, provoked with ambulation and standing but not quite worsened with head movement. Workup for Central vs Peripheral etiology   - hsTrop: 38-->12   - 9/12 Echo - EF 60%, Minimal mitral regurgitation. Normal left ventricular systolic function. Mild diastolic dysfunction (Stage I). Normal right ventricular size and function. A bubble study was performed with the intravenous injection of agitated saline contrast and was inconclusive regarding the presence of an interatrial shunt.   - CTH with volume loss in cerebellar vermi & hemispheres b/l  - MRI brain w/o contrast demonstrates acute infarct of the R cerebellum  - MR C spine shows mild degenerative changes & mild cervical canal stenosis, 1 cm enhancing focus involves the right cerebellar cortex, without associated restricted diffusion or surrounding vasogenic edema.   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 Naranjo or Dr. Ojeda: [phone] [address] [place] [address]   - FU EP appt on 10/12 at 2pm, [address]     Hypertension   - c/w home meds: losartan  - hold metoprolol d/t bradycardia     Bradycardia.   - asymptomatic   - noted to be on metop 25 XL at home   - EKG with sinus chris, with TWI that are stable from prior EKGs  - hold metoprolol for now  - now HR is WNL. Keep holding metop on DC, follow up with PCP  - s/p ILR on 9/21 with EP    Abnormal EKG.    - NSR with T wave inversions in lateral leads similar to previous EKG  - Hx of CAD w/out stent placement  - troponin 38--> 12, w/out chest pain. Not likely ACS      - monitor on tele  - c/w aspirin, atorvastatin  - Bradycardia to 40s w/lighheadedness, Hold metoprolol. Started on meclizine- resolving     - mildly positive Orthostatics s/p IVF on 9/20   - Echo completed, no significant findings.  - 9/20 RRT for Vtach likely with artifact, EP consulted- S/P ILR on 9/21- site stable   - FU EP appt on 10/12 at 2pm, [address]      Diabetes mellitus  - HgbA1C: 6.2%  - hold home metformin, sliding scale    CAD  - hx of CAD without stent  - c/w aspirin, atorvastatin  - hold home metoprolol due to bradycardia, followup with PCP    HLD  - c/w atorvastatin 20 mg     BPH  - hx of BPH  - c/w increased dose tamsulosin and oxybutynin     Dispo: with outpatient PT    On 9/22/2022, case was discussed with Dr. Calhoun, patient is medically cleared and optimized for discharge today. All medications were reviewed with attending, and sent to mutually agreed upon pharmacy.

## 2022-09-14 NOTE — DISCHARGE NOTE PROVIDER - NSDCACTIVITY_GEN_ALL_CORE
No restrictions Do not drive or operate machinery/No heavy lifting/straining/Follow Instructions Provided by your Surgical Team

## 2022-09-14 NOTE — DISCHARGE NOTE PROVIDER - CARE PROVIDER_API CALL
Tanner Lopez)  Internal Medicine  [address] [facility] [place], [address]  [place] [address]  Phone: [phone]  Fax: [phone]  Established Patient  Follow Up Time: 1 week    Lacho Ojeda)  Neurology; Vascular Neurology  [address] [facility], [address]  [place] [address]  Phone: [phone]  Fax: [phone]  Follow Up Time: 1 week

## 2022-09-14 NOTE — DISCHARGE NOTE PROVIDER - NSDCFUSCHEDAPPT_GEN_ALL_CORE_FT
[facility] Physician [facility] [phone]  Scheduled Appointment: 10/12/2022

## 2022-09-14 NOTE — DISCHARGE NOTE PROVIDER - NSDCHC_MEDRECSTATUS_GEN_ALL_CORE
Zhou Bird was in the clinic today to see Edgardo Baptiste MD for   Chief Complaint   Patient presents with   • Blood Pressure   .    Please note, his blood pressures were elevated x2 while in the clinic.    BP Readings from Last 1 Encounters:   02/17/22 1421 (!) 143/83   02/17/22 1416 (!) 144/84       Please review with PCP and follow up with patient as appropriate.   Admission Reconciliation is Completed  Discharge Reconciliation is Not Complete Admission Reconciliation is Completed  Discharge Reconciliation is Completed

## 2022-09-14 NOTE — DISCHARGE NOTE PROVIDER - NSDCMRMEDTOKEN_GEN_ALL_CORE_FT
aspirin 81 mg oral delayed release tablet: 1 tab(s) orally once a day  atorvastatin 20 mg oral tablet: 1 tab(s) orally once a day  losartan 50 mg oral tablet: 1 tab(s) orally once a day  metFORMIN 500 mg oral tablet: 1 tab(s) orally 2 times a day  metoprolol succinate 25 mg oral tablet, extended release: 1 tab(s) orally once a day  oxybutynin 10 mg/24 hr oral tablet, extended release: 1 tab(s) orally once a day  tamsulosin 0.4 mg oral capsule: 1 tab(s) orally 2 times a day   aspirin 81 mg oral delayed release tablet: 1 tab(s) orally once a day  atorvastatin 20 mg oral tablet: 1 tab(s) orally once a day  clopidogrel 75 mg oral tablet: 1 tab(s) orally once a day   for Total 90day therapy per neurology   losartan 50 mg oral tablet: 1 tab(s) orally once a day  HOLD for blood pressure less than 100  meclizine 25 mg oral tablet: 1 tab(s) orally every 12 hours, As needed, Dizziness  metFORMIN 500 mg oral tablet: 1 tab(s) orally 2 times a day  oxybutynin 10 mg/24 hr oral tablet, extended release: 1 tab(s) orally once a day  tamsulosin 0.4 mg oral capsule: 2 cap(s) orally once a day (at bedtime)

## 2022-09-14 NOTE — PROGRESS NOTE ADULT - PROBLEM SELECTOR PLAN 1
Pt reports 1 week hx of dizziness, provoked with ambulation and standing but not quite worsened with head movement. Workup for Central vs Peripheral etiology   DDx: stroke, vestibular dysfunction, plus possible cervical myelopathy.  -EKG NSR, HR 97, T wave inversions in lateral leads, unchanged from prior EKG  -orthostatics negative: standing 172/75, sitting/lyin/77  -CTH noncontrast showing slight disproportionate volume loss of the superior cerebellar vermis and superior cerebellar hemispheres bilaterally of unclear clinical significance  -Neurology recs: MRI brain/IAC w/wo gado, MRI cervical spine noncontrast  -meclizine 25 mg PRN  -PT/OT eval  - if MRI is negative for significant findings, DC to home with neurology follow up

## 2022-09-14 NOTE — DISCHARGE NOTE PROVIDER - NSDCFUADDAPPT_GEN_ALL_CORE_FT
Please follow up with your primary care provider within 1-2 weeks of discharge  APPTS ARE READY TO BE MADE: [x] YES    Best Family or Patient Contact (if needed): Patient: [phone], Daughter Dre [phone]    Additional Information about above appointments (if needed):  1: Patient needs close Stroke/Neurology followup outpatient within 1-2 weeks, preferably either Dr. Naranjo or Dr. Ojeda: [phone]   2. Patient will need outpatient interval MRA Head/neck with T1 Fat Sat suppression in 3-6 months, and Outpatient MRA NOVA to better characterize hemodynamics    Have a Electrophysiology appt on 10/12/22 at 2pm, at [address]    APPTS ARE READY TO BE MADE: [x] YES    Best Family or Patient Contact (if needed): Patient: [phone], Daughter Dre [phone]    Additional Information about above appointments (if needed):  1: Patient needs close Stroke/Neurology followup outpatient within 1-2 weeks, preferably either Dr. Naranjo or Dr. Ojeda: [phone]   2. Patient will need outpatient interval MRA Head/neck with T1 Fat Sat suppression in 3-6 months, and Outpatient MRA NOVA to better characterize hemodynamics    Have a Electrophysiology appt on 10/12/22 at 2pm, at [address]   										                                Patient was provided with follow up request details for Dr. Lopez and was advised to call to schedule follow up within specified time frame.   									                                           Patient was provided with follow up request details for Dr. Lacho Ojeda/ Dr. Jamie Naranjo and was advised to call to schedule follow up within specified time frame.    Repeat labwork within 1-2 weeks (CBC, BMP, LFTs)    APPTS ARE READY TO BE MADE: [x] YES    Best Family or Patient Contact (if needed): Patient: [phone], Daughter Dre [phone]    Additional Information about above appointments (if needed):  1: Patient needs close Stroke/Neurology followup outpatient within 1-2 weeks, preferably either Dr. Naranjo or Dr. Ojeda: [phone]   2. Patient will need outpatient interval MRA Head/neck with T1 Fat Sat suppression in 3-6 months, and Outpatient MRA NOVA to better characterize hemodynamics    Have a Electrophysiology appt on 10/12/22 at 2pm, at [address]   										                                Patient was provided with follow up request details for Dr. Lopez and was advised to call to schedule follow up within specified time frame.   									                                           Patient was provided with follow up request details for Dr. Lacho Ojeda/ Dr. Jamie Naranjo and was advised to call to schedule follow up within specified time frame.

## 2022-09-14 NOTE — DISCHARGE NOTE PROVIDER - DISCHARGE DIET
DASH Diet/Consistent Carbohydrate Diabetic Diets DASH Diet/Low Sodium Diet/Consistent Carbohydrate Diabetic Diets

## 2022-09-14 NOTE — DISCHARGE NOTE PROVIDER - NSDCCPCAREPLAN_GEN_ALL_CORE_FT
PRINCIPAL DISCHARGE DIAGNOSIS  Diagnosis: Dizziness  Assessment and Plan of Treatment: You came to the hospital [facility] 1 week of dizziness- you were seen by multiple specialists and had tests on your heart and images of your brain and neck. You were given meclizine for dizziness, and imaging of your brain showed-------------------      SECONDARY DISCHARGE DIAGNOSES  Diagnosis: Bradycardia  Assessment and Plan of Treatment: You had a slow heart rate in the hospital, and we monitored your heart with a monitor and stopped your metoprolol- please do not take metoprolol at home. Your heart rate is now in normal range. Please follow up with your PCP and/or cardiologist upon discharge. Please contact a provider and  return to the ER if you have palpitations, chest pain, trouble breathing, lightheadedness or if you pass out.    Diagnosis: Abnormal EKG  Assessment and Plan of Treatment: You had abnormalities on your EKG, that have been chronic. There were no signs of acute injury to your heart, and you did not have chest pain, shortness of breath or palpitations.  Please follow up with your PCP and/or cardiologist upon dishcarge. Please notify a provider and return to the ED if you have chest pain, trouble breathing, palpitations, or new swelling of the legs    Diagnosis: HLD (hyperlipidemia)  Assessment and Plan of Treatment: Continue prescribed medications to control your cholesterol levels and a DASH (Low fat/salt) diet. Follow up with your primary care provider upon discharge for further management and monitoring of cholesterol levels.    Diagnosis: Hypertension, unspecified type  Assessment and Plan of Treatment: Continue blood pressure medication regimen as directed. We stopped your metoprolol due to slow heart rate. Monitor for any visual changes, headaches or dizziness.  Monitor blood pressure regularly.  Follow up with your primary care provider for further management for high blood pressure.     PRINCIPAL DISCHARGE DIAGNOSIS  Diagnosis: Dizziness  Assessment and Plan of Treatment: You came to the hospital iw 1 week of dizziness- you were seen by multiple specialists and had tests on your heart and images of your brain and neck. You were given meclizine for dizziness, and imaging of your brain showed-------------------      SECONDARY DISCHARGE DIAGNOSES  Diagnosis: Hypertension, unspecified type  Assessment and Plan of Treatment: Continue blood pressure medication regimen as directed. We stopped your metoprolol due to slow heart rate. Monitor for any visual changes, headaches or dizziness.  Monitor blood pressure regularly.  Follow up with your primary care provider for further management for high blood pressure.    Diagnosis: Abnormal EKG  Assessment and Plan of Treatment: You had abnormalities on your EKG, that have been chronic. There were no signs of acute injury to your heart, and you did not have chest pain, shortness of breath or palpitations.  Please follow up with your PCP and/or cardiologist upon dishcarge. Please notify a provider and return to the ED if you have chest pain, trouble breathing, palpitations, or new swelling of the legs  You were seen by the electrophsyiologist, a Loop recorder --------------  You have a Electrophysiology appt on 10/12/22 at 2pm, at [phone]    Diagnosis: HLD (hyperlipidemia)  Assessment and Plan of Treatment: Continue prescribed medications to control your cholesterol levels and a DASH (Low fat/salt) diet. Follow up with your primary care provider upon discharge for further management and monitoring of cholesterol levels.    Diagnosis: Bradycardia  Assessment and Plan of Treatment: You had a slow heart rate in the hospital, and we monitored your heart with a monitor and stopped your metoprolol- please do not take metoprolol at home. Your heart rate is now in normal range. Please follow up with your PCP and/or cardiologist upon discharge. Please contact a provider and  return to the ER if you have palpitations, chest pain, trouble breathing, lightheadedness or if you pass out.     PRINCIPAL DISCHARGE DIAGNOSIS  Diagnosis: Dizziness  Assessment and Plan of Treatment: You came to the hospital with 1 week of dizziness- you were seen by multiple specialists and had tests on your heart and images of your brain and neck. 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   You were closely evaluated by neurology, continue plavix for 90days total (started on 9/14), and aspirin lifelong. Continue statin therapy.   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    Continue meclizine for dizziness as needed.   You also had a loop recorder placed on 9/21/22, followup with electrophsiology on 1/12 at 2pm , [phone]  ****As per neurosurgery You will need interval MRA Head/neck with T1 Fat Sat suppression to confirm dissection in 3-6 months **********************  Also You will need an Outpatient MRA NOVA to better characterize hemodynamics as per neurology.  Close followup with Neurology outpatient (office will for appointment, if you do not get a call within next 3-4 days, please call office of either Dr. Naranjo or Dr. Ojeda and state you were recently in the hospital: [phone] [address] [place] [address]      SECONDARY DISCHARGE DIAGNOSES  Diagnosis: Hypertension, unspecified type  Assessment and Plan of Treatment: Continue blood pressure medication regimen as directed. We stopped your metoprolol due to slow heart rate. Monitor for any visual changes, headaches or dizziness.  Monitor blood pressure regularly.  Follow up with your primary care provider for further management for high blood pressure.    Diagnosis: Abnormal EKG  Assessment and Plan of Treatment: You had abnormalities on your EKG, that have been chronic. There were no signs of acute injury to your heart, and you did not have chest pain, shortness of breath or palpitations.  You were also noted to have decreased heart rate, DO NOT take Metoprolol for now.  Echocardiogram with no significant findings.  You were seen by the electrophsyiologist, a Loop recorder was placed on 9/21/2022, site stable.  An information booklet was provided to you.   You are scheduled for a followup EP appointment on 10/12/22 @ 2pm, at at [phone]  You can return to normal activities 24hours after the procedure   No scrubbing the incision site  No lotion, ointment, powder or direct sunlight to the incision site for 2 weeks   No swimming pool, Jacuzzi, or bath for 7 days.   Patient can shower 24 hours after procedure. Pat the area dry  Pt was instructed to call [phone] if the following occurs:      - fever with temperature > 101F      - swelling, drainage or bleeding at the site incision  Followup appointment on 10/12 at 2pm, [id number]   Please follow up with your PCP and/or cardiologist upon dishcarge. Please notify a provider and return to the ED if you have chest pain, trouble breathing, palpitations, or new swelling of the legs.    Diagnosis: Bradycardia  Assessment and Plan of Treatment: You had a slow heart rate in the hospital, and we monitored your heart with a monitor and stopped your metoprolol- please do not take metoprolol at home. Your heart rate is now in normal range. Please follow up with your PCP and/or cardiologist upon discharge. Please contact a provider and  return to the ER if you have palpitations, chest pain, trouble breathing, lightheadedness or if you pass out.    Diagnosis: CAD (coronary artery disease)  Assessment and Plan of Treatment: Continue aspirin, atorvastatin  DO NOT take metoprolol due to bradycardia, followup with PCP    Diagnosis: HLD (hyperlipidemia)  Assessment and Plan of Treatment: Continue prescribed medications to control your cholesterol levels and a DASH (Low fat/salt) diet. Follow up with your primary care provider upon discharge for further management and monitoring of cholesterol levels.      Diagnosis: Diabetes mellitus  Assessment and Plan of Treatment: HgbA1C: 6.2% blood sugars well controlled. Continue your medication regimen and a consistent carbohydrate diet (Meaning eating the same amount of carbohydrates at the same time each day). Monitor blood glucose levels throughout the day before meals and at bedtime. Record blood sugars and bring to outpatient providers appointment in order to be reviewed by your doctor for management modifications. If your sugars are more than 400 or less than 70 you should contact your PCP immediately. Monitor for signs/symptoms of low blood glucose, such as, dizziness, altered mental status, or cool/clammy skin. In addition, monitor for signs/symptoms of high blood glucose, such as, feeling hot, dry, fatigued, or with increased thirst/urination. Make regular podiatry appointments in order to have feet checked for wounds and uncontrolled toe nail growth to prevent infections, as well as, appointments with an ophthalmologist to monitor your vision.Continue home metformin    Diagnosis: BPH (benign prostatic hyperplasia)  Assessment and Plan of Treatment: Continue tamsulosin and oxybutynin     PRINCIPAL DISCHARGE DIAGNOSIS  Diagnosis: Dizziness  Assessment and Plan of Treatment: You came to the hospital with 1 week of dizziness- you were seen by multiple specialists and had tests on your heart and images of your brain and neck. 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   You were closely evaluated by neurology, continue plavix for 90days total (started on 9/14), and aspirin lifelong. Continue statin therapy.   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    Continue meclizine for dizziness as needed. Suggest outpatient vestibular rehab if symptoms persist, follow up with your doctor  You also had a loop recorder placed on 9/21/22, followup with electrophsiology on 1/12 at 2pm , [address]  ****As per neurosurgery You will need interval MRA Head/neck with T1 Fat Sat suppression to confirm dissection in 3-6 months **********************  Also You will need an Outpatient MRA NOVA to better characterize hemodynamics as per neurology.  Close followup with Neurology outpatient (office will for appointment, if you do not get a call within next 3-4 days, please call office of either Dr. Naranjo or Dr. Ojeda and state you were recently in the hospital: [phone] [address] [place] [address]      SECONDARY DISCHARGE DIAGNOSES  Diagnosis: Hypertension, unspecified type  Assessment and Plan of Treatment: Continue blood pressure medication regimen as directed. We stopped your metoprolol due to slow heart rate. Monitor for any visual changes, headaches or dizziness.  Monitor blood pressure regularly.  Follow up with your primary care provider for further management for high blood pressure.    Diagnosis: Abnormal EKG  Assessment and Plan of Treatment: You had abnormalities on your EKG, that have been chronic. There were no signs of acute injury to your heart, and you did not have chest pain, shortness of breath or palpitations.  You were also noted to have decreased heart rate, DO NOT take Metoprolol for now.  Echocardiogram with no significant findings.  You were seen by the electrophsyiologist, a Loop recorder was placed on 9/21/2022, site stable.  An information booklet was provided to you.   You are scheduled for a followup EP appointment on 10/12/22 @ 2pm, at at [address]  You can return to normal activities 24hours after the procedure   No scrubbing the incision site  No lotion, ointment, powder or direct sunlight to the incision site for 2 weeks   No swimming pool, Jacuzzi, or bath for 7 days.   Patient can shower 24 hours after procedure. Pat the area dry  Pt was instructed to call [phone] if the following occurs:      - fever with temperature > 101F      - swelling, drainage or bleeding at the site incision  Followup appointment on 10/12 at 2pm, [phone]   Please follow up with your PCP and/or cardiologist upon dishcarge. Please notify a provider and return to the ED if you have chest pain, trouble breathing, palpitations, or new swelling of the legs.    Diagnosis: Bradycardia  Assessment and Plan of Treatment: You had a slow heart rate in the hospital, and we monitored your heart with a monitor and stopped your metoprolol- please do not take metoprolol at home. Your heart rate is now in normal range. Please follow up with your PCP and/or cardiologist upon discharge. Please contact a provider and  return to the ER if you have palpitations, chest pain, trouble breathing, lightheadedness or if you pass out.    Diagnosis: CAD (coronary artery disease)  Assessment and Plan of Treatment: Continue aspirin, atorvastatin  DO NOT take metoprolol due to bradycardia, followup with PCP    Diagnosis: HLD (hyperlipidemia)  Assessment and Plan of Treatment: Continue prescribed medications to control your cholesterol levels and a DASH (Low fat/salt) diet. Follow up with your primary care provider upon discharge for further management and monitoring of cholesterol levels.      Diagnosis: Diabetes mellitus  Assessment and Plan of Treatment: HgbA1C: 6.2% blood sugars well controlled. Continue your medication regimen and a consistent carbohydrate diet (Meaning eating the same amount of carbohydrates at the same time each day). Monitor blood glucose levels throughout the day before meals and at bedtime. Record blood sugars and bring to outpatient providers appointment in order to be reviewed by your doctor for management modifications. If your sugars are more than 400 or less than 70 you should contact your PCP immediately. Monitor for signs/symptoms of low blood glucose, such as, dizziness, altered mental status, or cool/clammy skin. In addition, monitor for signs/symptoms of high blood glucose, such as, feeling hot, dry, fatigued, or with increased thirst/urination. Make regular podiatry appointments in order to have feet checked for wounds and uncontrolled toe nail growth to prevent infections, as well as, appointments with an ophthalmologist to monitor your vision.Continue home metformin    Diagnosis: BPH (benign prostatic hyperplasia)  Assessment and Plan of Treatment: Continue tamsulosin and oxybutynin

## 2022-09-14 NOTE — DISCHARGE NOTE PROVIDER - PROVIDER TOKENS
PROVIDER:[TOKEN:[[id number]],FOLLOWUP:[1 week],ESTABLISHEDPATIENT:[T]],PROVIDER:[TOKEN:[[id number]],FOLLOWUP:[1 week]]

## 2022-09-15 LAB
ANION GAP SERPL CALC-SCNC: 13 MMOL/L — SIGNIFICANT CHANGE UP (ref 7–14)
BUN SERPL-MCNC: 14 MG/DL — SIGNIFICANT CHANGE UP (ref 7–23)
CALCIUM SERPL-MCNC: 9 MG/DL — SIGNIFICANT CHANGE UP (ref 8.4–10.5)
CHLORIDE SERPL-SCNC: 106 MMOL/L — SIGNIFICANT CHANGE UP (ref 98–107)
CO2 SERPL-SCNC: 23 MMOL/L — SIGNIFICANT CHANGE UP (ref 22–31)
CREAT SERPL-MCNC: 0.72 MG/DL — SIGNIFICANT CHANGE UP (ref 0.5–1.3)
EGFR: 103 ML/MIN/1.73M2 — SIGNIFICANT CHANGE UP
GLUCOSE BLDC GLUCOMTR-MCNC: 112 MG/DL — HIGH (ref 70–99)
GLUCOSE BLDC GLUCOMTR-MCNC: 117 MG/DL — HIGH (ref 70–99)
GLUCOSE BLDC GLUCOMTR-MCNC: 143 MG/DL — HIGH (ref 70–99)
GLUCOSE BLDC GLUCOMTR-MCNC: 145 MG/DL — HIGH (ref 70–99)
GLUCOSE SERPL-MCNC: 114 MG/DL — HIGH (ref 70–99)
HCT VFR BLD CALC: 44.2 % — SIGNIFICANT CHANGE UP (ref 39–50)
HGB BLD-MCNC: 13.9 G/DL — SIGNIFICANT CHANGE UP (ref 13–17)
MAGNESIUM SERPL-MCNC: 1.7 MG/DL — SIGNIFICANT CHANGE UP (ref 1.6–2.6)
MCHC RBC-ENTMCNC: 25.9 PG — LOW (ref 27–34)
MCHC RBC-ENTMCNC: 31.4 GM/DL — LOW (ref 32–36)
MCV RBC AUTO: 82.3 FL — SIGNIFICANT CHANGE UP (ref 80–100)
NRBC # BLD: 0 /100 WBCS — SIGNIFICANT CHANGE UP (ref 0–0)
NRBC # FLD: 0 K/UL — SIGNIFICANT CHANGE UP (ref 0–0)
PHOSPHATE SERPL-MCNC: 3.1 MG/DL — SIGNIFICANT CHANGE UP (ref 2.5–4.5)
PLATELET # BLD AUTO: 232 K/UL — SIGNIFICANT CHANGE UP (ref 150–400)
POTASSIUM SERPL-MCNC: 4.1 MMOL/L — SIGNIFICANT CHANGE UP (ref 3.5–5.3)
POTASSIUM SERPL-SCNC: 4.1 MMOL/L — SIGNIFICANT CHANGE UP (ref 3.5–5.3)
RBC # BLD: 5.37 M/UL — SIGNIFICANT CHANGE UP (ref 4.2–5.8)
RBC # FLD: 13.2 % — SIGNIFICANT CHANGE UP (ref 10.3–14.5)
SODIUM SERPL-SCNC: 142 MMOL/L — SIGNIFICANT CHANGE UP (ref 135–145)
WBC # BLD: 6.41 K/UL — SIGNIFICANT CHANGE UP (ref 3.8–10.5)
WBC # FLD AUTO: 6.41 K/UL — SIGNIFICANT CHANGE UP (ref 3.8–10.5)

## 2022-09-15 PROCEDURE — 99233 SBSQ HOSP IP/OBS HIGH 50: CPT

## 2022-09-15 RX ADMIN — CLOPIDOGREL BISULFATE 75 MILLIGRAM(S): 75 TABLET, FILM COATED ORAL at 11:39

## 2022-09-15 RX ADMIN — Medication 10 MILLIGRAM(S): at 11:39

## 2022-09-15 RX ADMIN — Medication 81 MILLIGRAM(S): at 11:39

## 2022-09-15 RX ADMIN — TAMSULOSIN HYDROCHLORIDE 0.8 MILLIGRAM(S): 0.4 CAPSULE ORAL at 21:39

## 2022-09-15 RX ADMIN — LOSARTAN POTASSIUM 50 MILLIGRAM(S): 100 TABLET, FILM COATED ORAL at 05:20

## 2022-09-15 RX ADMIN — ATORVASTATIN CALCIUM 20 MILLIGRAM(S): 80 TABLET, FILM COATED ORAL at 21:39

## 2022-09-15 NOTE — PROGRESS NOTE ADULT - PROBLEM SELECTOR PLAN 1
Pt reports 1 week hx of dizziness, provoked with ambulation and standing but not quite worsened with head movement. Workup for Central vs Peripheral etiology   DDx: stroke, vestibular dysfunction, plus possible cervical myelopathy.  -EKG NSR, HR 97, T wave inversions in lateral leads, unchanged from prior EKG  -orthostatics negative: standing 172/75, sitting/lyin/77  -CTH noncontrast showing slight disproportionate volume loss of the superior cerebellar vermis and superior cerebellar hemispheres bilaterally of unclear clinical significance  - MRI with 1 cm enhancing focus involves the right cerebellar cortex, without associated restricted diffusion or surrounding vasogenic edema.   - neuro recs appreciated: will obtain CTA H/N w/ IV contrast   - continue aspirin indef, length of plavix or ILR based on CT findings   - meclizine 25 mg PRN  - PT/OT eval

## 2022-09-16 LAB
ANION GAP SERPL CALC-SCNC: 9 MMOL/L — SIGNIFICANT CHANGE UP (ref 7–14)
BASOPHILS # BLD AUTO: 0.04 K/UL — SIGNIFICANT CHANGE UP (ref 0–0.2)
BASOPHILS NFR BLD AUTO: 0.6 % — SIGNIFICANT CHANGE UP (ref 0–2)
BUN SERPL-MCNC: 20 MG/DL — SIGNIFICANT CHANGE UP (ref 7–23)
CALCIUM SERPL-MCNC: 9.1 MG/DL — SIGNIFICANT CHANGE UP (ref 8.4–10.5)
CHLORIDE SERPL-SCNC: 104 MMOL/L — SIGNIFICANT CHANGE UP (ref 98–107)
CO2 SERPL-SCNC: 22 MMOL/L — SIGNIFICANT CHANGE UP (ref 22–31)
CREAT SERPL-MCNC: 0.75 MG/DL — SIGNIFICANT CHANGE UP (ref 0.5–1.3)
EGFR: 102 ML/MIN/1.73M2 — SIGNIFICANT CHANGE UP
EOSINOPHIL # BLD AUTO: 0.49 K/UL — SIGNIFICANT CHANGE UP (ref 0–0.5)
EOSINOPHIL NFR BLD AUTO: 7 % — HIGH (ref 0–6)
GLUCOSE BLDC GLUCOMTR-MCNC: 129 MG/DL — HIGH (ref 70–99)
GLUCOSE BLDC GLUCOMTR-MCNC: 143 MG/DL — HIGH (ref 70–99)
GLUCOSE BLDC GLUCOMTR-MCNC: 181 MG/DL — HIGH (ref 70–99)
GLUCOSE BLDC GLUCOMTR-MCNC: 89 MG/DL — SIGNIFICANT CHANGE UP (ref 70–99)
GLUCOSE SERPL-MCNC: 107 MG/DL — HIGH (ref 70–99)
HCT VFR BLD CALC: 44 % — SIGNIFICANT CHANGE UP (ref 39–50)
HGB BLD-MCNC: 14.6 G/DL — SIGNIFICANT CHANGE UP (ref 13–17)
IANC: 3.93 K/UL — SIGNIFICANT CHANGE UP (ref 1.8–7.4)
IMM GRANULOCYTES NFR BLD AUTO: 0.1 % — SIGNIFICANT CHANGE UP (ref 0–0.9)
LYMPHOCYTES # BLD AUTO: 1.87 K/UL — SIGNIFICANT CHANGE UP (ref 1–3.3)
LYMPHOCYTES # BLD AUTO: 26.6 % — SIGNIFICANT CHANGE UP (ref 13–44)
MAGNESIUM SERPL-MCNC: 1.8 MG/DL — SIGNIFICANT CHANGE UP (ref 1.6–2.6)
MCHC RBC-ENTMCNC: 26.6 PG — LOW (ref 27–34)
MCHC RBC-ENTMCNC: 33.2 GM/DL — SIGNIFICANT CHANGE UP (ref 32–36)
MCV RBC AUTO: 80.3 FL — SIGNIFICANT CHANGE UP (ref 80–100)
MONOCYTES # BLD AUTO: 0.68 K/UL — SIGNIFICANT CHANGE UP (ref 0–0.9)
MONOCYTES NFR BLD AUTO: 9.7 % — SIGNIFICANT CHANGE UP (ref 2–14)
NEUTROPHILS # BLD AUTO: 3.93 K/UL — SIGNIFICANT CHANGE UP (ref 1.8–7.4)
NEUTROPHILS NFR BLD AUTO: 56 % — SIGNIFICANT CHANGE UP (ref 43–77)
NRBC # BLD: 0 /100 WBCS — SIGNIFICANT CHANGE UP (ref 0–0)
NRBC # FLD: 0 K/UL — SIGNIFICANT CHANGE UP (ref 0–0)
PLATELET # BLD AUTO: 246 K/UL — SIGNIFICANT CHANGE UP (ref 150–400)
POTASSIUM SERPL-MCNC: 4.1 MMOL/L — SIGNIFICANT CHANGE UP (ref 3.5–5.3)
POTASSIUM SERPL-SCNC: 4.1 MMOL/L — SIGNIFICANT CHANGE UP (ref 3.5–5.3)
RBC # BLD: 5.48 M/UL — SIGNIFICANT CHANGE UP (ref 4.2–5.8)
RBC # FLD: 13.2 % — SIGNIFICANT CHANGE UP (ref 10.3–14.5)
SARS-COV-2 RNA SPEC QL NAA+PROBE: SIGNIFICANT CHANGE UP
SODIUM SERPL-SCNC: 135 MMOL/L — SIGNIFICANT CHANGE UP (ref 135–145)
WBC # BLD: 7.02 K/UL — SIGNIFICANT CHANGE UP (ref 3.8–10.5)
WBC # FLD AUTO: 7.02 K/UL — SIGNIFICANT CHANGE UP (ref 3.8–10.5)

## 2022-09-16 PROCEDURE — 70498 CT ANGIOGRAPHY NECK: CPT | Mod: 26

## 2022-09-16 PROCEDURE — 99232 SBSQ HOSP IP/OBS MODERATE 35: CPT

## 2022-09-16 PROCEDURE — 70496 CT ANGIOGRAPHY HEAD: CPT | Mod: 26

## 2022-09-16 RX ADMIN — Medication 10 MILLIGRAM(S): at 12:46

## 2022-09-16 RX ADMIN — Medication 81 MILLIGRAM(S): at 12:47

## 2022-09-16 RX ADMIN — ATORVASTATIN CALCIUM 20 MILLIGRAM(S): 80 TABLET, FILM COATED ORAL at 21:57

## 2022-09-16 RX ADMIN — CLOPIDOGREL BISULFATE 75 MILLIGRAM(S): 75 TABLET, FILM COATED ORAL at 12:46

## 2022-09-16 RX ADMIN — TAMSULOSIN HYDROCHLORIDE 0.8 MILLIGRAM(S): 0.4 CAPSULE ORAL at 21:57

## 2022-09-16 RX ADMIN — LOSARTAN POTASSIUM 50 MILLIGRAM(S): 100 TABLET, FILM COATED ORAL at 05:50

## 2022-09-16 RX ADMIN — Medication 25 MILLIGRAM(S): at 12:51

## 2022-09-16 NOTE — DIETITIAN INITIAL EVALUATION ADULT - PERTINENT MEDS FT
MEDICATIONS  (STANDING):  aspirin enteric coated 81 milliGRAM(s) Oral daily  atorvastatin 20 milliGRAM(s) Oral at bedtime  clopidogrel Tablet 75 milliGRAM(s) Oral daily  dextrose 5%. 1000 milliLiter(s) (100 mL/Hr) IV Continuous <Continuous>  dextrose 5%. 1000 milliLiter(s) (50 mL/Hr) IV Continuous <Continuous>  dextrose 50% Injectable 25 Gram(s) IV Push once  dextrose 50% Injectable 12.5 Gram(s) IV Push once  dextrose 50% Injectable 25 Gram(s) IV Push once  glucagon  Injectable 1 milliGRAM(s) IntraMuscular once  influenza   Vaccine 0.5 milliLiter(s) IntraMuscular once  insulin lispro (ADMELOG) corrective regimen sliding scale   SubCutaneous three times a day before meals  insulin lispro (ADMELOG) corrective regimen sliding scale   SubCutaneous at bedtime  losartan 50 milliGRAM(s) Oral daily  oxybutynin XL 10 milliGRAM(s) Oral every 24 hours  tamsulosin 0.8 milliGRAM(s) Oral at bedtime    MEDICATIONS  (PRN):  acetaminophen     Tablet .. 650 milliGRAM(s) Oral every 6 hours PRN Temp greater or equal to 38C (100.4F), Mild Pain (1 - 3)  aluminum hydroxide/magnesium hydroxide/simethicone Suspension 30 milliLiter(s) Oral every 4 hours PRN Dyspepsia  dextrose Oral Gel 15 Gram(s) Oral once PRN Blood Glucose LESS THAN 70 milliGRAM(s)/deciliter  meclizine 25 milliGRAM(s) Oral every 12 hours PRN Dizziness  melatonin 3 milliGRAM(s) Oral at bedtime PRN Insomnia

## 2022-09-16 NOTE — DIETITIAN INITIAL EVALUATION ADULT - NS FNS DIET ORDER
Diet, DASH/TLC:   Sodium & Cholesterol Restricted  Consistent Carbohydrate {No Snacks} (CSTCHO) (09-11-22 @ 07:30)

## 2022-09-16 NOTE — PROGRESS NOTE ADULT - PROBLEM SELECTOR PLAN 1
Pt reports 1 week hx of dizziness, provoked with ambulation and standing but not quite worsened with head movement. Workup for Central vs Peripheral etiology   DDx: stroke, vestibular dysfunction, plus possible cervical myelopathy.  -EKG NSR, HR 97, T wave inversions in lateral leads, unchanged from prior EKG  -orthostatics negative: standing 172/75, sitting/lyin/77  -CTH noncontrast showing slight disproportionate volume loss of the superior cerebellar vermis and superior cerebellar hemispheres bilaterally of unclear clinical significance  - MRI with 1 cm enhancing focus involves the right cerebellar cortex, without associated restricted diffusion or surrounding vasogenic edema.   - neuro recs appreciated: will obtain CTA H/N w/ IV contrast   - continue aspirin indef, length of plavix or ILR based on CT findings   - meclizine 25 mg PRN  - PT/OT eval Pt reports 1 week hx of dizziness, provoked with ambulation and standing but not quite worsened with head movement. Workup for Central vs Peripheral etiology   DDx: stroke, vestibular dysfunction, plus possible cervical myelopathy.  -EKG NSR, HR 97, T wave inversions in lateral leads, unchanged from prior EKG  -orthostatics negative: standing 172/75, sitting/lyin/77  -CTH noncontrast showing slight disproportionate volume loss of the superior cerebellar vermis and superior cerebellar hemispheres bilaterally of unclear clinical significance  - MRI with 1 cm enhancing focus involves the right cerebellar cortex, without associated restricted diffusion or surrounding vasogenic edema.   - CT H&N - showed possible dissection flap of the right internal carotid distal to the takeoff of the right opthalmic artery   - calling Neurosurgery, will f/u on recs  - neuro recs   - continue aspirin indef, length of plavix or ILR based on CT findings   - meclizine 25 mg PRN  - PT/OT eval

## 2022-09-16 NOTE — DIETITIAN INITIAL EVALUATION ADULT - PERTINENT LABORATORY DATA
09-16    135  |  104  |  20  ----------------------------<  107<H>  4.1   |  22  |  0.75    Ca    9.1      16 Sep 2022 05:52  Phos  3.1     09-15  Mg     1.80     09-16    POCT Blood Glucose.: 129 mg/dL (09-16-22 @ 09:01)  A1C with Estimated Average Glucose Result: 6.2 % (09-09-22 @ 12:00)

## 2022-09-16 NOTE — DIETITIAN INITIAL EVALUATION ADULT - ORAL INTAKE PTA/DIET HISTORY
Pt states his appetite po intake have been good. Pt endorses following a consistent carb, low sodium low cholesterol diet at home.

## 2022-09-16 NOTE — DIETITIAN INITIAL EVALUATION ADULT - NSICDXPASTMEDICALHX_GEN_ALL_CORE_FT
PAST MEDICAL HISTORY:  CAD (coronary artery disease)     Diabetes mellitus     Hypercholesteremia     Hypertension, unspecified type

## 2022-09-16 NOTE — DIETITIAN INITIAL EVALUATION ADULT - OTHER INFO
Pt has a history of DM2, HTN, HLD, CAD , BPH and remote history of CVA. Pt presented with complaint of dizziness for 1 week. Pt was found to have evolving subacute infarct.  Pt states his appetite has been good and he has been tolerating  po intake well. Pt has no complaints of GI distress nor difficulty chewing or swallowing. Pt has no known food allergies.   Pt reports his usual weight is 135 lb which was also his admission weight.  Pt endorses following a consistent carb, DASH diet at home. He states he controls his blood glucose levels with Metformin and diet. Reviewed diet with Pt who appears to have a good understanding of it using teach back.

## 2022-09-17 LAB
ANION GAP SERPL CALC-SCNC: 12 MMOL/L — SIGNIFICANT CHANGE UP (ref 7–14)
BASOPHILS # BLD AUTO: 0.05 K/UL — SIGNIFICANT CHANGE UP (ref 0–0.2)
BASOPHILS NFR BLD AUTO: 0.7 % — SIGNIFICANT CHANGE UP (ref 0–2)
BUN SERPL-MCNC: 21 MG/DL — SIGNIFICANT CHANGE UP (ref 7–23)
CALCIUM SERPL-MCNC: 9.1 MG/DL — SIGNIFICANT CHANGE UP (ref 8.4–10.5)
CHLORIDE SERPL-SCNC: 99 MMOL/L — SIGNIFICANT CHANGE UP (ref 98–107)
CO2 SERPL-SCNC: 23 MMOL/L — SIGNIFICANT CHANGE UP (ref 22–31)
CREAT SERPL-MCNC: 0.88 MG/DL — SIGNIFICANT CHANGE UP (ref 0.5–1.3)
EGFR: 97 ML/MIN/1.73M2 — SIGNIFICANT CHANGE UP
EOSINOPHIL # BLD AUTO: 0.38 K/UL — SIGNIFICANT CHANGE UP (ref 0–0.5)
EOSINOPHIL NFR BLD AUTO: 5.5 % — SIGNIFICANT CHANGE UP (ref 0–6)
GLUCOSE BLDC GLUCOMTR-MCNC: 121 MG/DL — HIGH (ref 70–99)
GLUCOSE BLDC GLUCOMTR-MCNC: 133 MG/DL — HIGH (ref 70–99)
GLUCOSE BLDC GLUCOMTR-MCNC: 87 MG/DL — SIGNIFICANT CHANGE UP (ref 70–99)
GLUCOSE SERPL-MCNC: 167 MG/DL — HIGH (ref 70–99)
HCT VFR BLD CALC: 46.5 % — SIGNIFICANT CHANGE UP (ref 39–50)
HGB BLD-MCNC: 14.6 G/DL — SIGNIFICANT CHANGE UP (ref 13–17)
IANC: 4.05 K/UL — SIGNIFICANT CHANGE UP (ref 1.8–7.4)
IMM GRANULOCYTES NFR BLD AUTO: 0.3 % — SIGNIFICANT CHANGE UP (ref 0–0.9)
LYMPHOCYTES # BLD AUTO: 1.85 K/UL — SIGNIFICANT CHANGE UP (ref 1–3.3)
LYMPHOCYTES # BLD AUTO: 26.5 % — SIGNIFICANT CHANGE UP (ref 13–44)
MAGNESIUM SERPL-MCNC: 1.8 MG/DL — SIGNIFICANT CHANGE UP (ref 1.6–2.6)
MCHC RBC-ENTMCNC: 26 PG — LOW (ref 27–34)
MCHC RBC-ENTMCNC: 31.4 GM/DL — LOW (ref 32–36)
MCV RBC AUTO: 82.7 FL — SIGNIFICANT CHANGE UP (ref 80–100)
MONOCYTES # BLD AUTO: 0.62 K/UL — SIGNIFICANT CHANGE UP (ref 0–0.9)
MONOCYTES NFR BLD AUTO: 8.9 % — SIGNIFICANT CHANGE UP (ref 2–14)
NEUTROPHILS # BLD AUTO: 4.05 K/UL — SIGNIFICANT CHANGE UP (ref 1.8–7.4)
NEUTROPHILS NFR BLD AUTO: 58.1 % — SIGNIFICANT CHANGE UP (ref 43–77)
NRBC # BLD: 0 /100 WBCS — SIGNIFICANT CHANGE UP (ref 0–0)
NRBC # FLD: 0 K/UL — SIGNIFICANT CHANGE UP (ref 0–0)
PLATELET # BLD AUTO: 240 K/UL — SIGNIFICANT CHANGE UP (ref 150–400)
POTASSIUM SERPL-MCNC: 4.5 MMOL/L — SIGNIFICANT CHANGE UP (ref 3.5–5.3)
POTASSIUM SERPL-SCNC: 4.5 MMOL/L — SIGNIFICANT CHANGE UP (ref 3.5–5.3)
RBC # BLD: 5.62 M/UL — SIGNIFICANT CHANGE UP (ref 4.2–5.8)
RBC # FLD: 13.2 % — SIGNIFICANT CHANGE UP (ref 10.3–14.5)
SODIUM SERPL-SCNC: 134 MMOL/L — LOW (ref 135–145)
WBC # BLD: 6.97 K/UL — SIGNIFICANT CHANGE UP (ref 3.8–10.5)
WBC # FLD AUTO: 6.97 K/UL — SIGNIFICANT CHANGE UP (ref 3.8–10.5)

## 2022-09-17 PROCEDURE — 99233 SBSQ HOSP IP/OBS HIGH 50: CPT

## 2022-09-17 PROCEDURE — 99254 IP/OBS CNSLTJ NEW/EST MOD 60: CPT

## 2022-09-17 RX ADMIN — Medication 25 MILLIGRAM(S): at 21:09

## 2022-09-17 RX ADMIN — TAMSULOSIN HYDROCHLORIDE 0.8 MILLIGRAM(S): 0.4 CAPSULE ORAL at 21:11

## 2022-09-17 RX ADMIN — Medication 81 MILLIGRAM(S): at 13:22

## 2022-09-17 RX ADMIN — Medication 10 MILLIGRAM(S): at 13:22

## 2022-09-17 RX ADMIN — CLOPIDOGREL BISULFATE 75 MILLIGRAM(S): 75 TABLET, FILM COATED ORAL at 13:23

## 2022-09-17 RX ADMIN — ATORVASTATIN CALCIUM 20 MILLIGRAM(S): 80 TABLET, FILM COATED ORAL at 21:11

## 2022-09-17 RX ADMIN — LOSARTAN POTASSIUM 50 MILLIGRAM(S): 100 TABLET, FILM COATED ORAL at 06:05

## 2022-09-17 NOTE — CHART NOTE - NSCHARTNOTEFT_GEN_A_CORE
< from: CT Angio Neck w/ IV Cont (09.16.22 @ 20:00) >    IMPRESSION:  CT ANGIOGRAPHY NECK:  30% right internal carotid artery stenosis per NASCET criteria.    Distal right V4 segment nonvisualization beginning at the level of C2   with intracranial reconstitution at the level of the right PICA.    CT ANGIOGRAPHY BRAIN:  Linear hypoattenuating focus within the right internal carotid artery   just distal to the takeoff of the right ophthalmic artery, concerning for   dissection flap. The remainder of the right internal carotid artery prior   to the bifurcation is well opacified.    < end of copied text >    Recommendation:  -Continue DAPT therapy for 3 months  -MRA head and neck with T1 fat saturation to confirm dissection   -Outpatient MRA NOVA to better characterize hemodynamics     Discussed with Dr. Fang stroke fellow and Dr. Gonzalez Neurology attending. < from: CT Angio Neck w/ IV Cont (09.16.22 @ 20:00) >    IMPRESSION:  CT ANGIOGRAPHY NECK:  30% right internal carotid artery stenosis per NASCET criteria.    Distal right V4 segment nonvisualization beginning at the level of C2   with intracranial reconstitution at the level of the right PICA.    CT ANGIOGRAPHY BRAIN:  Linear hypoattenuating focus within the right internal carotid artery   just distal to the takeoff of the right ophthalmic artery, concerning for   dissection flap. The remainder of the right internal carotid artery prior   to the bifurcation is well opacified.    < end of copied text >    Recommendation:  -Continue DAPT therapy for 3 months  -MRA head and neck with T1 fat saturation to confirm dissection   -Outpatient MRA NOVA to better characterize hemodynamics     Discussed with Dr. Fang stroke fellow and Dr. Gonzalez Neurology attending.  Thank you

## 2022-09-17 NOTE — CONSULT NOTE ADULT - ATTENDING COMMENTS
Enhancing focus in the R cerebellum which may be a subacute infarct, though other etiologies such as neoplasm or inflammatory are possible. CTA head/neck shows 30% R ICA stenosis and a "linear hypoattenuating focus within the right internal carotid artery just distal to the takeoff of the right ophthalmic artery, concerning for dissection flap." This is not appreciated on the SPGR post contrast sequence on the MRI. There is intracranial atherosclerotic disease in the L >R cavernous ICA as well. This finding could be a dissection or could be related to intracranial athero. Regardless, there is no significant stenosis and it would not explain isolated dizziness. Patient is already on DAPT which is max medical management for athero and dissection, would continue that and obtain interval MRA in 3-6 months. No intervention warranted at this time.

## 2022-09-17 NOTE — PROGRESS NOTE ADULT - PROBLEM SELECTOR PLAN 1
Pt reports 1 week hx of dizziness, provoked with ambulation and standing but not quite worsened with head movement. Workup for Central vs Peripheral etiology   DDx: stroke, vestibular dysfunction, plus possible cervical myelopathy.  -EKG NSR, HR 97, T wave inversions in lateral leads, unchanged from prior EKG  -orthostatics negative: standing 172/75, sitting/lyin/77  -CTH noncontrast showing slight disproportionate volume loss of the superior cerebellar vermis and superior cerebellar hemispheres bilaterally of unclear clinical significance  - MRI with 1 cm enhancing focus involves the right cerebellar cortex, without associated restricted diffusion or surrounding vasogenic edema.   - CT H&N - showed possible dissection flap of the right internal carotid distal to the takeoff of the right opthalmic artery   - calling Neurosurgery, will f/u on recs  - neuro recs   - continue aspirin indef, length of plavix or ILR based on CT findings   - meclizine 25 mg PRN  - PT/OT eval

## 2022-09-17 NOTE — CONSULT NOTE ADULT - PROBLEM SELECTOR RECOMMENDATION 9
- No neurosurgical or neuro-vascular intervention needed  - Case D/w Dr. Bennie Joseph  - Continue management of DAPT per neurology.

## 2022-09-18 LAB
ANION GAP SERPL CALC-SCNC: 10 MMOL/L — SIGNIFICANT CHANGE UP (ref 7–14)
BASOPHILS # BLD AUTO: 0.06 K/UL — SIGNIFICANT CHANGE UP (ref 0–0.2)
BASOPHILS NFR BLD AUTO: 0.9 % — SIGNIFICANT CHANGE UP (ref 0–2)
BUN SERPL-MCNC: 21 MG/DL — SIGNIFICANT CHANGE UP (ref 7–23)
CALCIUM SERPL-MCNC: 9.1 MG/DL — SIGNIFICANT CHANGE UP (ref 8.4–10.5)
CHLORIDE SERPL-SCNC: 105 MMOL/L — SIGNIFICANT CHANGE UP (ref 98–107)
CO2 SERPL-SCNC: 22 MMOL/L — SIGNIFICANT CHANGE UP (ref 22–31)
CREAT SERPL-MCNC: 0.8 MG/DL — SIGNIFICANT CHANGE UP (ref 0.5–1.3)
EGFR: 100 ML/MIN/1.73M2 — SIGNIFICANT CHANGE UP
EOSINOPHIL # BLD AUTO: 0.39 K/UL — SIGNIFICANT CHANGE UP (ref 0–0.5)
EOSINOPHIL NFR BLD AUTO: 5.9 % — SIGNIFICANT CHANGE UP (ref 0–6)
GLUCOSE BLDC GLUCOMTR-MCNC: 123 MG/DL — HIGH (ref 70–99)
GLUCOSE BLDC GLUCOMTR-MCNC: 135 MG/DL — HIGH (ref 70–99)
GLUCOSE BLDC GLUCOMTR-MCNC: 219 MG/DL — HIGH (ref 70–99)
GLUCOSE BLDC GLUCOMTR-MCNC: 97 MG/DL — SIGNIFICANT CHANGE UP (ref 70–99)
GLUCOSE SERPL-MCNC: 108 MG/DL — HIGH (ref 70–99)
HCT VFR BLD CALC: 42.4 % — SIGNIFICANT CHANGE UP (ref 39–50)
HGB BLD-MCNC: 13.9 G/DL — SIGNIFICANT CHANGE UP (ref 13–17)
IANC: 3.76 K/UL — SIGNIFICANT CHANGE UP (ref 1.8–7.4)
IMM GRANULOCYTES NFR BLD AUTO: 0.2 % — SIGNIFICANT CHANGE UP (ref 0–0.9)
LYMPHOCYTES # BLD AUTO: 1.83 K/UL — SIGNIFICANT CHANGE UP (ref 1–3.3)
LYMPHOCYTES # BLD AUTO: 27.5 % — SIGNIFICANT CHANGE UP (ref 13–44)
MAGNESIUM SERPL-MCNC: 1.7 MG/DL — SIGNIFICANT CHANGE UP (ref 1.6–2.6)
MCHC RBC-ENTMCNC: 26.2 PG — LOW (ref 27–34)
MCHC RBC-ENTMCNC: 32.8 GM/DL — SIGNIFICANT CHANGE UP (ref 32–36)
MCV RBC AUTO: 80 FL — SIGNIFICANT CHANGE UP (ref 80–100)
MONOCYTES # BLD AUTO: 0.6 K/UL — SIGNIFICANT CHANGE UP (ref 0–0.9)
MONOCYTES NFR BLD AUTO: 9 % — SIGNIFICANT CHANGE UP (ref 2–14)
NEUTROPHILS # BLD AUTO: 3.76 K/UL — SIGNIFICANT CHANGE UP (ref 1.8–7.4)
NEUTROPHILS NFR BLD AUTO: 56.5 % — SIGNIFICANT CHANGE UP (ref 43–77)
NRBC # BLD: 0 /100 WBCS — SIGNIFICANT CHANGE UP (ref 0–0)
NRBC # FLD: 0 K/UL — SIGNIFICANT CHANGE UP (ref 0–0)
PLATELET # BLD AUTO: 227 K/UL — SIGNIFICANT CHANGE UP (ref 150–400)
POTASSIUM SERPL-MCNC: 4.1 MMOL/L — SIGNIFICANT CHANGE UP (ref 3.5–5.3)
POTASSIUM SERPL-SCNC: 4.1 MMOL/L — SIGNIFICANT CHANGE UP (ref 3.5–5.3)
RBC # BLD: 5.3 M/UL — SIGNIFICANT CHANGE UP (ref 4.2–5.8)
RBC # FLD: 13.3 % — SIGNIFICANT CHANGE UP (ref 10.3–14.5)
SODIUM SERPL-SCNC: 137 MMOL/L — SIGNIFICANT CHANGE UP (ref 135–145)
WBC # BLD: 6.65 K/UL — SIGNIFICANT CHANGE UP (ref 3.8–10.5)
WBC # FLD AUTO: 6.65 K/UL — SIGNIFICANT CHANGE UP (ref 3.8–10.5)

## 2022-09-18 PROCEDURE — 99232 SBSQ HOSP IP/OBS MODERATE 35: CPT

## 2022-09-18 RX ADMIN — ATORVASTATIN CALCIUM 20 MILLIGRAM(S): 80 TABLET, FILM COATED ORAL at 21:04

## 2022-09-18 RX ADMIN — TAMSULOSIN HYDROCHLORIDE 0.8 MILLIGRAM(S): 0.4 CAPSULE ORAL at 21:04

## 2022-09-18 RX ADMIN — Medication 81 MILLIGRAM(S): at 11:25

## 2022-09-18 RX ADMIN — Medication 25 MILLIGRAM(S): at 11:30

## 2022-09-18 RX ADMIN — LOSARTAN POTASSIUM 50 MILLIGRAM(S): 100 TABLET, FILM COATED ORAL at 05:03

## 2022-09-18 RX ADMIN — CLOPIDOGREL BISULFATE 75 MILLIGRAM(S): 75 TABLET, FILM COATED ORAL at 11:25

## 2022-09-18 RX ADMIN — Medication 10 MILLIGRAM(S): at 11:25

## 2022-09-18 NOTE — PROGRESS NOTE ADULT - PROBLEM SELECTOR PLAN 1
Pt reports 1 week hx of dizziness, provoked with ambulation and standing but not quite worsened with head movement. Workup for Central vs Peripheral etiology   DDx: stroke, vestibular dysfunction, plus possible cervical myelopathy.  -EKG NSR, HR 97, T wave inversions in lateral leads, unchanged from prior EKG  -orthostatics negative: standing 172/75, sitting/lyin/77  -CTH noncontrast showing slight disproportionate volume loss of the superior cerebellar vermis and superior cerebellar hemispheres bilaterally of unclear clinical significance  - MRI with 1 cm enhancing focus involves the right cerebellar cortex, without associated restricted diffusion or surrounding vasogenic edema.   - CT H&N - showed possible dissection flap of the right internal carotid distal to the takeoff of the right opthalmic artery   [ ] MRA head and neck with T1 fat saturation   - Neurosurgery not recommending any neuro-vascular intervention-> continue with DAPT  - neuro recs, f/u plan for ILR  - continue aspirin indef, length of plavix  - meclizine 25 mg PRN  - PT/OT eval

## 2022-09-19 LAB
ANION GAP SERPL CALC-SCNC: 10 MMOL/L — SIGNIFICANT CHANGE UP (ref 7–14)
BASOPHILS # BLD AUTO: 0.06 K/UL — SIGNIFICANT CHANGE UP (ref 0–0.2)
BASOPHILS NFR BLD AUTO: 0.9 % — SIGNIFICANT CHANGE UP (ref 0–2)
BUN SERPL-MCNC: 17 MG/DL — SIGNIFICANT CHANGE UP (ref 7–23)
CALCIUM SERPL-MCNC: 8.7 MG/DL — SIGNIFICANT CHANGE UP (ref 8.4–10.5)
CHLORIDE SERPL-SCNC: 105 MMOL/L — SIGNIFICANT CHANGE UP (ref 98–107)
CO2 SERPL-SCNC: 24 MMOL/L — SIGNIFICANT CHANGE UP (ref 22–31)
CREAT SERPL-MCNC: 0.82 MG/DL — SIGNIFICANT CHANGE UP (ref 0.5–1.3)
EGFR: 99 ML/MIN/1.73M2 — SIGNIFICANT CHANGE UP
EOSINOPHIL # BLD AUTO: 0.44 K/UL — SIGNIFICANT CHANGE UP (ref 0–0.5)
EOSINOPHIL NFR BLD AUTO: 6.8 % — HIGH (ref 0–6)
GLUCOSE BLDC GLUCOMTR-MCNC: 115 MG/DL — HIGH (ref 70–99)
GLUCOSE BLDC GLUCOMTR-MCNC: 135 MG/DL — HIGH (ref 70–99)
GLUCOSE BLDC GLUCOMTR-MCNC: 155 MG/DL — HIGH (ref 70–99)
GLUCOSE BLDC GLUCOMTR-MCNC: 198 MG/DL — HIGH (ref 70–99)
GLUCOSE SERPL-MCNC: 116 MG/DL — HIGH (ref 70–99)
HCT VFR BLD CALC: 41.6 % — SIGNIFICANT CHANGE UP (ref 39–50)
HGB BLD-MCNC: 13.7 G/DL — SIGNIFICANT CHANGE UP (ref 13–17)
IANC: 3.49 K/UL — SIGNIFICANT CHANGE UP (ref 1.8–7.4)
IMM GRANULOCYTES NFR BLD AUTO: 0.2 % — SIGNIFICANT CHANGE UP (ref 0–0.9)
LYMPHOCYTES # BLD AUTO: 1.84 K/UL — SIGNIFICANT CHANGE UP (ref 1–3.3)
LYMPHOCYTES # BLD AUTO: 28.5 % — SIGNIFICANT CHANGE UP (ref 13–44)
MAGNESIUM SERPL-MCNC: 1.7 MG/DL — SIGNIFICANT CHANGE UP (ref 1.6–2.6)
MCHC RBC-ENTMCNC: 26.6 PG — LOW (ref 27–34)
MCHC RBC-ENTMCNC: 32.9 GM/DL — SIGNIFICANT CHANGE UP (ref 32–36)
MCV RBC AUTO: 80.6 FL — SIGNIFICANT CHANGE UP (ref 80–100)
MONOCYTES # BLD AUTO: 0.61 K/UL — SIGNIFICANT CHANGE UP (ref 0–0.9)
MONOCYTES NFR BLD AUTO: 9.5 % — SIGNIFICANT CHANGE UP (ref 2–14)
NEUTROPHILS # BLD AUTO: 3.49 K/UL — SIGNIFICANT CHANGE UP (ref 1.8–7.4)
NEUTROPHILS NFR BLD AUTO: 54.1 % — SIGNIFICANT CHANGE UP (ref 43–77)
NRBC # BLD: 0 /100 WBCS — SIGNIFICANT CHANGE UP (ref 0–0)
NRBC # FLD: 0 K/UL — SIGNIFICANT CHANGE UP (ref 0–0)
PLATELET # BLD AUTO: 234 K/UL — SIGNIFICANT CHANGE UP (ref 150–400)
POTASSIUM SERPL-MCNC: 4.1 MMOL/L — SIGNIFICANT CHANGE UP (ref 3.5–5.3)
POTASSIUM SERPL-SCNC: 4.1 MMOL/L — SIGNIFICANT CHANGE UP (ref 3.5–5.3)
RBC # BLD: 5.16 M/UL — SIGNIFICANT CHANGE UP (ref 4.2–5.8)
RBC # FLD: 13.2 % — SIGNIFICANT CHANGE UP (ref 10.3–14.5)
SARS-COV-2 RNA SPEC QL NAA+PROBE: SIGNIFICANT CHANGE UP
SODIUM SERPL-SCNC: 139 MMOL/L — SIGNIFICANT CHANGE UP (ref 135–145)
WBC # BLD: 6.45 K/UL — SIGNIFICANT CHANGE UP (ref 3.8–10.5)
WBC # FLD AUTO: 6.45 K/UL — SIGNIFICANT CHANGE UP (ref 3.8–10.5)

## 2022-09-19 PROCEDURE — 99233 SBSQ HOSP IP/OBS HIGH 50: CPT

## 2022-09-19 RX ADMIN — Medication 10 MILLIGRAM(S): at 11:45

## 2022-09-19 RX ADMIN — CLOPIDOGREL BISULFATE 75 MILLIGRAM(S): 75 TABLET, FILM COATED ORAL at 11:11

## 2022-09-19 RX ADMIN — TAMSULOSIN HYDROCHLORIDE 0.8 MILLIGRAM(S): 0.4 CAPSULE ORAL at 21:01

## 2022-09-19 RX ADMIN — LOSARTAN POTASSIUM 50 MILLIGRAM(S): 100 TABLET, FILM COATED ORAL at 05:32

## 2022-09-19 RX ADMIN — Medication 1: at 12:56

## 2022-09-19 RX ADMIN — ATORVASTATIN CALCIUM 20 MILLIGRAM(S): 80 TABLET, FILM COATED ORAL at 21:01

## 2022-09-19 RX ADMIN — Medication 25 MILLIGRAM(S): at 11:10

## 2022-09-19 RX ADMIN — Medication 81 MILLIGRAM(S): at 11:11

## 2022-09-19 NOTE — CHART NOTE - NSCHARTNOTEFT_GEN_A_CORE
Pending MRA H/N w/ T1 Fat Sat suppression. Neurosurgery following, no acute intervention indicated for incidental possible intracranial ICA dissection. No clinical or other imaging correlate for this incidental finding at this time, can be worked up on an outpatient (most likely a chronic finding). Should not hold discharge.    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 Possible, incidental, clinically silent R ICA dissection without evidence of infarction.       Recommendations    -Continue DAPT therapy for 3 months, followed by ASA monotherapy   -MRA head and neck with T1 fat saturation to confirm dissection on outpatient basis   -Outpatient MRA NOVA to better characterize hemodynamics   -Upon discharge, PT should F/U with either Dr. Naranjo or Dr. Ojeda:  [phone] [address] [place] [address]       From neurovascular standpoint, patient is cleared for discharge.     Patient case discussed with stroke attending, Dr. Naranjo.    Please call with questions: [phone]

## 2022-09-19 NOTE — PROGRESS NOTE ADULT - PROBLEM SELECTOR PLAN 1
Pt reports 1 week hx of dizziness, provoked with ambulation and standing but not quite worsened with head movement. Workup for Central vs Peripheral etiology   DDx: stroke, vestibular dysfunction, plus possible cervical myelopathy.  -EKG NSR, HR 97, T wave inversions in lateral leads, unchanged from prior EKG  -orthostatics negative: standing 172/75, sitting/lyin/77  -CTH noncontrast showing slight disproportionate volume loss of the superior cerebellar vermis and superior cerebellar hemispheres bilaterally of unclear clinical significance  - MRI with 1 cm enhancing focus involves the right cerebellar cortex, without associated restricted diffusion or surrounding vasogenic edema.   - CT H&N - showed possible dissection flap of the right internal carotid distal to the takeoff of the right opthalmic artery   - MRA head and neck with T1 fat saturation could be done as an outpatient as per Neurosurgery   -Will clarify with Neuro consult re: MRA    - Neurosurgery not recommending any neuro-vascular intervention-> continue with DAPT  - f/u neuro recs, re:  ILR  - continue aspirin indef, length of plavix  - meclizine 25 mg PRN  - PT/OT eval

## 2022-09-20 LAB
ANION GAP SERPL CALC-SCNC: 10 MMOL/L — SIGNIFICANT CHANGE UP (ref 7–14)
BUN SERPL-MCNC: 16 MG/DL — SIGNIFICANT CHANGE UP (ref 7–23)
CALCIUM SERPL-MCNC: 9 MG/DL — SIGNIFICANT CHANGE UP (ref 8.4–10.5)
CHLORIDE SERPL-SCNC: 104 MMOL/L — SIGNIFICANT CHANGE UP (ref 98–107)
CO2 SERPL-SCNC: 23 MMOL/L — SIGNIFICANT CHANGE UP (ref 22–31)
CREAT SERPL-MCNC: 0.77 MG/DL — SIGNIFICANT CHANGE UP (ref 0.5–1.3)
EGFR: 101 ML/MIN/1.73M2 — SIGNIFICANT CHANGE UP
GLUCOSE BLDC GLUCOMTR-MCNC: 136 MG/DL — HIGH (ref 70–99)
GLUCOSE BLDC GLUCOMTR-MCNC: 137 MG/DL — HIGH (ref 70–99)
GLUCOSE BLDC GLUCOMTR-MCNC: 188 MG/DL — HIGH (ref 70–99)
GLUCOSE BLDC GLUCOMTR-MCNC: 195 MG/DL — HIGH (ref 70–99)
GLUCOSE SERPL-MCNC: 131 MG/DL — HIGH (ref 70–99)
HCT VFR BLD CALC: 43.5 % — SIGNIFICANT CHANGE UP (ref 39–50)
HGB BLD-MCNC: 14.1 G/DL — SIGNIFICANT CHANGE UP (ref 13–17)
MAGNESIUM SERPL-MCNC: 1.8 MG/DL — SIGNIFICANT CHANGE UP (ref 1.6–2.6)
MCHC RBC-ENTMCNC: 26.3 PG — LOW (ref 27–34)
MCHC RBC-ENTMCNC: 32.4 GM/DL — SIGNIFICANT CHANGE UP (ref 32–36)
MCV RBC AUTO: 81 FL — SIGNIFICANT CHANGE UP (ref 80–100)
NRBC # BLD: 0 /100 WBCS — SIGNIFICANT CHANGE UP (ref 0–0)
NRBC # FLD: 0 K/UL — SIGNIFICANT CHANGE UP (ref 0–0)
PHOSPHATE SERPL-MCNC: 3 MG/DL — SIGNIFICANT CHANGE UP (ref 2.5–4.5)
PLATELET # BLD AUTO: 246 K/UL — SIGNIFICANT CHANGE UP (ref 150–400)
POTASSIUM SERPL-MCNC: 4.2 MMOL/L — SIGNIFICANT CHANGE UP (ref 3.5–5.3)
POTASSIUM SERPL-SCNC: 4.2 MMOL/L — SIGNIFICANT CHANGE UP (ref 3.5–5.3)
RBC # BLD: 5.37 M/UL — SIGNIFICANT CHANGE UP (ref 4.2–5.8)
RBC # FLD: 13 % — SIGNIFICANT CHANGE UP (ref 10.3–14.5)
SODIUM SERPL-SCNC: 137 MMOL/L — SIGNIFICANT CHANGE UP (ref 135–145)
WBC # BLD: 6.44 K/UL — SIGNIFICANT CHANGE UP (ref 3.8–10.5)
WBC # FLD AUTO: 6.44 K/UL — SIGNIFICANT CHANGE UP (ref 3.8–10.5)

## 2022-09-20 PROCEDURE — 99233 SBSQ HOSP IP/OBS HIGH 50: CPT

## 2022-09-20 RX ORDER — MAGNESIUM SULFATE 500 MG/ML
1 VIAL (ML) INJECTION ONCE
Refills: 0 | Status: COMPLETED | OUTPATIENT
Start: 2022-09-20 | End: 2022-09-20

## 2022-09-20 RX ORDER — SODIUM CHLORIDE 9 MG/ML
250 INJECTION INTRAMUSCULAR; INTRAVENOUS; SUBCUTANEOUS ONCE
Refills: 0 | Status: COMPLETED | OUTPATIENT
Start: 2022-09-20 | End: 2022-09-20

## 2022-09-20 RX ADMIN — Medication 10 MILLIGRAM(S): at 13:04

## 2022-09-20 RX ADMIN — Medication 100 GRAM(S): at 09:55

## 2022-09-20 RX ADMIN — ATORVASTATIN CALCIUM 20 MILLIGRAM(S): 80 TABLET, FILM COATED ORAL at 23:07

## 2022-09-20 RX ADMIN — Medication 25 MILLIGRAM(S): at 23:11

## 2022-09-20 RX ADMIN — Medication 3 MILLIGRAM(S): at 23:07

## 2022-09-20 RX ADMIN — Medication 25 MILLIGRAM(S): at 06:42

## 2022-09-20 RX ADMIN — TAMSULOSIN HYDROCHLORIDE 0.8 MILLIGRAM(S): 0.4 CAPSULE ORAL at 23:06

## 2022-09-20 RX ADMIN — Medication 81 MILLIGRAM(S): at 13:04

## 2022-09-20 RX ADMIN — LOSARTAN POTASSIUM 50 MILLIGRAM(S): 100 TABLET, FILM COATED ORAL at 06:43

## 2022-09-20 RX ADMIN — CLOPIDOGREL BISULFATE 75 MILLIGRAM(S): 75 TABLET, FILM COATED ORAL at 13:04

## 2022-09-20 RX ADMIN — Medication 1: at 13:09

## 2022-09-20 RX ADMIN — SODIUM CHLORIDE 500 MILLILITER(S): 9 INJECTION INTRAMUSCULAR; INTRAVENOUS; SUBCUTANEOUS at 20:33

## 2022-09-20 NOTE — CONSULT NOTE ADULT - ASSESSMENT
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  He was found to have subacute CVA. Telemetry recorded SR with few SB down to 49  at times (asymptomatic).  This am while he was ambulating tele monitor recorded one episode of WCT recorded as VT which is consistent with artifact.  RRT called patient was asymptomatic and hemodynamically stable.   Patient endorses he has been experiencing vertigo/ dizziness lasting up to 8-10 minutes usually for a long time.    No correlated symptomatic chris or tachyarrhythmia seen.  Echo normal LVEF.    Discussed about potential benefit of implantable loop recorder for PAF detection, patient will discuss with his family before he makes the decision.    -Will follow up for implantable loop recorder  -Continue care per neuro/primary team   
Mr. Ro is a 61 yo man with nonspecific dizziness that he describes as heaviness in the head.    He has facial asymmetry and UMN signs, B.   DDx: stroke, vestibular dysfunction, plus possible cervical myelopathy.   MRI brain/IAC w/wo gado  MRI cervical spine  Consider CDU.     Thank you
61 yo man with 1 week of dizziness - he describes a "heaviness" in the head.  It is worse when he stands up and better when lying down.  Minimal worsening with head movement.  It has been better when he first wakes up and worse when he gets up and is active for a while. CTH with volume loss in cerebellar vermi & hemispheres b/l. MRI brain w/o contrast demonstrates acute infarct of the R cerebellum. MR C spine shows mild degenerative changes & mild cervical canal stenosis., Started on Plavix 9/14, on baby ASA , CTA demonstrated 30% stenosis of ICA and linear hypodensity in the Right ICA just distal to R opthalmic artery that could be a dissection flap

## 2022-09-20 NOTE — PROVIDER CONTACT NOTE (CHANGE IN STATUS NOTIFICATION) - ASSESSMENT
asymptomatic
asymptomatic; denies dizziness, lightheadedness, chest pain, shortness of breath, nausea, vomiting, palpitations, headache at this time

## 2022-09-20 NOTE — CONSULT NOTE ADULT - NS ATTEND AMEND GEN_ALL_CORE FT
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  He was found to have subacute CVA. Telemetry recorded SR , no AF. Patient agreeable for ILR placement.

## 2022-09-20 NOTE — RAPID RESPONSE TEAM SUMMARY - NSSITUATIONBACKGROUNDRRT_GEN_ALL_CORE
63 yo M with pmhx of HTN, HLD, DM on metformin, CAD, BPH, remote hx CVA who comes in with complaints of dizziness.    RRT called for Vtach while walking. Upon arrival, patient no longer in Vtach. Patient asymptomatic, mentating well, vital signs wnl. EKG obtained did not show vtach. As patient was hemodynamically stable, ended RRT. Recommend primary team to repelete K and Mg appropriately.

## 2022-09-20 NOTE — CHART NOTE - NSCHARTNOTEFT_GEN_A_CORE
Vital Signs Last 24 Hrs  T(C): 36.7 (20 Sep 2022 06:35), Max: 36.8 (19 Sep 2022 18:08)  T(F): 98.1 (20 Sep 2022 06:35), Max: 98.3 (19 Sep 2022 18:08)  HR: 57 (20 Sep 2022 06:35) (57 - 81)  BP: 157/82 (20 Sep 2022 06:35) (118/64 - 157/82)  BP(mean): --  RR: 18 (20 Sep 2022 06:35) (17 - 18)  SpO2: 99% (20 Sep 2022 06:35) (99% - 100%)    Parameters below as of 20 Sep 2022 06:35  Patient On (Oxygen Delivery Method): room air                            14.1   6.44  )-----------( 246      ( 20 Sep 2022 06:14 )             43.5   09-20    137  |  104  |  16  ----------------------------<  131<H>  4.2   |  23  |  0.77    Ca    9.0      20 Sep 2022 06:14  Phos  3.0     09-20  Mg     1.80     09-20    This morning, RRT called by RN for Vtach noted on Tele while pt ambulating. Upon assessment, vtach resolved, patient asymptomatic, VSS, EKG noted with NSR. Labs noted. Discussed findings and case with Dr. Calhoun  Discussed with Neuro at bedside - no further intervention, will continue to closely monitor Vital Signs Last 24 Hrs  T(C): 36.7 (20 Sep 2022 06:35), Max: 36.8 (19 Sep 2022 18:08)  T(F): 98.1 (20 Sep 2022 06:35), Max: 98.3 (19 Sep 2022 18:08)  HR: 57 (20 Sep 2022 06:35) (57 - 81)  BP: 157/82 (20 Sep 2022 06:35) (118/64 - 157/82)  BP(mean): --  RR: 18 (20 Sep 2022 06:35) (17 - 18)  SpO2: 99% (20 Sep 2022 06:35) (99% - 100%)    Parameters below as of 20 Sep 2022 06:35  Patient On (Oxygen Delivery Method): room air                            14.1   6.44  )-----------( 246      ( 20 Sep 2022 06:14 )             43.5   09-20    137  |  104  |  16  ----------------------------<  131<H>  4.2   |  23  |  0.77    Ca    9.0      20 Sep 2022 06:14  Phos  3.0     09-20  Mg     1.80     09-20    This morning, RRT called by RN for Vtach noted on Tele while pt ambulating. Upon assessment, vtach resolved, patient asymptomatic, VSS, EKG noted with NSR. Labs noted. Discussed with Neuro at bedside - no further intervention. Discussed findings and case with Dr. Calhoun> EP consult called as discussed for further evaluation and recs ([id number]).

## 2022-09-20 NOTE — PROGRESS NOTE ADULT - PROBLEM SELECTOR PLAN 1
Pt reports 1 week hx of dizziness, provoked with ambulation and standing but not quite worsened with head movement. Workup for Central vs Peripheral etiology   DDx: stroke, vestibular dysfunction, plus possible cervical myelopathy.  -EKG NSR, HR 97, T wave inversions in lateral leads, unchanged from prior EKG  -orthostatics negative: standing 172/75, sitting/lyin/77  -CTH noncontrast showing slight disproportionate volume loss of the superior cerebellar vermis and superior cerebellar hemispheres bilaterally of unclear clinical significance  - MRI with 1 cm enhancing focus involves the right cerebellar cortex, without associated restricted diffusion or surrounding vasogenic edema.   - CT H&N - showed possible dissection flap of the right internal carotid distal to the takeoff of the right opthalmic artery   - MRA head and neck with T1 fat saturation could be done as an outpatient as per Neurosurgery   -Will clarify with Neuro consult re: MRA    - Neurosurgery not recommending any neuro-vascular intervention-> continue with DAPT  - f/u neuro recs, re:  ILR  - continue aspirin indef, length of plavix  - meclizine 25 mg PRN  - Will check Orthostatic vital signs

## 2022-09-20 NOTE — CONSULT NOTE ADULT - SUBJECTIVE AND OBJECTIVE BOX
Mr. Ro is a 63 yo man with 1 week of dizziness - he describes a "heaviness" in the head.  It is worse when he stands up and better when lying down.  Minimal worsening with head movement.  It has been better when he first wakes up and worse when he gets up and is active for a while.  The neuro ROS is otherwise negative.     Lying down - /77  HR 61  Standing - /75  HR 58    General:  Constitutional: Appears stated age, in NAD.     Neurological:  MS: Awake, alert, oriented to person, place, year, and situation. Follows all 1 step and 2 step commands. Answers questions appropriately, fund of knowledge intact.  No aphasia.     Language: Speech is clear, fluent with good repetition & comprehension (able to name objects).    CNs: Pupils 4-->#, B.. VFF. EOMI, he does not maintain end gaze for very long - possible left beat nystagmus on left gaze, no diplopia. V1-3 intact to LT/pinprick, well developed masseter muscles b/l. Decreased movement in the right face sparing the frontalis.  Symmetric palate elevation in midline. Head turning & shoulder shrug intact b/l. Tongue midline, normal movements, no atrophy.    Motor: Normal muscle bulk. Increased tone - may be voluntary. No noticeable tremor or seizure. No pronator drift.  	            Deltoid	Biceps	Triceps	Wrist	Finger ABd	   	R	5	5	5	5	5		5 	  	L	4+	5	5	5	5		5                 (limited by pain)  		H-Flex	H-Ext	H-ABd	H-ADd	K-Flex	K-Ext	D-Flex	P-Flex  	R	4+	5	5	5	5	5	5	5 	   	L	4+	5	5	5	5	5	5	5	   ? effort with HF.   Sensation: Intact to LT/PP b/l throughout.     Reflexes:  	3+ throughout except 2+ in ankles.   B upgoing toes.     Coordination: No dysmetria to FTN/HTS    Gait: He fells much worse standing and wanted to avoid it.     < from: CT Head No Cont (09.09.22 @ 15:08) >  IMPRESSION: No acute intracranial hemorrhage, mass effect, or shift of   the midline structures.    Slight disproportionate volume loss of the superior cerebellar vermis and   superior cerebellar hemispheres bilaterally of unclear clinical   significance.    < end of copied text >    
HPI:  62M with PMHx of HTN, HLD, DM on metformin, CAD w/out stents, BPH, remote CVA? who comes in with complaints of dizziness for a week. 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 He reports that he felt weak and diaphoretic and felt as though he was losing balance. Patient held on to furniture while he experienced symptoms for 2 mins. Patient reports since then, he has repeated bouts of dizziness and weakness (about 3x a day) that are triggered by positional changes that last about ~2 mins in duration. He denies loss of consciousness, vision changes, headaches, nausea/ vomiting. However, pt feels he experiences prodrome prior to dizzy spells and is able brace himself so he does not lose balance. Patient reports that he has maintained a good appetite and has adequate oral intake. Patient also reports R. sided tinnitus that has been ongoing for 3 years. Of note patient endorses a "slight stroke" 15 years ago while in [facility] with residual R lower facial droop and R arm/leg numbness. This has been unchanged from baseline. Dizziness is however new for him.     (09 Sep 2022 20:50)    PAST MEDICAL & SURGICAL HISTORY:  Hypertension, unspecified type      Hypercholesteremia      Diabetes mellitus      CAD (coronary artery disease)      No significant past surgical history        FAMILY HISTORY:  Family history of early CAD    FH: diabetes mellitus (Father, Mother)    FH: hypertension (Father, Mother)      Home Medications:  aspirin 81 mg oral delayed release tablet: 1 tab(s) orally once a day (10 Sep 2022 00:38)  atorvastatin 20 mg oral tablet: 1 tab(s) orally once a day (10 Sep 2022 00:38)  losartan 50 mg oral tablet: 1 tab(s) orally once a day (10 Sep 2022 00:38)  metFORMIN 500 mg oral tablet: 1 tab(s) orally 2 times a day (10 Sep 2022 00:38)  metoprolol succinate 25 mg oral tablet, extended release: 1 tab(s) orally once a day (10 Sep 2022 00:38)  oxybutynin 10 mg/24 hr oral tablet, extended release: 1 tab(s) orally once a day (10 Sep 2022 00:38)  tamsulosin 0.4 mg oral capsule: 1 tab(s) orally 2 times a day (10 Sep 2022 00:38)      MEDICATIONS  (STANDING):  aspirin enteric coated 81 milliGRAM(s) Oral daily  atorvastatin 20 milliGRAM(s) Oral at bedtime  clopidogrel Tablet 75 milliGRAM(s) Oral daily  dextrose 5%. 1000 milliLiter(s) (100 mL/Hr) IV Continuous <Continuous>  dextrose 5%. 1000 milliLiter(s) (50 mL/Hr) IV Continuous <Continuous>  dextrose 50% Injectable 25 Gram(s) IV Push once  dextrose 50% Injectable 12.5 Gram(s) IV Push once  dextrose 50% Injectable 25 Gram(s) IV Push once  glucagon  Injectable 1 milliGRAM(s) IntraMuscular once  influenza   Vaccine 0.5 milliLiter(s) IntraMuscular once  insulin lispro (ADMELOG) corrective regimen sliding scale   SubCutaneous three times a day before meals  insulin lispro (ADMELOG) corrective regimen sliding scale   SubCutaneous at bedtime  losartan 50 milliGRAM(s) Oral daily  oxybutynin XL 10 milliGRAM(s) Oral every 24 hours  tamsulosin 0.8 milliGRAM(s) Oral at bedtime    MEDICATIONS  (PRN):  acetaminophen     Tablet .. 650 milliGRAM(s) Oral every 6 hours PRN Temp greater or equal to 38C (100.4F), Mild Pain (1 - 3)  aluminum hydroxide/magnesium hydroxide/simethicone Suspension 30 milliLiter(s) Oral every 4 hours PRN Dyspepsia  dextrose Oral Gel 15 Gram(s) Oral once PRN Blood Glucose LESS THAN 70 milliGRAM(s)/deciliter  meclizine 25 milliGRAM(s) Oral every 12 hours PRN Dizziness  melatonin 3 milliGRAM(s) Oral at bedtime PRN Insomnia    Vital Signs Last 24 Hrs  T(C): 36.7 (17 Sep 2022 05:12), Max: 36.7 (16 Sep 2022 17:57)  T(F): 98.1 (17 Sep 2022 05:12), Max: 98.1 (16 Sep 2022 17:57)  HR: 80 (17 Sep 2022 06:05) (80 - 86)  BP: 138/77 (17 Sep 2022 06:05) (138/77 - 189/92)  BP(mean): --  RR: 18 (17 Sep 2022 06:05) (16 - 18)  SpO2: 100% (17 Sep 2022 06:05) (98% - 100%)    Parameters below as of 17 Sep 2022 06:05  Patient On (Oxygen Delivery Method): room air        PHYSICAL EXAM:  Awake Alert Oriented x 3 No distress, Speech is clear  PERRL, EOMI, 1 beat L nystagmus Tongue midline, slight right facial asymmetry   Motor:             RUE 5/5        LUE 5/5             RLE 5/5         LLE 5/5  Sensory intac to light touch  No dysmetria  No oden's     LABS:                            14.6   6.97  )-----------( 240      ( 17 Sep 2022 06:39 )             46.5     09-17    134<L>  |  99  |  21  ----------------------------<  167<H>  4.5   |  23  |  0.88    Ca    9.1      17 Sep 2022 06:39  Mg     1.80     09-17        RADIOLOGY:    
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  He was found to have subacute CVA. Telemetry recorded SR with few SB down to 49  at times (asymptomatic).  This am while he was ambulating tele monitor recorded one episode of WCT recorded as VT which is consistent with artifact.  RRT called patient was asymptomatic and hemodynamically stable.   Patient endorses he has been experiencing vertigo/ dizziness lasting up to 8-10 minutes usually for a long time.   Patient denies CP, SOB or palpitations.     No correlated symptomatic chris or tachyarrhythmia seen.  Echo normal LVEF.    EP called for implantable loop recorder eval.             PAST MEDICAL & SURGICAL HISTORY:  Hypertension, unspecified type      Hypercholesteremia      Diabetes mellitus      CAD (coronary artery disease)      No significant past surgical history          MEDICATIONS  (STANDING):  aspirin enteric coated 81 milliGRAM(s) Oral daily  atorvastatin 20 milliGRAM(s) Oral at bedtime  clopidogrel Tablet 75 milliGRAM(s) Oral daily  dextrose 5%. 1000 milliLiter(s) (50 mL/Hr) IV Continuous <Continuous>  dextrose 5%. 1000 milliLiter(s) (100 mL/Hr) IV Continuous <Continuous>  dextrose 50% Injectable 25 Gram(s) IV Push once  dextrose 50% Injectable 12.5 Gram(s) IV Push once  dextrose 50% Injectable 25 Gram(s) IV Push once  glucagon  Injectable 1 milliGRAM(s) IntraMuscular once  influenza   Vaccine 0.5 milliLiter(s) IntraMuscular once  insulin lispro (ADMELOG) corrective regimen sliding scale   SubCutaneous three times a day before meals  insulin lispro (ADMELOG) corrective regimen sliding scale   SubCutaneous at bedtime  losartan 50 milliGRAM(s) Oral daily  oxybutynin XL 10 milliGRAM(s) Oral every 24 hours  tamsulosin 0.8 milliGRAM(s) Oral at bedtime    MEDICATIONS  (PRN):  acetaminophen     Tablet .. 650 milliGRAM(s) Oral every 6 hours PRN Temp greater or equal to 38C (100.4F), Mild Pain (1 - 3)  aluminum hydroxide/magnesium hydroxide/simethicone Suspension 30 milliLiter(s) Oral every 4 hours PRN Dyspepsia  dextrose Oral Gel 15 Gram(s) Oral once PRN Blood Glucose LESS THAN 70 milliGRAM(s)/deciliter  meclizine 25 milliGRAM(s) Oral every 12 hours PRN Dizziness  melatonin 3 milliGRAM(s) Oral at bedtime PRN Insomnia    Allergies    No Known Allergies    Intolerances      FAMILY HISTORY:  Family history of early CAD    FH: diabetes mellitus (Father, Mother)    FH: hypertension (Father, Mother)        SOCIAL HISTORY:  Denies smoking; no   Alcohol  or  Drug abuse               REVIEW OF SYSTEMS:    CONSTITUTIONAL: No fever, weight loss, chills, shakes, or fatigue  EYES: No eye pain, visual disturbances, or discharge  ENMT:  No difficulty hearing, tinnitus, vertigo; No sinus or throat pain  NECK: No pain or stiffness  RESPIRATORY: No cough, wheezing, hemoptysis, or shortness of breath  CARDIOVASCULAR: No chest pain, dyspnea, palpitations, dizziness, syncope, paroxysmal nocturnal dyspnea, orthopnea, or arm or leg swelling  GASTROINTESTINAL: No abdominal  or epigastric pain, nausea, vomiting, hematemesis, diarrhea, constipation, melena or bright red blood.  GENITOURINARY: No dysuria, nocturia, hematuria, or urinary incontinence  NEUROLOGICAL: No headaches, memory loss, slurred speech, limb weakness, loss of strength, numbness, or tremors  +vertigo  SKIN: No itching, burning, rashes, or lesions   LYMPH NODES: No enlarged glands  ENDOCRINE: No heat or cold intolerance, or hair loss  MUSCULOSKELETAL: No joint pain or swelling, muscle, back, or extremity pain  PSYCHIATRIC: No depression, anxiety, or difficulty sleeping  HEME/LYMPH: No easy bruising or bleeding gums  ALLERY AND IMMUNOLOGIC: No hives or rash.      Vital Signs Last 24 Hrs  T(C): 36.7 (20 Sep 2022 06:35), Max: 36.8 (19 Sep 2022 18:08)  T(F): 98.1 (20 Sep 2022 06:35), Max: 98.3 (19 Sep 2022 18:08)  HR: 57 (20 Sep 2022 06:35) (57 - 81)  BP: 157/82 (20 Sep 2022 06:35) (118/64 - 157/82)  BP(mean): --  RR: 18 (20 Sep 2022 06:35) (17 - 18)  SpO2: 99% (20 Sep 2022 06:35) (99% - 100%)    Parameters below as of 20 Sep 2022 06:35  Patient On (Oxygen Delivery Method): room air        PHYSICAL EXAM:    GENERAL: In no apparent distress, well nourished, and hydrated.  HEAD:  Atraumatic, Normocephalic  NECK: Supple . No JVD or carotid bruit or thyroidmegaly.  Carotid pulse is 2+ bilaterally.  PULMONARY: Clear to auscultation and perfusion.  No rales, wheezing, or rhonchi bilaterally.  HEART: Regular rate and rhythm; No murmurs, rubs, or gallops.  ABDOMEN: Soft, Nontender, Nondistended; Bowel sounds present  EXTREMITIES: No clubbing, cyanosis, or edema  NEUROLOGICAL: Alert oriented to person, place and time.  Speech clear.  Skin: Dry intact, no rashes or lesions.          INTERPRETATION OF TELEMETRY:  Sinus rhythm with occasional sinus chris to 50's during sleep; one artifact recorded as VT at 7:17am    EC/20 at 7:29 NSR at 67, QRS 100ms; QT/QTc 386/407 ms; T wave abnormality        LABS:                        14.1   6.44  )-----------( 246      ( 20 Sep 2022 06:14 )             43.5     09-20    137  |  104  |  16  ----------------------------<  131<H>  4.2   |  23  |  0.77    Ca    9.0      20 Sep 2022 06:14  Phos  3.0     09-20  Mg     1.80     09-20                BNP  RADIOLOGY & ADDITIONAL STUDIES:  PREVIOUS DIAGNOSTIC TESTING:      ECHO FINDINGS:    STRESS FINDINGS:    CATHETERIZATION FINDINGS:

## 2022-09-21 PROBLEM — I25.10 ATHEROSCLEROTIC HEART DISEASE OF NATIVE CORONARY ARTERY WITHOUT ANGINA PECTORIS: Chronic | Status: ACTIVE | Noted: 2022-09-10

## 2022-09-21 PROBLEM — E11.9 TYPE 2 DIABETES MELLITUS WITHOUT COMPLICATIONS: Chronic | Status: ACTIVE | Noted: 2022-09-09

## 2022-09-21 LAB
ALBUMIN SERPL ELPH-MCNC: 3.6 G/DL — SIGNIFICANT CHANGE UP (ref 3.3–5)
ALP SERPL-CCNC: 59 U/L — SIGNIFICANT CHANGE UP (ref 40–120)
ALT FLD-CCNC: 16 U/L — SIGNIFICANT CHANGE UP (ref 4–41)
ANION GAP SERPL CALC-SCNC: 10 MMOL/L — SIGNIFICANT CHANGE UP (ref 7–14)
AST SERPL-CCNC: 13 U/L — SIGNIFICANT CHANGE UP (ref 4–40)
BILIRUB SERPL-MCNC: 0.5 MG/DL — SIGNIFICANT CHANGE UP (ref 0.2–1.2)
BUN SERPL-MCNC: 13 MG/DL — SIGNIFICANT CHANGE UP (ref 7–23)
CALCIUM SERPL-MCNC: 8.8 MG/DL — SIGNIFICANT CHANGE UP (ref 8.4–10.5)
CHLORIDE SERPL-SCNC: 106 MMOL/L — SIGNIFICANT CHANGE UP (ref 98–107)
CO2 SERPL-SCNC: 24 MMOL/L — SIGNIFICANT CHANGE UP (ref 22–31)
CREAT SERPL-MCNC: 0.79 MG/DL — SIGNIFICANT CHANGE UP (ref 0.5–1.3)
EGFR: 100 ML/MIN/1.73M2 — SIGNIFICANT CHANGE UP
GLUCOSE BLDC GLUCOMTR-MCNC: 106 MG/DL — HIGH (ref 70–99)
GLUCOSE BLDC GLUCOMTR-MCNC: 182 MG/DL — HIGH (ref 70–99)
GLUCOSE SERPL-MCNC: 119 MG/DL — HIGH (ref 70–99)
HCT VFR BLD CALC: 42.7 % — SIGNIFICANT CHANGE UP (ref 39–50)
HGB BLD-MCNC: 13.6 G/DL — SIGNIFICANT CHANGE UP (ref 13–17)
MAGNESIUM SERPL-MCNC: 1.7 MG/DL — SIGNIFICANT CHANGE UP (ref 1.6–2.6)
MCHC RBC-ENTMCNC: 26.1 PG — LOW (ref 27–34)
MCHC RBC-ENTMCNC: 31.9 GM/DL — LOW (ref 32–36)
MCV RBC AUTO: 82 FL — SIGNIFICANT CHANGE UP (ref 80–100)
NRBC # BLD: 0 /100 WBCS — SIGNIFICANT CHANGE UP (ref 0–0)
NRBC # FLD: 0 K/UL — SIGNIFICANT CHANGE UP (ref 0–0)
PHOSPHATE SERPL-MCNC: 3 MG/DL — SIGNIFICANT CHANGE UP (ref 2.5–4.5)
PLATELET # BLD AUTO: 229 K/UL — SIGNIFICANT CHANGE UP (ref 150–400)
POTASSIUM SERPL-MCNC: 4.1 MMOL/L — SIGNIFICANT CHANGE UP (ref 3.5–5.3)
POTASSIUM SERPL-SCNC: 4.1 MMOL/L — SIGNIFICANT CHANGE UP (ref 3.5–5.3)
PROT SERPL-MCNC: 6.7 G/DL — SIGNIFICANT CHANGE UP (ref 6–8.3)
RBC # BLD: 5.21 M/UL — SIGNIFICANT CHANGE UP (ref 4.2–5.8)
RBC # FLD: 13.2 % — SIGNIFICANT CHANGE UP (ref 10.3–14.5)
SODIUM SERPL-SCNC: 140 MMOL/L — SIGNIFICANT CHANGE UP (ref 135–145)
WBC # BLD: 7.41 K/UL — SIGNIFICANT CHANGE UP (ref 3.8–10.5)
WBC # FLD AUTO: 7.41 K/UL — SIGNIFICANT CHANGE UP (ref 3.8–10.5)

## 2022-09-21 PROCEDURE — 33285 INSJ SUBQ CAR RHYTHM MNTR: CPT

## 2022-09-21 PROCEDURE — 99233 SBSQ HOSP IP/OBS HIGH 50: CPT

## 2022-09-21 RX ORDER — MAGNESIUM OXIDE 400 MG ORAL TABLET 241.3 MG
400 TABLET ORAL
Refills: 0 | Status: COMPLETED | OUTPATIENT
Start: 2022-09-21 | End: 2022-09-22

## 2022-09-21 RX ADMIN — LOSARTAN POTASSIUM 50 MILLIGRAM(S): 100 TABLET, FILM COATED ORAL at 06:12

## 2022-09-21 RX ADMIN — Medication 10 MILLIGRAM(S): at 12:18

## 2022-09-21 RX ADMIN — MAGNESIUM OXIDE 400 MG ORAL TABLET 400 MILLIGRAM(S): 241.3 TABLET ORAL at 17:22

## 2022-09-21 RX ADMIN — Medication 81 MILLIGRAM(S): at 12:17

## 2022-09-21 RX ADMIN — Medication 1: at 12:45

## 2022-09-21 RX ADMIN — ATORVASTATIN CALCIUM 20 MILLIGRAM(S): 80 TABLET, FILM COATED ORAL at 22:09

## 2022-09-21 RX ADMIN — TAMSULOSIN HYDROCHLORIDE 0.8 MILLIGRAM(S): 0.4 CAPSULE ORAL at 22:09

## 2022-09-21 RX ADMIN — CLOPIDOGREL BISULFATE 75 MILLIGRAM(S): 75 TABLET, FILM COATED ORAL at 12:17

## 2022-09-21 NOTE — CHART NOTE - NSCHARTNOTEFT_GEN_A_CORE
[facility] MEDICINE NIGHT COVERAGE    Patient s/p ILR implant. Patient seen and examined at bedside.  Patient denies any complaintsor pain at implant site  Left subclavicular incision site stable. Dressing clean, dry and intact. No hematoma or active bleeding noted.   Patient educated and understands if any of the above symptoms were to arise, to notify RN.  Patient hemodynamically stable. Will continue to monitor closely.    T(C): 37.2 (09-22-22 @ 02:38), Max: 37.2 (09-21-22 @ 22:19)  HR: 65 (09-22-22 @ 02:38) (59 - 76)  BP: 165/81 (09-22-22 @ 02:38) (151/79 - 180/79)  RR: 18 (09-22-22 @ 02:38) (18 - 18)  SpO2: 99% (09-22-22 @ 02:38) (97% - 100%)      Cecille Braga PA-C  Medicine [id number] [facility] MEDICINE NIGHT COVERAGE    Patient s/p ILR implant. Patient seen and examined at bedside.  Patient denies any complaints or pain at implant site  Left subclavicular incision site stable. Dressing clean, dry and intact. No hematoma or active bleeding noted.   Patient educated and understands if any of the above symptoms were to arise, to notify RN.  Patient hemodynamically stable. Will continue to monitor closely.    T(C): 37.2 (09-22-22 @ 02:38), Max: 37.2 (09-21-22 @ 22:19)  HR: 65 (09-22-22 @ 02:38) (59 - 76)  BP: 165/81 (09-22-22 @ 02:38) (151/79 - 180/79)  RR: 18 (09-22-22 @ 02:38) (18 - 18)  SpO2: 99% (09-22-22 @ 02:38) (97% - 100%)      Cecille Braga PA-C  Medicine [phone]

## 2022-09-22 ENCOUNTER — TRANSCRIPTION ENCOUNTER (OUTPATIENT)
Age: 62
End: 2022-09-22

## 2022-09-22 VITALS
DIASTOLIC BLOOD PRESSURE: 72 MMHG | HEART RATE: 98 BPM | RESPIRATION RATE: 18 BRPM | OXYGEN SATURATION: 100 % | SYSTOLIC BLOOD PRESSURE: 150 MMHG | TEMPERATURE: 98 F

## 2022-09-22 PROBLEM — Z00.00 ENCOUNTER FOR PREVENTIVE HEALTH EXAMINATION: Status: ACTIVE | Noted: 2022-09-22

## 2022-09-22 LAB
ALBUMIN SERPL ELPH-MCNC: 4 G/DL — SIGNIFICANT CHANGE UP (ref 3.3–5)
ALP SERPL-CCNC: 67 U/L — SIGNIFICANT CHANGE UP (ref 40–120)
ALT FLD-CCNC: 15 U/L — SIGNIFICANT CHANGE UP (ref 4–41)
ANION GAP SERPL CALC-SCNC: 9 MMOL/L — SIGNIFICANT CHANGE UP (ref 7–14)
AST SERPL-CCNC: 14 U/L — SIGNIFICANT CHANGE UP (ref 4–40)
BILIRUB SERPL-MCNC: 0.5 MG/DL — SIGNIFICANT CHANGE UP (ref 0.2–1.2)
BUN SERPL-MCNC: 14 MG/DL — SIGNIFICANT CHANGE UP (ref 7–23)
CALCIUM SERPL-MCNC: 9.5 MG/DL — SIGNIFICANT CHANGE UP (ref 8.4–10.5)
CHLORIDE SERPL-SCNC: 103 MMOL/L — SIGNIFICANT CHANGE UP (ref 98–107)
CO2 SERPL-SCNC: 26 MMOL/L — SIGNIFICANT CHANGE UP (ref 22–31)
CREAT SERPL-MCNC: 0.81 MG/DL — SIGNIFICANT CHANGE UP (ref 0.5–1.3)
EGFR: 100 ML/MIN/1.73M2 — SIGNIFICANT CHANGE UP
GLUCOSE SERPL-MCNC: 125 MG/DL — HIGH (ref 70–99)
HCT VFR BLD CALC: 44.6 % — SIGNIFICANT CHANGE UP (ref 39–50)
HGB BLD-MCNC: 14.2 G/DL — SIGNIFICANT CHANGE UP (ref 13–17)
MAGNESIUM SERPL-MCNC: 1.7 MG/DL — SIGNIFICANT CHANGE UP (ref 1.6–2.6)
MCHC RBC-ENTMCNC: 26.2 PG — LOW (ref 27–34)
MCHC RBC-ENTMCNC: 31.8 GM/DL — LOW (ref 32–36)
MCV RBC AUTO: 82.1 FL — SIGNIFICANT CHANGE UP (ref 80–100)
NRBC # BLD: 0 /100 WBCS — SIGNIFICANT CHANGE UP (ref 0–0)
NRBC # FLD: 0 K/UL — SIGNIFICANT CHANGE UP (ref 0–0)
PHOSPHATE SERPL-MCNC: 3.4 MG/DL — SIGNIFICANT CHANGE UP (ref 2.5–4.5)
PLATELET # BLD AUTO: 239 K/UL — SIGNIFICANT CHANGE UP (ref 150–400)
POTASSIUM SERPL-MCNC: 4.2 MMOL/L — SIGNIFICANT CHANGE UP (ref 3.5–5.3)
POTASSIUM SERPL-SCNC: 4.2 MMOL/L — SIGNIFICANT CHANGE UP (ref 3.5–5.3)
PROT SERPL-MCNC: 7.4 G/DL — SIGNIFICANT CHANGE UP (ref 6–8.3)
RBC # BLD: 5.43 M/UL — SIGNIFICANT CHANGE UP (ref 4.2–5.8)
RBC # FLD: 13.2 % — SIGNIFICANT CHANGE UP (ref 10.3–14.5)
SODIUM SERPL-SCNC: 138 MMOL/L — SIGNIFICANT CHANGE UP (ref 135–145)
WBC # BLD: 6.96 K/UL — SIGNIFICANT CHANGE UP (ref 3.8–10.5)
WBC # FLD AUTO: 6.96 K/UL — SIGNIFICANT CHANGE UP (ref 3.8–10.5)

## 2022-09-22 PROCEDURE — 99232 SBSQ HOSP IP/OBS MODERATE 35: CPT

## 2022-09-22 PROCEDURE — 99239 HOSP IP/OBS DSCHRG MGMT >30: CPT

## 2022-09-22 RX ORDER — METOPROLOL TARTRATE 50 MG
1 TABLET ORAL
Qty: 0 | Refills: 0 | DISCHARGE

## 2022-09-22 RX ORDER — ATORVASTATIN CALCIUM 80 MG/1
1 TABLET, FILM COATED ORAL
Qty: 0 | Refills: 0 | DISCHARGE

## 2022-09-22 RX ORDER — ATORVASTATIN CALCIUM 80 MG/1
1 TABLET, FILM COATED ORAL
Qty: 30 | Refills: 0
Start: 2022-09-22 | End: 2022-10-21

## 2022-09-22 RX ORDER — LOSARTAN POTASSIUM 100 MG/1
1 TABLET, FILM COATED ORAL
Qty: 0 | Refills: 0 | DISCHARGE

## 2022-09-22 RX ORDER — CLOPIDOGREL BISULFATE 75 MG/1
1 TABLET, FILM COATED ORAL
Qty: 30 | Refills: 0
Start: 2022-09-22 | End: 2022-10-21

## 2022-09-22 RX ORDER — TAMSULOSIN HYDROCHLORIDE 0.4 MG/1
1 CAPSULE ORAL
Qty: 0 | Refills: 0 | DISCHARGE

## 2022-09-22 RX ORDER — ASPIRIN/CALCIUM CARB/MAGNESIUM 324 MG
1 TABLET ORAL
Qty: 30 | Refills: 0
Start: 2022-09-22 | End: 2022-10-21

## 2022-09-22 RX ORDER — TAMSULOSIN HYDROCHLORIDE 0.4 MG/1
2 CAPSULE ORAL
Qty: 60 | Refills: 0
Start: 2022-09-22 | End: 2022-10-21

## 2022-09-22 RX ORDER — MECLIZINE HCL 12.5 MG
1 TABLET ORAL
Qty: 28 | Refills: 0
Start: 2022-09-22 | End: 2022-10-05

## 2022-09-22 RX ORDER — MAGNESIUM OXIDE 400 MG ORAL TABLET 241.3 MG
400 TABLET ORAL ONCE
Refills: 0 | Status: COMPLETED | OUTPATIENT
Start: 2022-09-22 | End: 2022-09-22

## 2022-09-22 RX ADMIN — MAGNESIUM OXIDE 400 MG ORAL TABLET 400 MILLIGRAM(S): 241.3 TABLET ORAL at 12:42

## 2022-09-22 RX ADMIN — MAGNESIUM OXIDE 400 MG ORAL TABLET 400 MILLIGRAM(S): 241.3 TABLET ORAL at 09:38

## 2022-09-22 RX ADMIN — Medication 10 MILLIGRAM(S): at 12:43

## 2022-09-22 RX ADMIN — CLOPIDOGREL BISULFATE 75 MILLIGRAM(S): 75 TABLET, FILM COATED ORAL at 12:42

## 2022-09-22 RX ADMIN — LOSARTAN POTASSIUM 50 MILLIGRAM(S): 100 TABLET, FILM COATED ORAL at 06:21

## 2022-09-22 RX ADMIN — Medication 81 MILLIGRAM(S): at 12:42

## 2022-09-22 NOTE — PROGRESS NOTE ADULT - PROBLEM SELECTOR PROBLEM 5
HLD (hyperlipidemia)
Abnormal EKG
HLD (hyperlipidemia)

## 2022-09-22 NOTE — PROGRESS NOTE ADULT - PROBLEM SELECTOR PROBLEM 3
Hypertension, unspecified type
Diabetes mellitus
Hypertension, unspecified type

## 2022-09-22 NOTE — PROGRESS NOTE ADULT - PROVIDER SPECIALTY LIST ADULT
Hospitalist
Electrophysiology
Hospitalist
Electrophysiology
Neurology
Hospitalist

## 2022-09-22 NOTE — PROGRESS NOTE ADULT - PROBLEM SELECTOR PLAN 1
Pt reports 1 week hx of dizziness, provoked with ambulation and standing but not quite worsened with head movement. Workup for Central vs Peripheral etiology   DDx: stroke, vestibular dysfunction, plus possible cervical myelopathy.  -EKG NSR, HR 97, T wave inversions in lateral leads, unchanged from prior EKG  -orthostatics negative: standing 172/75, sitting/lyin/77  -CTH noncontrast showing slight disproportionate volume loss of the superior cerebellar vermis and superior cerebellar hemispheres bilaterally of unclear clinical significance  - MRI with 1 cm enhancing focus involves the right cerebellar cortex, without associated restricted diffusion or surrounding vasogenic edema.   - CT H&N - showed possible dissection flap of the right internal carotid distal to the takeoff of the right opthalmic artery   - MRA head and neck with T1 fat saturation could be done as an outpatient as per Neurosurgery   -Will clarify with Neuro consult re: MRA    - Neurosurgery not recommending any neuro-vascular intervention-> continue with DAPT  - S/P ILR  - continue aspirin indef, length of plavix  - meclizine 25 mg PRN  - Will check Orthostatic vital signs---> no significant orthostasis as per BP and HR.  -medically stable for discharge  -F/U with Neurology and Neurosurgery as an outpatient

## 2022-09-22 NOTE — PROGRESS NOTE ADULT - SUBJECTIVE AND OBJECTIVE BOX
[facility] Division of Hospital Medicine  Sonia Rivers MD  Available via MS Teams  Pager: [phone]    SUBJECTIVE / OVERNIGHT EVENTS:    Still has 1-2 episodes when he gets up, lasting for about 30 seconds.   No other complaints.     REVIEW OF SYSTEMS:    CONSTITUTIONAL: No weakness, fevers or chills  EYES/ENT: No visual changes;  No vertigo or throat pain   NECK: No pain or stiffness  RESPIRATORY: No cough, wheezing, hemoptysis; No shortness of breath  CARDIOVASCULAR: No chest pain or palpitations  GASTROINTESTINAL: No abdominal or epigastric pain. No nausea, vomiting, or hematemesis; No diarrhea or constipation. No melena or hematochezia.  GENITOURINARY: No dysuria, frequency or hematuria  NEUROLOGICAL: No numbness or weakness  SKIN: No itching, rashes    MEDICATIONS  (STANDING):  aspirin enteric coated 81 milliGRAM(s) Oral daily  atorvastatin 20 milliGRAM(s) Oral at bedtime  clopidogrel Tablet 75 milliGRAM(s) Oral daily  dextrose 5%. 1000 milliLiter(s) (100 mL/Hr) IV Continuous <Continuous>  dextrose 5%. 1000 milliLiter(s) (50 mL/Hr) IV Continuous <Continuous>  dextrose 50% Injectable 25 Gram(s) IV Push once  dextrose 50% Injectable 12.5 Gram(s) IV Push once  dextrose 50% Injectable 25 Gram(s) IV Push once  glucagon  Injectable 1 milliGRAM(s) IntraMuscular once  influenza   Vaccine 0.5 milliLiter(s) IntraMuscular once  insulin lispro (ADMELOG) corrective regimen sliding scale   SubCutaneous three times a day before meals  insulin lispro (ADMELOG) corrective regimen sliding scale   SubCutaneous at bedtime  losartan 50 milliGRAM(s) Oral daily  oxybutynin XL 10 milliGRAM(s) Oral every 24 hours  tamsulosin 0.8 milliGRAM(s) Oral at bedtime    MEDICATIONS  (PRN):  acetaminophen     Tablet .. 650 milliGRAM(s) Oral every 6 hours PRN Temp greater or equal to 38C (100.4F), Mild Pain (1 - 3)  aluminum hydroxide/magnesium hydroxide/simethicone Suspension 30 milliLiter(s) Oral every 4 hours PRN Dyspepsia  dextrose Oral Gel 15 Gram(s) Oral once PRN Blood Glucose LESS THAN 70 milliGRAM(s)/deciliter  meclizine 25 milliGRAM(s) Oral every 12 hours PRN Dizziness  melatonin 3 milliGRAM(s) Oral at bedtime PRN Insomnia      I&O's Summary    15 Sep 2022 07:01  -  16 Sep 2022 07:00  --------------------------------------------------------  IN: 0 mL / OUT: 2050 mL / NET: -2050 mL    16 Sep 2022 07:01  -  16 Sep 2022 14:51  --------------------------------------------------------  IN: 0 mL / OUT: 1100 mL / NET: -1100 mL        PHYSICAL EXAM:  Vital Signs Last 24 Hrs  T(C): 37.1 (16 Sep 2022 11:21), Max: 37.1 (16 Sep 2022 11:21)  T(F): 98.7 (16 Sep 2022 11:21), Max: 98.7 (16 Sep 2022 11:21)  HR: 77 (16 Sep 2022 11:21) (71 - 89)  BP: 126/74 (16 Sep 2022 11:21) (126/74 - 154/80)  BP(mean): --  RR: 16 (16 Sep 2022 11:21) (16 - 18)  SpO2: 98% (16 Sep 2022 11:21) (98% - 100%)    Parameters below as of 16 Sep 2022 11:21  Patient On (Oxygen Delivery Method): room air      CONSTITUTIONAL: NAD, well-developed  EYES:  conjunctiva and sclera clear  NECK: Supple  RESPIRATORY: Normal respiratory effort; lungs are clear to auscultation bilaterally  CARDIOVASCULAR: Regular rate and rhythm, normal S1 and S2, no murmur/rub/gallop  ABDOMEN: Nontender to palpation, normoactive bowel sounds, no rebound/guarding; No hepatosplenomegaly  MUSCULOSKELETAL:  No lower extremity edema; Peripheral pulses are 2+ bilaterally  PSYCH: A+O to person, place, and time; affect appropriate  NEUROLOGY: no gross motor or sensory deficits   SKIN: No rashes; no palpable lesions    LABS:                        14.6   7.02  )-----------( 246      ( 16 Sep 2022 05:52 )             44.0     09-16    135  |  104  |  20  ----------------------------<  107<H>  4.1   |  22  |  0.75    Ca    9.1      16 Sep 2022 05:52  Phos  3.1     09-15  Mg     1.80     09-16                COVID-19 PCR: NotDetec (16 Sep 2022 06:00)  COVID-19 PCR: NotDetec (09 Sep 2022 15:55)      RADIOLOGY & ADDITIONAL TESTS:  New Results Reviewed Today:   New Imaging Personally Reviewed Today:  New Electrocardiogram Personally Reviewed Today:  Prior or Outpatient Records Reviewed Today:    COMMUNICATION:  Care Discussed with Consultants/Other Providers and Details of Discussion:  Discussions with Patient/Family:  PCP Communication:    
Patient is a 62y old  Male who presents with a chief complaint of Dizziness x1 week (18 Sep 2022 10:40)      SUBJECTIVE / OVERNIGHT EVENTS: Pt seen and examined and     MEDICATIONS  (STANDING):  aspirin enteric coated 81 milliGRAM(s) Oral daily  atorvastatin 20 milliGRAM(s) Oral at bedtime  clopidogrel Tablet 75 milliGRAM(s) Oral daily  dextrose 5%. 1000 milliLiter(s) (100 mL/Hr) IV Continuous <Continuous>  dextrose 5%. 1000 milliLiter(s) (50 mL/Hr) IV Continuous <Continuous>  dextrose 50% Injectable 25 Gram(s) IV Push once  dextrose 50% Injectable 12.5 Gram(s) IV Push once  dextrose 50% Injectable 25 Gram(s) IV Push once  glucagon  Injectable 1 milliGRAM(s) IntraMuscular once  influenza   Vaccine 0.5 milliLiter(s) IntraMuscular once  insulin lispro (ADMELOG) corrective regimen sliding scale   SubCutaneous three times a day before meals  insulin lispro (ADMELOG) corrective regimen sliding scale   SubCutaneous at bedtime  losartan 50 milliGRAM(s) Oral daily  oxybutynin XL 10 milliGRAM(s) Oral every 24 hours  tamsulosin 0.8 milliGRAM(s) Oral at bedtime    MEDICATIONS  (PRN):  acetaminophen     Tablet .. 650 milliGRAM(s) Oral every 6 hours PRN Temp greater or equal to 38C (100.4F), Mild Pain (1 - 3)  aluminum hydroxide/magnesium hydroxide/simethicone Suspension 30 milliLiter(s) Oral every 4 hours PRN Dyspepsia  dextrose Oral Gel 15 Gram(s) Oral once PRN Blood Glucose LESS THAN 70 milliGRAM(s)/deciliter  meclizine 25 milliGRAM(s) Oral every 12 hours PRN Dizziness  melatonin 3 milliGRAM(s) Oral at bedtime PRN Insomnia      Vital Signs Last 24 Hrs  T(C): 36.7 (19 Sep 2022 05:35), Max: 36.7 (18 Sep 2022 11:32)  T(F): 98 (19 Sep 2022 05:35), Max: 98.1 (18 Sep 2022 18:18)  HR: 65 (19 Sep 2022 05:35) (65 - 84)  BP: 149/83 (19 Sep 2022 05:35) (138/81 - 149/83)  BP(mean): --  RR: 17 (19 Sep 2022 05:35) (16 - 18)  SpO2: 98% (19 Sep 2022 05:35) (98% - 100%)    Parameters below as of 19 Sep 2022 05:35  Patient On (Oxygen Delivery Method): room air      CAPILLARY BLOOD GLUCOSE      POCT Blood Glucose.: 135 mg/dL (19 Sep 2022 08:26)  POCT Blood Glucose.: 219 mg/dL (18 Sep 2022 21:17)  POCT Blood Glucose.: 97 mg/dL (18 Sep 2022 18:05)  POCT Blood Glucose.: 135 mg/dL (18 Sep 2022 12:52)    I&O's Summary    18 Sep 2022 07:01  -  19 Sep 2022 07:00  --------------------------------------------------------  IN: 0 mL / OUT: 2650 mL / NET: -2650 mL        PHYSICAL EXAM:  GENERAL: NAD, well-developed  HEAD:  Atraumatic, Normocephalic  EYES: EOMI, PERRLA, conjunctiva and sclera clear  NECK: Supple, No JVD  CHEST/LUNG: Clear to auscultation bilaterally; No wheeze  HEART: Regular rate and rhythm; No murmurs, rubs, or gallops  ABDOMEN: Soft, Nontender, Nondistended; Bowel sounds present  EXTREMITIES:  2+ Peripheral Pulses, No clubbing, cyanosis, or edema  PSYCH: AAOx3  NEUROLOGY: non-focal  SKIN: No rashes or lesions    LABS:                        13.7   6.45  )-----------( 234      ( 19 Sep 2022 05:30 )             41.6     09-19    139  |  105  |  17  ----------------------------<  116<H>  4.1   |  24  |  0.82    Ca    8.7      19 Sep 2022 05:30  Mg     1.70     09-19                RADIOLOGY & ADDITIONAL TESTS:    Imaging Personally Reviewed:    Consultant(s) Notes Reviewed:  Neuro, Neurosurgery     Care Discussed with Consultants/Other Providers:
Patient is seen and examined. He denies any chest pain, SOB, palpitations or dizziness. S/P ILR placement    PAST MEDICAL & SURGICAL HISTORY:  Hypertension, unspecified type    Other specified diabetes mellitus with complication, without long-term current use of insulin    Hypercholesteremia    Diabetes mellitus    Diabetes mellitus    CAD (coronary artery disease)    No significant past surgical history        MEDICATIONS  (STANDING):  aspirin enteric coated 81 milliGRAM(s) Oral daily  atorvastatin 20 milliGRAM(s) Oral at bedtime  clopidogrel Tablet 75 milliGRAM(s) Oral daily  dextrose 5%. 1000 milliLiter(s) (100 mL/Hr) IV Continuous <Continuous>  dextrose 5%. 1000 milliLiter(s) (50 mL/Hr) IV Continuous <Continuous>  dextrose 50% Injectable 25 Gram(s) IV Push once  dextrose 50% Injectable 12.5 Gram(s) IV Push once  dextrose 50% Injectable 25 Gram(s) IV Push once  glucagon  Injectable 1 milliGRAM(s) IntraMuscular once  influenza   Vaccine 0.5 milliLiter(s) IntraMuscular once  insulin lispro (ADMELOG) corrective regimen sliding scale   SubCutaneous three times a day before meals  insulin lispro (ADMELOG) corrective regimen sliding scale   SubCutaneous at bedtime  losartan 50 milliGRAM(s) Oral daily  oxybutynin XL 10 milliGRAM(s) Oral every 24 hours  tamsulosin 0.8 milliGRAM(s) Oral at bedtime    MEDICATIONS  (PRN):  acetaminophen     Tablet .. 650 milliGRAM(s) Oral every 6 hours PRN Temp greater or equal to 38C (100.4F), Mild Pain (1 - 3)  aluminum hydroxide/magnesium hydroxide/simethicone Suspension 30 milliLiter(s) Oral every 4 hours PRN Dyspepsia  dextrose Oral Gel 15 Gram(s) Oral once PRN Blood Glucose LESS THAN 70 milliGRAM(s)/deciliter  meclizine 25 milliGRAM(s) Oral every 12 hours PRN Dizziness  melatonin 3 milliGRAM(s) Oral at bedtime PRN Insomnia    Vital Signs Last 24 Hrs  T(C): 36.6 (22 Sep 2022 06:09), Max: 37.2 (21 Sep 2022 22:19)  T(F): 97.9 (22 Sep 2022 06:09), Max: 98.9 (21 Sep 2022 22:19)  HR: 69 (22 Sep 2022 06:09) (62 - 76)  BP: 156/85 (22 Sep 2022 06:09) (155/75 - 180/79)  BP(mean): 103 (22 Sep 2022 02:38) (101 - 103)  RR: 18 (22 Sep 2022 06:09) (18 - 18)  SpO2: 99% (22 Sep 2022 06:09) (99% - 100%)    Parameters below as of 22 Sep 2022 06:09  Patient On (Oxygen Delivery Method): room air    INTERPRETATION OF TELEMETRY: Sinus rhythm with HR 60s-90s    LABS:                        14.2   6.96  )-----------( 239      ( 22 Sep 2022 06:15 )             44.6     09-22    138  |  103  |  14  ----------------------------<  125<H>  4.2   |  26  |  0.81    Ca    9.5      22 Sep 2022 06:15  Phos  3.4     09-22  Mg     1.70     09-22    TPro  7.4  /  Alb  4.0  /  TBili  0.5  /  DBili  x   /  AST  14  /  ALT  15  /  AlkPhos  67  09-22    I&O's Summary    21 Sep 2022 07:01  -  22 Sep 2022 07:00  --------------------------------------------------------  IN: 1000 mL / OUT: 1600 mL / NET: -600 mL      PHYSICAL EXAM:    GENERAL: In no apparent distress, well nourished, and hydrated.  HEART: Regular rate and rhythm; No murmurs, rubs, or gallops.  PULMONARY: Clear to auscultation and percussion.  No rales, wheezing, or rhonchi bilaterally.  ABDOMEN: Soft, Nontender, Nondistended; Bowel sounds present  EXTREMITIES:  2+ Peripheral Pulses, No clubbing, cyanosis, or edema          
INTERVAL HISTORY: Patient seen at bedside by stroke team & attending. No acute events overnight. Patient reports he feels dizzy when standing up, but does not fall to one side or the other. Discussed MRI findings and management moving forward.    PAST MEDICAL & SURGICAL HISTORY:  Hypertension, unspecified type      Hypercholesteremia      Diabetes mellitus      CAD (coronary artery disease)      No significant past surgical history        FAMILY HISTORY:  Family history of early CAD    FH: diabetes mellitus (Father, Mother)    FH: hypertension (Father, Mother)      SOCIAL HISTORY:   T/E/D:   Occupation:   Lives with:     MEDICATIONS (HOME):  Home Medications:  aspirin 81 mg oral delayed release tablet: 1 tab(s) orally once a day (10 Sep 2022 00:38)  atorvastatin 20 mg oral tablet: 1 tab(s) orally once a day (10 Sep 2022 00:38)  losartan 50 mg oral tablet: 1 tab(s) orally once a day (10 Sep 2022 00:38)  metFORMIN 500 mg oral tablet: 1 tab(s) orally 2 times a day (10 Sep 2022 00:38)  metoprolol succinate 25 mg oral tablet, extended release: 1 tab(s) orally once a day (10 Sep 2022 00:38)  oxybutynin 10 mg/24 hr oral tablet, extended release: 1 tab(s) orally once a day (10 Sep 2022 00:38)  tamsulosin 0.4 mg oral capsule: 1 tab(s) orally 2 times a day (10 Sep 2022 00:38)    MEDICATIONS  (STANDING):  aspirin enteric coated 81 milliGRAM(s) Oral daily  atorvastatin 20 milliGRAM(s) Oral at bedtime  clopidogrel Tablet 75 milliGRAM(s) Oral daily  dextrose 5%. 1000 milliLiter(s) (100 mL/Hr) IV Continuous <Continuous>  dextrose 5%. 1000 milliLiter(s) (50 mL/Hr) IV Continuous <Continuous>  dextrose 50% Injectable 25 Gram(s) IV Push once  dextrose 50% Injectable 12.5 Gram(s) IV Push once  dextrose 50% Injectable 25 Gram(s) IV Push once  glucagon  Injectable 1 milliGRAM(s) IntraMuscular once  influenza   Vaccine 0.5 milliLiter(s) IntraMuscular once  insulin lispro (ADMELOG) corrective regimen sliding scale   SubCutaneous three times a day before meals  insulin lispro (ADMELOG) corrective regimen sliding scale   SubCutaneous at bedtime  losartan 50 milliGRAM(s) Oral daily  oxybutynin XL 10 milliGRAM(s) Oral every 24 hours  tamsulosin 0.8 milliGRAM(s) Oral at bedtime    MEDICATIONS  (PRN):  acetaminophen     Tablet .. 650 milliGRAM(s) Oral every 6 hours PRN Temp greater or equal to 38C (100.4F), Mild Pain (1 - 3)  aluminum hydroxide/magnesium hydroxide/simethicone Suspension 30 milliLiter(s) Oral every 4 hours PRN Dyspepsia  dextrose Oral Gel 15 Gram(s) Oral once PRN Blood Glucose LESS THAN 70 milliGRAM(s)/deciliter  meclizine 25 milliGRAM(s) Oral every 12 hours PRN Dizziness  melatonin 3 milliGRAM(s) Oral at bedtime PRN Insomnia    ALLERGIES/INTOLERANCES:  Allergies  No Known Allergies    Intolerances    VITALS & EXAMINATION:  Vital Signs Last 24 Hrs  T(C): 36.5 (15 Sep 2022 05:17), Max: 36.7 (14 Sep 2022 17:07)  T(F): 97.7 (15 Sep 2022 05:17), Max: 98 (14 Sep 2022 17:07)  HR: 74 (15 Sep 2022 06:47) (60 - 86)  BP: 152/72 (15 Sep 2022 06:47) (127/68 - 182/74)  BP(mean): --  RR: 16 (15 Sep 2022 05:17) (16 - 18)  SpO2: 99% (15 Sep 2022 05:17) (99% - 100%)    Parameters below as of 15 Sep 2022 05:17  Patient On (Oxygen Delivery Method): room air      General:  Constitutional: Male, appears stated age, in no apparent distress including pain  Head: Normocephalic & Atraumatic.  Respiratory: Breathing comfortably.    Neurological:  MS: Eyes open. Awake, alert, oriented to person, place, situation, time. Follows all commands.    Language: Speech is clear, fluent with good repetition & comprehension.    CNs: VFF. EOMI, questionable 1 beat of nystagmus to the R on R gaze. V1-3 intact to LT b/l. Mild R facial palsy.     Motor: Normal muscle bulk & tone. No noticeable tremor. No drifts in UE or LE b/l.    Sensation: Intact to LT b/l throughout.     Cortical: Extinction on DSS (neglect): none    Coordination: No dysmetria on FTN b/l.     Gait: Deferred.         LABORATORY:  CBC                       13.9   6.41  )-----------( 232      ( 15 Sep 2022 07:29 )             44.2     Chem 09-15    142  |  106  |  14  ----------------------------<  114<H>  4.1   |  23  |  0.72    Ca    9.0      15 Sep 2022 07:29  Phos  3.1     09-15  Mg     1.70     09-15      LFTs   Coagulopathy   Lipid Panel 09-10 Chol 112 LDL -- HDL 41 Trig 53  A1c   Cardiac enzymes     U/A   CSF  Immunological  Other    STUDIES & IMAGING:  Studies (EKG, EEG, EMG, etc):     Radiology (XR, CT, MR, U/S, TTE/BAO):    MR C-Spine:    A 1 cm enhancing lesion is noted involving the right cerebellum. Please   see separate brain MRI report.    ALIGNMENT: Cervical lordosis without evidence of traumatic malalignment.   No listhesis defect.    VERTEBRAE: The vertebral bodies are normal in height. There is no   fracture or aggressive osseous lesion. T4 1.3 cm hemangioma with   associated increased T1 and T2 signal.    DISCS: Disc desiccation changes are noted throughout the cervical spine.    CORD: There is no intrinsic spinal cord signal abnormality. No cord   malformation nor compression.    PARAVERTEBRAL SOFT TISSUES: The visualized paravertebral soft tissues   appear within normal limits.    EVALUATION OF INDIVIDUAL LEVELS:  C2-3: No disc herniation, spinal canal or neuroforaminal stenosis.    C3-4: Mild broad-based disc bulge: No central spinal canal stenosis. Mild   right neural foraminal stenosis secondary to facet hypertrophy.    C4-5: Mild focal central disc bulge: No central canal stenosis. Mild to   moderate right spinal canal stenosis secondary to facet hypertrophy.    C5-6: A small disc bulge is noted with mild central canal stenosis.    C6-7: No disc herniation, spinal canal or neuroforaminal stenosis.    C7-T1: No disc herniation, spinal canal or neuroforaminal stenosis.    --------------------------------------------    IMPRESSION:    MRI BRAIN AND IAC WITHOUT AND WITH IV CONTRAST:    A 1 cm enhancing focus involves the right cerebellar cortex, without   associated restricted diffusion or surrounding vasogenic edema. This may   reflect an evolving subacute infarct with associated enhancement. Serial   imaging follow-up over time is recommended to exclude other etiologies   (such as metastatic disease, small primary tumor, infection, or   inflammation).    MRI CERVICAL SPINE WITHOUT AND WITH IV CONTRAST:    Mild degenerative changes involve the cervical spine as described, with   associated mild central canal stenosis.    --- End of Report ---      TTE:     Ejection Fraction (Bey Rule): 60 %  ------------------------------------------------------------------------  OBSERVATIONS:  Mitral Valve: Mitral annular calcification, otherwise  normal mitral valve. Minimal mitral regurgitation.  Aortic Root: Normal aortic root.  Aortic Valve: Calcified trileaflet aortic valve with normal  opening.  Left Atrium: Mildly dilated left atrium.  LA volume index =  38 cc/m2.  Left Ventricle: Normal leftventricular systolic function.  No segmental wall motion abnormalities. Normal left  ventricular internal dimensions and wall thicknesses. Mild  diastolic dysfunction (Stage I).  Right Heart: Normal right atrium. Normal right ventricular  size and function. Normal tricuspid valve. Minimal  tricuspid regurgitation. Normal pulmonic valve. Minimal  pulmonic regurgitation.  Pericardium/PleuraNormal pericardium with no pericardial  effusion.  ------------------------------------------------------------------------  CONCLUSIONS:  1. Mitral annular calcification, otherwise normal mitral  valve. Minimal mitral regurgitation.  2. Mildly dilated left atrium.  LA volume index = 38 cc/m2.  3. Normal left ventricular internal dimensions and wall  thicknesses.  4. Normal left ventricular systolic function. No segmental  wall motion abnormalities.  5. Mild diastolic dysfunction (Stage I).  6. Normal right ventricular size and function.  7. A bubble study was performed with the intravenous  injection of agitated saline contrast and was inconclusive  regarding the presence of an interatrial shunt.  ------------------------------------------------------------------------  Confirmed on  9/12/2022 - 12:45:00 by Nathaniel Monique M.D.,  [facility]  ------------------------------------------------------------------------      
Patient is seen and examined. He is resting in bed and denies any chest pain, SOB, palpitations or dizziness.   PAST MEDICAL & SURGICAL HISTORY:  Hypertension, unspecified type    Other specified diabetes mellitus with complication, without long-term current use of insulin    Hypercholesteremia    Diabetes mellitus    Diabetes mellitus    CAD (coronary artery disease)    No significant past surgical history        MEDICATIONS  (STANDING):  aspirin enteric coated 81 milliGRAM(s) Oral daily  atorvastatin 20 milliGRAM(s) Oral at bedtime  clopidogrel Tablet 75 milliGRAM(s) Oral daily  dextrose 5%. 1000 milliLiter(s) (50 mL/Hr) IV Continuous <Continuous>  dextrose 5%. 1000 milliLiter(s) (100 mL/Hr) IV Continuous <Continuous>  dextrose 50% Injectable 25 Gram(s) IV Push once  dextrose 50% Injectable 12.5 Gram(s) IV Push once  dextrose 50% Injectable 25 Gram(s) IV Push once  glucagon  Injectable 1 milliGRAM(s) IntraMuscular once  influenza   Vaccine 0.5 milliLiter(s) IntraMuscular once  insulin lispro (ADMELOG) corrective regimen sliding scale   SubCutaneous three times a day before meals  insulin lispro (ADMELOG) corrective regimen sliding scale   SubCutaneous at bedtime  losartan 50 milliGRAM(s) Oral daily  oxybutynin XL 10 milliGRAM(s) Oral every 24 hours  tamsulosin 0.8 milliGRAM(s) Oral at bedtime    MEDICATIONS  (PRN):  acetaminophen     Tablet .. 650 milliGRAM(s) Oral every 6 hours PRN Temp greater or equal to 38C (100.4F), Mild Pain (1 - 3)  aluminum hydroxide/magnesium hydroxide/simethicone Suspension 30 milliLiter(s) Oral every 4 hours PRN Dyspepsia  dextrose Oral Gel 15 Gram(s) Oral once PRN Blood Glucose LESS THAN 70 milliGRAM(s)/deciliter  meclizine 25 milliGRAM(s) Oral every 12 hours PRN Dizziness  melatonin 3 milliGRAM(s) Oral at bedtime PRN Insomnia      Vital Signs Last 24 Hrs  T(C): 36.7 (21 Sep 2022 05:49), Max: 36.9 (20 Sep 2022 21:25)  T(F): 98.1 (21 Sep 2022 05:49), Max: 98.4 (20 Sep 2022 21:25)  HR: 59 (21 Sep 2022 05:49) (59 - 72)  BP: 160/78 (21 Sep 2022 05:49) (144/79 - 178/78)  BP(mean): --  RR: 18 (21 Sep 2022 05:49) (18 - 18)  SpO2: 97% (21 Sep 2022 05:49) (97% - 100%)    Parameters below as of 21 Sep 2022 05:49  Patient On (Oxygen Delivery Method): room air    INTERPRETATION OF TELEMETRY: Sinus rhythm with HR 60s-80s    LABS:                        13.6   7.41  )-----------( 229      ( 21 Sep 2022 06:04 )             42.7     09-21    140  |  106  |  13  ----------------------------<  119<H>  4.1   |  24  |  0.79    Ca    8.8      21 Sep 2022 06:04  Phos  3.0     09-21  Mg     1.70     09-21    TPro  6.7  /  Alb  3.6  /  TBili  0.5  /  DBili  x   /  AST  13  /  ALT  16  /  AlkPhos  59  09-21      I&O's Summary    20 Sep 2022 07:01  -  21 Sep 2022 07:00  --------------------------------------------------------  IN: 1000 mL / OUT: 1925 mL / NET: -925 mL    21 Sep 2022 07:01  -  21 Sep 2022 09:28  --------------------------------------------------------  IN: 0 mL / OUT: 400 mL / NET: -400 mL      PHYSICAL EXAM:    GENERAL: In no apparent distress, well nourished, and hydrated.  HEART: Regular rate and rhythm; No murmurs, rubs, or gallops.  PULMONARY: Clear to auscultation and percussion.  No rales, wheezing, or rhonchi bilaterally.  ABDOMEN: Soft, Nontender, Nondistended; Bowel sounds present  EXTREMITIES:  2+ Peripheral Pulses, No clubbing, cyanosis, or edema          
[facility] Division of Hospital Medicine  Sonia Rivers MD  Available via MS Teams  Pager: [phone]    SUBJECTIVE / OVERNIGHT EVENTS:    Continues to have some lightheadedness with getting up. No chest/ab pain. No headache. No vision changes or any other neurologic changes     REVIEW OF SYSTEMS:    CONSTITUTIONAL: No weakness, fevers or chills  EYES/ENT: No visual changes;  No vertigo or throat pain   NECK: No pain or stiffness  RESPIRATORY: No cough, wheezing, hemoptysis; No shortness of breath  CARDIOVASCULAR: No chest pain or palpitations  GASTROINTESTINAL: No abdominal or epigastric pain. No nausea, vomiting, or hematemesis; No diarrhea or constipation. No melena or hematochezia.  GENITOURINARY: No dysuria, frequency or hematuria  NEUROLOGICAL: No numbness or weakness  SKIN: No itching, rashes    MEDICATIONS  (STANDING):  aspirin enteric coated 81 milliGRAM(s) Oral daily  atorvastatin 20 milliGRAM(s) Oral at bedtime  dextrose 5%. 1000 milliLiter(s) (50 mL/Hr) IV Continuous <Continuous>  dextrose 5%. 1000 milliLiter(s) (100 mL/Hr) IV Continuous <Continuous>  dextrose 50% Injectable 25 Gram(s) IV Push once  dextrose 50% Injectable 12.5 Gram(s) IV Push once  dextrose 50% Injectable 25 Gram(s) IV Push once  glucagon  Injectable 1 milliGRAM(s) IntraMuscular once  influenza   Vaccine 0.5 milliLiter(s) IntraMuscular once  insulin lispro (ADMELOG) corrective regimen sliding scale   SubCutaneous three times a day before meals  insulin lispro (ADMELOG) corrective regimen sliding scale   SubCutaneous at bedtime  losartan 50 milliGRAM(s) Oral daily  oxybutynin XL 10 milliGRAM(s) Oral every 24 hours  tamsulosin 0.8 milliGRAM(s) Oral at bedtime    MEDICATIONS  (PRN):  acetaminophen     Tablet .. 650 milliGRAM(s) Oral every 6 hours PRN Temp greater or equal to 38C (100.4F), Mild Pain (1 - 3)  aluminum hydroxide/magnesium hydroxide/simethicone Suspension 30 milliLiter(s) Oral every 4 hours PRN Dyspepsia  dextrose Oral Gel 15 Gram(s) Oral once PRN Blood Glucose LESS THAN 70 milliGRAM(s)/deciliter  meclizine 25 milliGRAM(s) Oral every 12 hours PRN Dizziness  melatonin 3 milliGRAM(s) Oral at bedtime PRN Insomnia      I&O's Summary    10 Sep 2022 07:01  -  11 Sep 2022 07:00  --------------------------------------------------------  IN: 680 mL / OUT: 1000 mL / NET: -320 mL    11 Sep 2022 07:01  -  11 Sep 2022 19:04  --------------------------------------------------------  IN: 210 mL / OUT: 825 mL / NET: -615 mL        PHYSICAL EXAM:  Vital Signs Last 24 Hrs  T(C): 36.6 (11 Sep 2022 16:17), Max: 36.8 (10 Sep 2022 20:05)  T(F): 97.9 (11 Sep 2022 16:17), Max: 98.2 (10 Sep 2022 20:05)  HR: 60 (11 Sep 2022 16:17) (48 - 77)  BP: 129/72 (11 Sep 2022 16:17) (129/72 - 150/70)  BP(mean): --  RR: 18 (11 Sep 2022 16:17) (18 - 19)  SpO2: 98% (11 Sep 2022 16:17) (96% - 99%)    Parameters below as of 11 Sep 2022 16:17  Patient On (Oxygen Delivery Method): room air      CONSTITUTIONAL: NAD, well-developed  EYES:  conjunctiva and sclera clear  NECK: Supple  RESPIRATORY: Normal respiratory effort; lungs are clear to auscultation bilaterally  CARDIOVASCULAR: Regular rate and rhythm, normal S1 and S2, no murmur/rub/gallop  ABDOMEN: Nontender to palpation, normoactive bowel sounds, no rebound/guarding; No hepatosplenomegaly  MUSCULOSKELETAL:  No lower extremity edema; Peripheral pulses are 2+ bilaterally  PSYCH: A+O to person, place, and time; affect appropriate  NEUROLOGY: no gross motor or sensory deficits   SKIN: No rashes; no palpable lesions    LABS:                        14.5   7.15  )-----------( 247      ( 11 Sep 2022 06:57 )             45.7     09-11    137  |  102  |  16  ----------------------------<  117<H>  4.0   |  23  |  0.82    Ca    8.9      11 Sep 2022 06:57  Phos  3.2     09-11  Mg     1.90     09-11    TPro  6.5  /  Alb  3.6  /  TBili  0.5  /  DBili  x   /  AST  14  /  ALT  12  /  AlkPhos  63  09-11              COVID-19 PCR: NotDetec (09 Sep 2022 15:55)      RADIOLOGY & ADDITIONAL TESTS:  New Results Reviewed Today:   New Imaging Personally Reviewed Today:  New Electrocardiogram Personally Reviewed Today:  Prior or Outpatient Records Reviewed Today:    COMMUNICATION:  Care Discussed with Consultants/Other Providers and Details of Discussion:  Discussions with Patient/Family:  PCP Communication:    
Patient is a 62y old  Male who presents with a chief complaint of Dizziness x1 week (21 Sep 2022 09:27)      SUBJECTIVE / OVERNIGHT EVENTS: Pt has no complaints today, dizziness improved. Not orthostatic on Vitals.     MEDICATIONS  (STANDING):  aspirin enteric coated 81 milliGRAM(s) Oral daily  atorvastatin 20 milliGRAM(s) Oral at bedtime  clopidogrel Tablet 75 milliGRAM(s) Oral daily  dextrose 5%. 1000 milliLiter(s) (50 mL/Hr) IV Continuous <Continuous>  dextrose 5%. 1000 milliLiter(s) (100 mL/Hr) IV Continuous <Continuous>  dextrose 50% Injectable 25 Gram(s) IV Push once  dextrose 50% Injectable 12.5 Gram(s) IV Push once  dextrose 50% Injectable 25 Gram(s) IV Push once  glucagon  Injectable 1 milliGRAM(s) IntraMuscular once  influenza   Vaccine 0.5 milliLiter(s) IntraMuscular once  insulin lispro (ADMELOG) corrective regimen sliding scale   SubCutaneous three times a day before meals  insulin lispro (ADMELOG) corrective regimen sliding scale   SubCutaneous at bedtime  losartan 50 milliGRAM(s) Oral daily  oxybutynin XL 10 milliGRAM(s) Oral every 24 hours  tamsulosin 0.8 milliGRAM(s) Oral at bedtime    MEDICATIONS  (PRN):  acetaminophen     Tablet .. 650 milliGRAM(s) Oral every 6 hours PRN Temp greater or equal to 38C (100.4F), Mild Pain (1 - 3)  aluminum hydroxide/magnesium hydroxide/simethicone Suspension 30 milliLiter(s) Oral every 4 hours PRN Dyspepsia  dextrose Oral Gel 15 Gram(s) Oral once PRN Blood Glucose LESS THAN 70 milliGRAM(s)/deciliter  meclizine 25 milliGRAM(s) Oral every 12 hours PRN Dizziness  melatonin 3 milliGRAM(s) Oral at bedtime PRN Insomnia      Vital Signs Last 24 Hrs  T(C): 37 (21 Sep 2022 12:33), Max: 37 (21 Sep 2022 12:33)  T(F): 98.6 (21 Sep 2022 12:33), Max: 98.6 (21 Sep 2022 12:33)  HR: 64 (21 Sep 2022 12:33) (59 - 72)  BP: 164/72 (21 Sep 2022 12:33) (150/76 - 178/78)  BP(mean): --  RR: 18 (21 Sep 2022 12:33) (18 - 18)  SpO2: 100% (21 Sep 2022 12:33) (97% - 100%)    Parameters below as of 21 Sep 2022 12:33  Patient On (Oxygen Delivery Method): room air      CAPILLARY BLOOD GLUCOSE      POCT Blood Glucose.: 182 mg/dL (21 Sep 2022 12:01)  POCT Blood Glucose.: 106 mg/dL (21 Sep 2022 09:22)  POCT Blood Glucose.: 188 mg/dL (20 Sep 2022 21:48)  POCT Blood Glucose.: 136 mg/dL (20 Sep 2022 17:36)    I&O's Summary    20 Sep 2022 07:01  -  21 Sep 2022 07:00  --------------------------------------------------------  IN: 1000 mL / OUT: 1925 mL / NET: -925 mL    21 Sep 2022 07:01  -  21 Sep 2022 13:35  --------------------------------------------------------  IN: 0 mL / OUT: 600 mL / NET: -600 mL        PHYSICAL EXAM:  GENERAL: NAD, well-developed  HEAD:  Atraumatic, Normocephalic  EYES: EOMI, PERRLA, conjunctiva and sclera clear  NECK: Supple, No JVD  CHEST/LUNG: Clear to auscultation bilaterally; No wheeze  HEART: Regular rate and rhythm; No murmurs, rubs, or gallops  ABDOMEN: Soft, Nontender, Nondistended; Bowel sounds present  EXTREMITIES:  2+ Peripheral Pulses, No clubbing, cyanosis, or edema  PSYCH: AAOx3  NEUROLOGY: non-focal  SKIN: No rashes or lesions    LABS:                        13.6   7.41  )-----------( 229      ( 21 Sep 2022 06:04 )             42.7     09-21    140  |  106  |  13  ----------------------------<  119<H>  4.1   |  24  |  0.79    Ca    8.8      21 Sep 2022 06:04  Phos  3.0     09-21  Mg     1.70     09-21    TPro  6.7  /  Alb  3.6  /  TBili  0.5  /  DBili  x   /  AST  13  /  ALT  16  /  AlkPhos  59  09-21              RADIOLOGY & ADDITIONAL TESTS:    Imaging Personally Reviewed:    Consultant(s) Notes Reviewed:  EPS    Care Discussed with Consultants/Other Providers:
[facility] Division of Hospital Medicine  Sonia Rivers MD  Available via MS Teams  Pager: [phone]    SUBJECTIVE / OVERNIGHT EVENTS:    Has 2 episodes of lightheadedness yesterday   Otherwise, medically stable. No pain anywhere including chest/ab/HA   Eating and drinking well. BM regular   In good spirits     REVIEW OF SYSTEMS:    CONSTITUTIONAL: No weakness, fevers or chills  EYES/ENT: No visual changes;  No vertigo or throat pain   NECK: No pain or stiffness  RESPIRATORY: No cough, wheezing, hemoptysis; No shortness of breath  CARDIOVASCULAR: No chest pain or palpitations  GASTROINTESTINAL: No abdominal or epigastric pain. No nausea, vomiting, or hematemesis; No diarrhea or constipation. No melena or hematochezia.  GENITOURINARY: No dysuria, frequency or hematuria  NEUROLOGICAL: No numbness or weakness  SKIN: No itching, rashes    MEDICATIONS  (STANDING):  aspirin enteric coated 81 milliGRAM(s) Oral daily  atorvastatin 20 milliGRAM(s) Oral at bedtime  dextrose 5%. 1000 milliLiter(s) (50 mL/Hr) IV Continuous <Continuous>  dextrose 5%. 1000 milliLiter(s) (100 mL/Hr) IV Continuous <Continuous>  dextrose 50% Injectable 25 Gram(s) IV Push once  dextrose 50% Injectable 12.5 Gram(s) IV Push once  dextrose 50% Injectable 25 Gram(s) IV Push once  glucagon  Injectable 1 milliGRAM(s) IntraMuscular once  influenza   Vaccine 0.5 milliLiter(s) IntraMuscular once  insulin lispro (ADMELOG) corrective regimen sliding scale   SubCutaneous three times a day before meals  insulin lispro (ADMELOG) corrective regimen sliding scale   SubCutaneous at bedtime  losartan 50 milliGRAM(s) Oral daily  oxybutynin XL 10 milliGRAM(s) Oral every 24 hours  tamsulosin 0.8 milliGRAM(s) Oral at bedtime    MEDICATIONS  (PRN):  acetaminophen     Tablet .. 650 milliGRAM(s) Oral every 6 hours PRN Temp greater or equal to 38C (100.4F), Mild Pain (1 - 3)  aluminum hydroxide/magnesium hydroxide/simethicone Suspension 30 milliLiter(s) Oral every 4 hours PRN Dyspepsia  dextrose Oral Gel 15 Gram(s) Oral once PRN Blood Glucose LESS THAN 70 milliGRAM(s)/deciliter  meclizine 25 milliGRAM(s) Oral every 12 hours PRN Dizziness  melatonin 3 milliGRAM(s) Oral at bedtime PRN Insomnia      I&O's Summary    11 Sep 2022 07:01  -  12 Sep 2022 07:00  --------------------------------------------------------  IN: 210 mL / OUT: 1175 mL / NET: -965 mL    12 Sep 2022 07:01  -  12 Sep 2022 15:48  --------------------------------------------------------  IN: 0 mL / OUT: 300 mL / NET: -300 mL        PHYSICAL EXAM:  Vital Signs Last 24 Hrs  T(C): 37.2 (12 Sep 2022 12:20), Max: 37.2 (12 Sep 2022 04:23)  T(F): 99 (12 Sep 2022 12:20), Max: 99 (12 Sep 2022 04:23)  HR: 75 (12 Sep 2022 12:20) (60 - 75)  BP: 126/63 (12 Sep 2022 12:20) (126/63 - 145/73)  BP(mean): --  RR: 18 (12 Sep 2022 12:20) (17 - 18)  SpO2: 100% (12 Sep 2022 12:20) (97% - 100%)    Parameters below as of 12 Sep 2022 12:20  Patient On (Oxygen Delivery Method): room air      CONSTITUTIONAL: NAD, well-developed  EYES:  conjunctiva and sclera clear  NECK: Supple  RESPIRATORY: Normal respiratory effort; lungs are clear to auscultation bilaterally  CARDIOVASCULAR: Regular rate and rhythm, normal S1 and S2, no murmur/rub/gallop  ABDOMEN: Nontender to palpation, normoactive bowel sounds, no rebound/guarding; No hepatosplenomegaly  MUSCULOSKELETAL:  No lower extremity edema; Peripheral pulses are 2+ bilaterally  PSYCH: A+O to person, place, and time; affect appropriate  NEUROLOGY: no gross motor or sensory deficits   SKIN: No rashes; no palpable lesions    LABS:                        15.1   7.42  )-----------( 226      ( 12 Sep 2022 06:15 )             44.9     09-12    136  |  102  |  20  ----------------------------<  123<H>  4.2   |  23  |  0.84    Ca    9.2      12 Sep 2022 06:15  Phos  3.6     09-12  Mg     1.70     09-12    TPro  6.9  /  Alb  3.9  /  TBili  0.4  /  DBili  x   /  AST  14  /  ALT  14  /  AlkPhos  72  09-12              COVID-19 PCR: NotDetec (09 Sep 2022 15:55)      RADIOLOGY & ADDITIONAL TESTS:  New Results Reviewed Today:   New Imaging Personally Reviewed Today:  New Electrocardiogram Personally Reviewed Today:  Prior or Outpatient Records Reviewed Today:    COMMUNICATION:  Care Discussed with Consultants/Other Providers and Details of Discussion:  Discussions with Patient/Family:  PCP Communication:    
[facility] Division of Hospital Medicine  Sonia Rivers MD  Available via MS Teams  Pager: [phone]    SUBJECTIVE / OVERNIGHT EVENTS:    Continues to have 1 event per day of lightheadedness that lasts for about 30 seconds     REVIEW OF SYSTEMS:    CONSTITUTIONAL: No weakness, fevers or chills  EYES/ENT: No visual changes;  No vertigo or throat pain   NECK: No pain or stiffness  RESPIRATORY: No cough, wheezing, hemoptysis; No shortness of breath  CARDIOVASCULAR: No chest pain or palpitations  GASTROINTESTINAL: No abdominal or epigastric pain. No nausea, vomiting, or hematemesis; No diarrhea or constipation. No melena or hematochezia.  GENITOURINARY: No dysuria, frequency or hematuria  NEUROLOGICAL: No numbness or weakness  SKIN: No itching, rashes    MEDICATIONS  (STANDING):  aspirin enteric coated 81 milliGRAM(s) Oral daily  atorvastatin 20 milliGRAM(s) Oral at bedtime  clopidogrel Tablet 75 milliGRAM(s) Oral daily  dextrose 5%. 1000 milliLiter(s) (50 mL/Hr) IV Continuous <Continuous>  dextrose 5%. 1000 milliLiter(s) (100 mL/Hr) IV Continuous <Continuous>  dextrose 50% Injectable 12.5 Gram(s) IV Push once  dextrose 50% Injectable 25 Gram(s) IV Push once  dextrose 50% Injectable 25 Gram(s) IV Push once  glucagon  Injectable 1 milliGRAM(s) IntraMuscular once  influenza   Vaccine 0.5 milliLiter(s) IntraMuscular once  insulin lispro (ADMELOG) corrective regimen sliding scale   SubCutaneous three times a day before meals  insulin lispro (ADMELOG) corrective regimen sliding scale   SubCutaneous at bedtime  losartan 50 milliGRAM(s) Oral daily  oxybutynin XL 10 milliGRAM(s) Oral every 24 hours  tamsulosin 0.8 milliGRAM(s) Oral at bedtime    MEDICATIONS  (PRN):  acetaminophen     Tablet .. 650 milliGRAM(s) Oral every 6 hours PRN Temp greater or equal to 38C (100.4F), Mild Pain (1 - 3)  aluminum hydroxide/magnesium hydroxide/simethicone Suspension 30 milliLiter(s) Oral every 4 hours PRN Dyspepsia  dextrose Oral Gel 15 Gram(s) Oral once PRN Blood Glucose LESS THAN 70 milliGRAM(s)/deciliter  meclizine 25 milliGRAM(s) Oral every 12 hours PRN Dizziness  melatonin 3 milliGRAM(s) Oral at bedtime PRN Insomnia      I&O's Summary    14 Sep 2022 07:01  -  15 Sep 2022 07:00  --------------------------------------------------------  IN: 100 mL / OUT: 1200 mL / NET: -1100 mL        PHYSICAL EXAM:  Vital Signs Last 24 Hrs  T(C): 36.7 (15 Sep 2022 12:12), Max: 36.7 (14 Sep 2022 17:07)  T(F): 98 (15 Sep 2022 12:12), Max: 98 (14 Sep 2022 17:07)  HR: 78 (15 Sep 2022 12:12) (60 - 86)  BP: 140/71 (15 Sep 2022 12:12) (134/67 - 182/74)  BP(mean): --  RR: 16 (15 Sep 2022 12:12) (16 - 18)  SpO2: 97% (15 Sep 2022 12:12) (97% - 100%)    Parameters below as of 15 Sep 2022 12:12  Patient On (Oxygen Delivery Method): room air      CONSTITUTIONAL: NAD, well-developed  EYES:  conjunctiva and sclera clear  NECK: Supple  RESPIRATORY: Normal respiratory effort; lungs are clear to auscultation bilaterally  CARDIOVASCULAR: Regular rate and rhythm, normal S1 and S2, no murmur/rub/gallop  ABDOMEN: Nontender to palpation, normoactive bowel sounds, no rebound/guarding; No hepatosplenomegaly  MUSCULOSKELETAL:  No lower extremity edema; Peripheral pulses are 2+ bilaterally  PSYCH: A+O to person, place, and time; affect appropriate  NEUROLOGY: no gross motor or sensory deficits   SKIN: No rashes; no palpable lesions    LABS:                        13.9   6.41  )-----------( 232      ( 15 Sep 2022 07:29 )             44.2     09-15    142  |  106  |  14  ----------------------------<  114<H>  4.1   |  23  |  0.72    Ca    9.0      15 Sep 2022 07:29  Phos  3.1     09-15  Mg     1.70     09-15                COVID-19 PCR: NotDetec (09 Sep 2022 15:55)      RADIOLOGY & ADDITIONAL TESTS:  New Results Reviewed Today:   New Imaging Personally Reviewed Today:  New Electrocardiogram Personally Reviewed Today:  Prior or Outpatient Records Reviewed Today:    COMMUNICATION:  Care Discussed with Consultants/Other Providers and Details of Discussion:  Discussions with Patient/Family:  PCP Communication:    
Patient is a 62y old  Male who presents with a chief complaint of Dizziness x1 week (16 Sep 2022 14:51)    Cortez Fiore MD   [facility] of Hospital Medicine   Pager [phone]  Reachable on Microsoft Teams     SUBJECTIVE / OVERNIGHT EVENTS:  Patient seen and examined this morning, still with some dizziness. No events overnight     MEDICATIONS  (STANDING):  aspirin enteric coated 81 milliGRAM(s) Oral daily  atorvastatin 20 milliGRAM(s) Oral at bedtime  clopidogrel Tablet 75 milliGRAM(s) Oral daily  dextrose 5%. 1000 milliLiter(s) (100 mL/Hr) IV Continuous <Continuous>  dextrose 5%. 1000 milliLiter(s) (50 mL/Hr) IV Continuous <Continuous>  dextrose 50% Injectable 25 Gram(s) IV Push once  dextrose 50% Injectable 12.5 Gram(s) IV Push once  dextrose 50% Injectable 25 Gram(s) IV Push once  glucagon  Injectable 1 milliGRAM(s) IntraMuscular once  influenza   Vaccine 0.5 milliLiter(s) IntraMuscular once  insulin lispro (ADMELOG) corrective regimen sliding scale   SubCutaneous three times a day before meals  insulin lispro (ADMELOG) corrective regimen sliding scale   SubCutaneous at bedtime  losartan 50 milliGRAM(s) Oral daily  oxybutynin XL 10 milliGRAM(s) Oral every 24 hours  tamsulosin 0.8 milliGRAM(s) Oral at bedtime    MEDICATIONS  (PRN):  acetaminophen     Tablet .. 650 milliGRAM(s) Oral every 6 hours PRN Temp greater or equal to 38C (100.4F), Mild Pain (1 - 3)  aluminum hydroxide/magnesium hydroxide/simethicone Suspension 30 milliLiter(s) Oral every 4 hours PRN Dyspepsia  dextrose Oral Gel 15 Gram(s) Oral once PRN Blood Glucose LESS THAN 70 milliGRAM(s)/deciliter  meclizine 25 milliGRAM(s) Oral every 12 hours PRN Dizziness  melatonin 3 milliGRAM(s) Oral at bedtime PRN Insomnia      Vital Signs Last 24 Hrs  T(C): 36.7 (17 Sep 2022 05:12), Max: 36.7 (16 Sep 2022 17:57)  T(F): 98.1 (17 Sep 2022 05:12), Max: 98.1 (16 Sep 2022 17:57)  HR: 80 (17 Sep 2022 06:05) (80 - 86)  BP: 138/77 (17 Sep 2022 06:05) (138/77 - 189/92)  BP(mean): --  RR: 18 (17 Sep 2022 06:05) (16 - 18)  SpO2: 100% (17 Sep 2022 06:05) (98% - 100%)  CAPILLARY BLOOD GLUCOSE      POCT Blood Glucose.: 121 mg/dL (17 Sep 2022 09:34)  POCT Blood Glucose.: 181 mg/dL (16 Sep 2022 22:06)  POCT Blood Glucose.: 143 mg/dL (16 Sep 2022 18:03)    I&O's Summary    16 Sep 2022 07:01  -  17 Sep 2022 07:00  --------------------------------------------------------  IN: 440 mL / OUT: 2320 mL / NET: -1880 mL        General: NAD, awake and alert  Eyes: EOMI  Respiratory: No respiratory distress, CTABL, No rales, rhonchi, wheezing.  Cardiovascular: S1,S2; Regular rate and rhythm; No m/g/r. 2+ peripheral pulses  Gastrointestinal: Soft, Nontender, Nondistended; +BS.   Extremities: No c/c/e; warm to touch  Neurological: Face symmetric, speech is fluid, strength is 5/5 in all extremities.   Skin: No rashes, No erythema   Psych: appropriate mood and affect    LABS:                        14.6   6.97  )-----------( 240      ( 17 Sep 2022 06:39 )             46.5     09-17    134<L>  |  99  |  21  ----------------------------<  167<H>  4.5   |  23  |  0.88    Ca    9.1      17 Sep 2022 06:39  Mg     1.80     09-17                RADIOLOGY & ADDITIONAL TESTS:    Imaging Personally Reviewed:    Consultant(s) Notes Reviewed:      Care Discussed with Consultants/Other Providers: Jace MCKEON  
Patient is a 62y old  Male who presents with a chief complaint of Dizziness x1 week (17 Sep 2022 13:20)    Cortez Fiore MD   [facility]   Pager [phone]  Reachable on Microsoft Teams     SUBJECTIVE / OVERNIGHT EVENTS:  Patient seen and examined today.    MEDICATIONS  (STANDING):  aspirin enteric coated 81 milliGRAM(s) Oral daily  atorvastatin 20 milliGRAM(s) Oral at bedtime  clopidogrel Tablet 75 milliGRAM(s) Oral daily  dextrose 5%. 1000 milliLiter(s) (100 mL/Hr) IV Continuous <Continuous>  dextrose 5%. 1000 milliLiter(s) (50 mL/Hr) IV Continuous <Continuous>  dextrose 50% Injectable 25 Gram(s) IV Push once  dextrose 50% Injectable 12.5 Gram(s) IV Push once  dextrose 50% Injectable 25 Gram(s) IV Push once  glucagon  Injectable 1 milliGRAM(s) IntraMuscular once  influenza   Vaccine 0.5 milliLiter(s) IntraMuscular once  insulin lispro (ADMELOG) corrective regimen sliding scale   SubCutaneous three times a day before meals  insulin lispro (ADMELOG) corrective regimen sliding scale   SubCutaneous at bedtime  losartan 50 milliGRAM(s) Oral daily  oxybutynin XL 10 milliGRAM(s) Oral every 24 hours  tamsulosin 0.8 milliGRAM(s) Oral at bedtime    MEDICATIONS  (PRN):  acetaminophen     Tablet .. 650 milliGRAM(s) Oral every 6 hours PRN Temp greater or equal to 38C (100.4F), Mild Pain (1 - 3)  aluminum hydroxide/magnesium hydroxide/simethicone Suspension 30 milliLiter(s) Oral every 4 hours PRN Dyspepsia  dextrose Oral Gel 15 Gram(s) Oral once PRN Blood Glucose LESS THAN 70 milliGRAM(s)/deciliter  meclizine 25 milliGRAM(s) Oral every 12 hours PRN Dizziness  melatonin 3 milliGRAM(s) Oral at bedtime PRN Insomnia      Vital Signs Last 24 Hrs  T(C): 36.8 (18 Sep 2022 05:06), Max: 36.8 (18 Sep 2022 05:06)  T(F): 98.2 (18 Sep 2022 05:06), Max: 98.2 (18 Sep 2022 05:06)  HR: 70 (18 Sep 2022 06:48) (70 - 78)  BP: 159/82 (18 Sep 2022 06:48) (142/80 - 165/79)  BP(mean): --  RR: 16 (18 Sep 2022 05:06) (16 - 18)  SpO2: 99% (18 Sep 2022 05:06) (97% - 99%)  CAPILLARY BLOOD GLUCOSE      POCT Blood Glucose.: 123 mg/dL (18 Sep 2022 08:57)  POCT Blood Glucose.: 133 mg/dL (17 Sep 2022 21:18)  POCT Blood Glucose.: 87 mg/dL (17 Sep 2022 17:36)    I&O's Summary    17 Sep 2022 07:01  -  18 Sep 2022 07:00  --------------------------------------------------------  IN: 0 mL / OUT: 2775 mL / NET: -2775 mL    18 Sep 2022 07:01  -  18 Sep 2022 10:40  --------------------------------------------------------  IN: 0 mL / OUT: 300 mL / NET: -300 mL        General: NAD, awake and alert  Eyes: EOMI  Respiratory: No respiratory distress, CTABL, No rales, rhonchi, wheezing.  Cardiovascular: S1,S2; Regular rate and rhythm; No m/g/r. 2+ peripheral pulses  Gastrointestinal: Soft, Nontender, Nondistended; +BS.   Extremities: No c/c/e; warm to touch  Neurological: Face symmetric, speech is fluid, strength is 5/5 in all extremities.   Skin: No rashes, No erythema   Psych: appropriate mood and affect    LABS:                        13.9   6.65  )-----------( 227      ( 18 Sep 2022 06:10 )             42.4     09-18    137  |  105  |  21  ----------------------------<  108<H>  4.1   |  22  |  0.80    Ca    9.1      18 Sep 2022 06:10  Mg     1.70     09-18                RADIOLOGY & ADDITIONAL TESTS:    Imaging Personally Reviewed:    Consultant(s) Notes Reviewed:      Care Discussed with Consultants/Other Providers: Candie MCKEON  
[facility] Division of Hospital Medicine  Sonia Rivers MD  Available via MS Teams  Pager: [phone]    SUBJECTIVE / OVERNIGHT EVENTS:    While still, no vertigo or lightheadedness. With movement, both come on. No n/v     REVIEW OF SYSTEMS:    CONSTITUTIONAL: No weakness, fevers or chills  EYES/ENT: No visual changes;  No vertigo or throat pain   NECK: No pain or stiffness  RESPIRATORY: No cough, wheezing, hemoptysis; No shortness of breath  CARDIOVASCULAR: No chest pain or palpitations  GASTROINTESTINAL: No abdominal or epigastric pain. No nausea, vomiting, or hematemesis; No diarrhea or constipation. No melena or hematochezia.  GENITOURINARY: No dysuria, frequency or hematuria  NEUROLOGICAL: No numbness or weakness  SKIN: No itching, rashes    MEDICATIONS  (STANDING):  aspirin enteric coated 81 milliGRAM(s) Oral daily  atorvastatin 20 milliGRAM(s) Oral at bedtime  dextrose 5%. 1000 milliLiter(s) (50 mL/Hr) IV Continuous <Continuous>  dextrose 5%. 1000 milliLiter(s) (100 mL/Hr) IV Continuous <Continuous>  dextrose 50% Injectable 25 Gram(s) IV Push once  dextrose 50% Injectable 12.5 Gram(s) IV Push once  dextrose 50% Injectable 25 Gram(s) IV Push once  glucagon  Injectable 1 milliGRAM(s) IntraMuscular once  influenza   Vaccine 0.5 milliLiter(s) IntraMuscular once  insulin lispro (ADMELOG) corrective regimen sliding scale   SubCutaneous three times a day before meals  insulin lispro (ADMELOG) corrective regimen sliding scale   SubCutaneous at bedtime  losartan 50 milliGRAM(s) Oral daily  metoprolol succinate ER 25 milliGRAM(s) Oral daily  oxybutynin 10 milliGRAM(s) Oral every 24 hours  tamsulosin 0.8 milliGRAM(s) Oral at bedtime    MEDICATIONS  (PRN):  acetaminophen     Tablet .. 650 milliGRAM(s) Oral every 6 hours PRN Temp greater or equal to 38C (100.4F), Mild Pain (1 - 3)  aluminum hydroxide/magnesium hydroxide/simethicone Suspension 30 milliLiter(s) Oral every 4 hours PRN Dyspepsia  dextrose Oral Gel 15 Gram(s) Oral once PRN Blood Glucose LESS THAN 70 milliGRAM(s)/deciliter  meclizine 25 milliGRAM(s) Oral every 12 hours PRN Dizziness  melatonin 3 milliGRAM(s) Oral at bedtime PRN Insomnia      I&O's Summary      PHYSICAL EXAM:  Vital Signs Last 24 Hrs  T(C): 36.8 (10 Sep 2022 20:05), Max: 37 (10 Sep 2022 13:15)  T(F): 98.2 (10 Sep 2022 20:05), Max: 98.6 (10 Sep 2022 13:15)  HR: 77 (10 Sep 2022 20:05) (59 - 87)  BP: 140/79 (10 Sep 2022 20:05) (138/77 - 169/85)  BP(mean): --  RR: 18 (10 Sep 2022 20:05) (16 - 18)  SpO2: 99% (10 Sep 2022 20:05) (97% - 100%)    Parameters below as of 10 Sep 2022 20:05  Patient On (Oxygen Delivery Method): room air      CONSTITUTIONAL: NAD, well-developed  EYES:  conjunctiva and sclera clear  NECK: Supple  RESPIRATORY: Normal respiratory effort; lungs are clear to auscultation bilaterally  CARDIOVASCULAR: Regular rate and rhythm, normal S1 and S2, no murmur/rub/gallop  ABDOMEN: Nontender to palpation, normoactive bowel sounds, no rebound/guarding; No hepatosplenomegaly  MUSCULOSKELETAL:  No lower extremity edema; Peripheral pulses are 2+ bilaterally  PSYCH: A+O to person, place, and time; affect appropriate  NEUROLOGY: no gross motor or sensory deficits   SKIN: No rashes; no palpable lesions    LABS:                        14.1   6.86  )-----------( 230      ( 10 Sep 2022 07:05 )             45.1     09-10    140  |  104  |  11  ----------------------------<  76  4.2   |  25  |  0.81    Ca    9.1      10 Sep 2022 07:05  Phos  2.7     09-09  Mg     1.80     09-09    TPro  6.6  /  Alb  3.8  /  TBili  0.5  /  DBili  x   /  AST  15  /  ALT  14  /  AlkPhos  61  09-10      CARDIAC MARKERS ( 09 Sep 2022 18:20 )  x     / x     / 54 U/L / x     / 2.8 ng/mL          COVID-19 PCR: NotDetec (09 Sep 2022 15:55)      RADIOLOGY & ADDITIONAL TESTS:  New Results Reviewed Today:   New Imaging Personally Reviewed Today:  New Electrocardiogram Personally Reviewed Today:  Prior or Outpatient Records Reviewed Today:    COMMUNICATION:  Care Discussed with Consultants/Other Providers and Details of Discussion:  Discussions with Patient/Family:  PCP Communication:      
Patient is a 62y old  Male who presents with a chief complaint of Dizziness x1 week (22 Sep 2022 09:49)      SUBJECTIVE / OVERNIGHT EVENTS: No complaints today. Wants to go home. S/P ILR yesterday.     MEDICATIONS  (STANDING):  aspirin enteric coated 81 milliGRAM(s) Oral daily  atorvastatin 20 milliGRAM(s) Oral at bedtime  clopidogrel Tablet 75 milliGRAM(s) Oral daily  dextrose 5%. 1000 milliLiter(s) (100 mL/Hr) IV Continuous <Continuous>  dextrose 5%. 1000 milliLiter(s) (50 mL/Hr) IV Continuous <Continuous>  dextrose 50% Injectable 25 Gram(s) IV Push once  dextrose 50% Injectable 12.5 Gram(s) IV Push once  dextrose 50% Injectable 25 Gram(s) IV Push once  glucagon  Injectable 1 milliGRAM(s) IntraMuscular once  influenza   Vaccine 0.5 milliLiter(s) IntraMuscular once  insulin lispro (ADMELOG) corrective regimen sliding scale   SubCutaneous three times a day before meals  insulin lispro (ADMELOG) corrective regimen sliding scale   SubCutaneous at bedtime  losartan 50 milliGRAM(s) Oral daily  oxybutynin XL 10 milliGRAM(s) Oral every 24 hours  tamsulosin 0.8 milliGRAM(s) Oral at bedtime    MEDICATIONS  (PRN):  acetaminophen     Tablet .. 650 milliGRAM(s) Oral every 6 hours PRN Temp greater or equal to 38C (100.4F), Mild Pain (1 - 3)  aluminum hydroxide/magnesium hydroxide/simethicone Suspension 30 milliLiter(s) Oral every 4 hours PRN Dyspepsia  dextrose Oral Gel 15 Gram(s) Oral once PRN Blood Glucose LESS THAN 70 milliGRAM(s)/deciliter  meclizine 25 milliGRAM(s) Oral every 12 hours PRN Dizziness  melatonin 3 milliGRAM(s) Oral at bedtime PRN Insomnia      Vital Signs Last 24 Hrs  T(C): 36.4 (22 Sep 2022 12:42), Max: 37.2 (21 Sep 2022 22:19)  T(F): 97.6 (22 Sep 2022 12:42), Max: 98.9 (21 Sep 2022 22:19)  HR: 98 (22 Sep 2022 12:42) (62 - 98)  BP: 150/72 (22 Sep 2022 12:42) (150/72 - 180/79)  BP(mean): 103 (22 Sep 2022 02:38) (101 - 103)  RR: 18 (22 Sep 2022 12:42) (18 - 18)  SpO2: 100% (22 Sep 2022 12:42) (99% - 100%)    Parameters below as of 22 Sep 2022 12:42  Patient On (Oxygen Delivery Method): room air      CAPILLARY BLOOD GLUCOSE      POCT Blood Glucose.: 186 mg/dL (22 Sep 2022 12:29)  POCT Blood Glucose.: 112 mg/dL (22 Sep 2022 09:11)  POCT Blood Glucose.: 215 mg/dL (21 Sep 2022 21:40)  POCT Blood Glucose.: 102 mg/dL (21 Sep 2022 17:30)    I&O's Summary    21 Sep 2022 07:01  -  22 Sep 2022 07:00  --------------------------------------------------------  IN: 1000 mL / OUT: 1600 mL / NET: -600 mL    22 Sep 2022 07:01  -  22 Sep 2022 14:02  --------------------------------------------------------  IN: 360 mL / OUT: 200 mL / NET: 160 mL        PHYSICAL EXAM:  GENERAL: NAD, well-developed  HEAD:  Atraumatic, Normocephalic  EYES: EOMI, PERRLA, conjunctiva and sclera clear  NECK: Supple, No JVD  CHEST/LUNG: Clear to auscultation bilaterally; No wheeze  HEART: Regular rate and rhythm; No murmurs, rubs, or gallops  ABDOMEN: Soft, Nontender, Nondistended; Bowel sounds present  EXTREMITIES:  2+ Peripheral Pulses, No clubbing, cyanosis, or edema  PSYCH: AAOx3  NEUROLOGY: non-focal  SKIN: No rashes or lesions    LABS:                        14.2   6.96  )-----------( 239      ( 22 Sep 2022 06:15 )             44.6     09-22    138  |  103  |  14  ----------------------------<  125<H>  4.2   |  26  |  0.81    Ca    9.5      22 Sep 2022 06:15  Phos  3.4     09-22  Mg     1.70     09-22    TPro  7.4  /  Alb  4.0  /  TBili  0.5  /  DBili  x   /  AST  14  /  ALT  15  /  AlkPhos  67  09-22              RADIOLOGY & ADDITIONAL TESTS:    Imaging Personally Reviewed:    Consultant(s) Notes Reviewed:      Care Discussed with Consultants/Other Providers:
[facility] Division of Hospital Medicine  Sonia Rivers MD  Available via MS Teams  Pager: [phone]    SUBJECTIVE / OVERNIGHT EVENTS:    No new complaints this morning.   Continues to have lightheadedness x1 at rest   Otherwise, feels well     REVIEW OF SYSTEMS:    CONSTITUTIONAL: No weakness, fevers or chills  EYES/ENT: No visual changes;  No vertigo or throat pain   NECK: No pain or stiffness  RESPIRATORY: No cough, wheezing, hemoptysis; No shortness of breath  CARDIOVASCULAR: No chest pain or palpitations  GASTROINTESTINAL: No abdominal or epigastric pain. No nausea, vomiting, or hematemesis; No diarrhea or constipation. No melena or hematochezia.  GENITOURINARY: No dysuria, frequency or hematuria  NEUROLOGICAL: No numbness or weakness  SKIN: No itching, rashes    MEDICATIONS  (STANDING):  aspirin enteric coated 81 milliGRAM(s) Oral daily  atorvastatin 20 milliGRAM(s) Oral at bedtime  dextrose 5%. 1000 milliLiter(s) (50 mL/Hr) IV Continuous <Continuous>  dextrose 5%. 1000 milliLiter(s) (100 mL/Hr) IV Continuous <Continuous>  dextrose 50% Injectable 25 Gram(s) IV Push once  dextrose 50% Injectable 12.5 Gram(s) IV Push once  dextrose 50% Injectable 25 Gram(s) IV Push once  glucagon  Injectable 1 milliGRAM(s) IntraMuscular once  influenza   Vaccine 0.5 milliLiter(s) IntraMuscular once  insulin lispro (ADMELOG) corrective regimen sliding scale   SubCutaneous three times a day before meals  insulin lispro (ADMELOG) corrective regimen sliding scale   SubCutaneous at bedtime  losartan 50 milliGRAM(s) Oral daily  oxybutynin XL 10 milliGRAM(s) Oral every 24 hours  tamsulosin 0.8 milliGRAM(s) Oral at bedtime    MEDICATIONS  (PRN):  acetaminophen     Tablet .. 650 milliGRAM(s) Oral every 6 hours PRN Temp greater or equal to 38C (100.4F), Mild Pain (1 - 3)  aluminum hydroxide/magnesium hydroxide/simethicone Suspension 30 milliLiter(s) Oral every 4 hours PRN Dyspepsia  dextrose Oral Gel 15 Gram(s) Oral once PRN Blood Glucose LESS THAN 70 milliGRAM(s)/deciliter  meclizine 25 milliGRAM(s) Oral every 12 hours PRN Dizziness  melatonin 3 milliGRAM(s) Oral at bedtime PRN Insomnia      I&O's Summary    12 Sep 2022 07:01  -  13 Sep 2022 07:00  --------------------------------------------------------  IN: 0 mL / OUT: 1350 mL / NET: -1350 mL    13 Sep 2022 07:01  -  13 Sep 2022 14:11  --------------------------------------------------------  IN: 0 mL / OUT: 800 mL / NET: -800 mL        PHYSICAL EXAM:  Vital Signs Last 24 Hrs  T(C): 36.7 (13 Sep 2022 12:31), Max: 36.8 (12 Sep 2022 23:49)  T(F): 98 (13 Sep 2022 12:31), Max: 98.3 (12 Sep 2022 23:49)  HR: 81 (13 Sep 2022 12:31) (66 - 81)  BP: 146/77 (13 Sep 2022 12:31) (137/56 - 156/70)  BP(mean): --  RR: 18 (13 Sep 2022 12:31) (18 - 18)  SpO2: 98% (13 Sep 2022 12:31) (97% - 99%)    Parameters below as of 13 Sep 2022 12:31  Patient On (Oxygen Delivery Method): room air      CONSTITUTIONAL: NAD, well-developed  EYES:  conjunctiva and sclera clear  NECK: Supple  RESPIRATORY: Normal respiratory effort; lungs are clear to auscultation bilaterally  CARDIOVASCULAR: Regular rate and rhythm, normal S1 and S2, no murmur/rub/gallop  ABDOMEN: Nontender to palpation, normoactive bowel sounds, no rebound/guarding; No hepatosplenomegaly  MUSCULOSKELETAL:  No lower extremity edema; Peripheral pulses are 2+ bilaterally  PSYCH: A+O to person, place, and time; affect appropriate  NEUROLOGY: no gross motor or sensory deficits   SKIN: No rashes; no palpable lesions    LABS:                        14.4   6.80  )-----------( 220      ( 13 Sep 2022 06:09 )             43.2     09-13    138  |  104  |  15  ----------------------------<  133<H>  4.3   |  26  |  0.79    Ca    9.0      13 Sep 2022 06:09  Phos  3.6     09-12  Mg     1.70     09-12    TPro  6.9  /  Alb  3.9  /  TBili  0.4  /  DBili  x   /  AST  14  /  ALT  14  /  AlkPhos  72  09-12              COVID-19 PCR: NotDetec (09 Sep 2022 15:55)      RADIOLOGY & ADDITIONAL TESTS:  New Results Reviewed Today:   New Imaging Personally Reviewed Today:  New Electrocardiogram Personally Reviewed Today:  Prior or Outpatient Records Reviewed Today:    COMMUNICATION:  Care Discussed with Consultants/Other Providers and Details of Discussion:  Discussions with Patient/Family:  PCP Communication:    
Patient is a 62y old  Male who presents with a chief complaint of Dizziness x1 week (20 Sep 2022 10:45)      SUBJECTIVE / OVERNIGHT EVENTS: Pt seen and examined. S/P RRT in the AM for one episode of VT on Tele monitor, asymptomatic, ? artifact as per EPS consult. Pt still c/o dizziness.     MEDICATIONS  (STANDING):  aspirin enteric coated 81 milliGRAM(s) Oral daily  atorvastatin 20 milliGRAM(s) Oral at bedtime  clopidogrel Tablet 75 milliGRAM(s) Oral daily  dextrose 5%. 1000 milliLiter(s) (50 mL/Hr) IV Continuous <Continuous>  dextrose 5%. 1000 milliLiter(s) (100 mL/Hr) IV Continuous <Continuous>  dextrose 50% Injectable 25 Gram(s) IV Push once  dextrose 50% Injectable 12.5 Gram(s) IV Push once  dextrose 50% Injectable 25 Gram(s) IV Push once  glucagon  Injectable 1 milliGRAM(s) IntraMuscular once  influenza   Vaccine 0.5 milliLiter(s) IntraMuscular once  insulin lispro (ADMELOG) corrective regimen sliding scale   SubCutaneous three times a day before meals  insulin lispro (ADMELOG) corrective regimen sliding scale   SubCutaneous at bedtime  losartan 50 milliGRAM(s) Oral daily  oxybutynin XL 10 milliGRAM(s) Oral every 24 hours  tamsulosin 0.8 milliGRAM(s) Oral at bedtime    MEDICATIONS  (PRN):  acetaminophen     Tablet .. 650 milliGRAM(s) Oral every 6 hours PRN Temp greater or equal to 38C (100.4F), Mild Pain (1 - 3)  aluminum hydroxide/magnesium hydroxide/simethicone Suspension 30 milliLiter(s) Oral every 4 hours PRN Dyspepsia  dextrose Oral Gel 15 Gram(s) Oral once PRN Blood Glucose LESS THAN 70 milliGRAM(s)/deciliter  meclizine 25 milliGRAM(s) Oral every 12 hours PRN Dizziness  melatonin 3 milliGRAM(s) Oral at bedtime PRN Insomnia      Vital Signs Last 24 Hrs  T(C): 36.7 (20 Sep 2022 06:35), Max: 36.8 (19 Sep 2022 18:08)  T(F): 98.1 (20 Sep 2022 06:35), Max: 98.3 (19 Sep 2022 18:08)  HR: 57 (20 Sep 2022 06:35) (57 - 65)  BP: 157/82 (20 Sep 2022 06:35) (152/85 - 157/82)  BP(mean): --  RR: 18 (20 Sep 2022 06:35) (18 - 18)  SpO2: 99% (20 Sep 2022 06:35) (99% - 100%)    Parameters below as of 20 Sep 2022 06:35  Patient On (Oxygen Delivery Method): room air      CAPILLARY BLOOD GLUCOSE      POCT Blood Glucose.: 195 mg/dL (20 Sep 2022 12:22)  POCT Blood Glucose.: 137 mg/dL (20 Sep 2022 09:12)  POCT Blood Glucose.: 155 mg/dL (19 Sep 2022 21:45)  POCT Blood Glucose.: 115 mg/dL (19 Sep 2022 17:45)    I&O's Summary      PHYSICAL EXAM:  GENERAL: NAD, well-developed  HEAD:  Atraumatic, Normocephalic  EYES: EOMI, PERRLA, conjunctiva and sclera clear  NECK: Supple, No JVD  CHEST/LUNG: Clear to auscultation bilaterally; No wheeze  HEART: Regular rate and rhythm; No murmurs, rubs, or gallops  ABDOMEN: Soft, Nontender, Nondistended; Bowel sounds present  EXTREMITIES:  2+ Peripheral Pulses, No clubbing, cyanosis, or edema  PSYCH: AAOx3  NEUROLOGY: non-focal  SKIN: No rashes or lesions    LABS:                        14.1   6.44  )-----------( 246      ( 20 Sep 2022 06:14 )             43.5     09-20    137  |  104  |  16  ----------------------------<  131<H>  4.2   |  23  |  0.77    Ca    9.0      20 Sep 2022 06:14  Phos  3.0     09-20  Mg     1.80     09-20                RADIOLOGY & ADDITIONAL TESTS:    Imaging Personally Reviewed:    Consultant(s) Notes Reviewed:  EPS     Care Discussed with Consultants/Other Providers:
[facility] Division of Hospital Medicine  Sonia Rivers MD  Available via MS Teams  Pager: [phone]    SUBJECTIVE / OVERNIGHT EVENTS:    Had an episode of lightheadedness about 15 minutes after getting up throughout yesterday.   Otherwise, feels well without complaints     REVIEW OF SYSTEMS:    CONSTITUTIONAL: No weakness, fevers or chills  EYES/ENT: No visual changes;  No vertigo or throat pain   NECK: No pain or stiffness  RESPIRATORY: No cough, wheezing, hemoptysis; No shortness of breath  CARDIOVASCULAR: No chest pain or palpitations  GASTROINTESTINAL: No abdominal or epigastric pain. No nausea, vomiting, or hematemesis; No diarrhea or constipation. No melena or hematochezia.  GENITOURINARY: No dysuria, frequency or hematuria  NEUROLOGICAL: No numbness or weakness  SKIN: No itching, rashes    MEDICATIONS  (STANDING):  aspirin enteric coated 81 milliGRAM(s) Oral daily  atorvastatin 20 milliGRAM(s) Oral at bedtime  dextrose 5%. 1000 milliLiter(s) (50 mL/Hr) IV Continuous <Continuous>  dextrose 5%. 1000 milliLiter(s) (100 mL/Hr) IV Continuous <Continuous>  dextrose 50% Injectable 25 Gram(s) IV Push once  dextrose 50% Injectable 12.5 Gram(s) IV Push once  dextrose 50% Injectable 25 Gram(s) IV Push once  glucagon  Injectable 1 milliGRAM(s) IntraMuscular once  influenza   Vaccine 0.5 milliLiter(s) IntraMuscular once  insulin lispro (ADMELOG) corrective regimen sliding scale   SubCutaneous three times a day before meals  insulin lispro (ADMELOG) corrective regimen sliding scale   SubCutaneous at bedtime  losartan 50 milliGRAM(s) Oral daily  oxybutynin XL 10 milliGRAM(s) Oral every 24 hours  tamsulosin 0.8 milliGRAM(s) Oral at bedtime    MEDICATIONS  (PRN):  acetaminophen     Tablet .. 650 milliGRAM(s) Oral every 6 hours PRN Temp greater or equal to 38C (100.4F), Mild Pain (1 - 3)  aluminum hydroxide/magnesium hydroxide/simethicone Suspension 30 milliLiter(s) Oral every 4 hours PRN Dyspepsia  dextrose Oral Gel 15 Gram(s) Oral once PRN Blood Glucose LESS THAN 70 milliGRAM(s)/deciliter  meclizine 25 milliGRAM(s) Oral every 12 hours PRN Dizziness  melatonin 3 milliGRAM(s) Oral at bedtime PRN Insomnia      I&O's Summary    13 Sep 2022 07:01  -  14 Sep 2022 07:00  --------------------------------------------------------  IN: 0 mL / OUT: 1625 mL / NET: -1625 mL        PHYSICAL EXAM:  Vital Signs Last 24 Hrs  T(C): 36.4 (14 Sep 2022 11:31), Max: 36.8 (13 Sep 2022 17:43)  T(F): 97.6 (14 Sep 2022 11:31), Max: 98.2 (13 Sep 2022 17:43)  HR: 76 (14 Sep 2022 11:31) (67 - 84)  BP: 127/68 (14 Sep 2022 11:31) (127/68 - 154/58)  BP(mean): --  RR: 18 (14 Sep 2022 11:31) (18 - 18)  SpO2: 99% (14 Sep 2022 11:31) (97% - 100%)    Parameters below as of 14 Sep 2022 11:31  Patient On (Oxygen Delivery Method): room air      CONSTITUTIONAL: NAD, well-developed  EYES:  conjunctiva and sclera clear  NECK: Supple  RESPIRATORY: Normal respiratory effort; lungs are clear to auscultation bilaterally  CARDIOVASCULAR: Regular rate and rhythm, normal S1 and S2, no murmur/rub/gallop  ABDOMEN: Nontender to palpation, normoactive bowel sounds, no rebound/guarding; No hepatosplenomegaly  MUSCULOSKELETAL:  No lower extremity edema; Peripheral pulses are 2+ bilaterally  PSYCH: A+O to person, place, and time; affect appropriate  NEUROLOGY: no gross motor or sensory deficits   SKIN: No rashes; no palpable lesions    LABS:                        14.0   5.45  )-----------( 227      ( 14 Sep 2022 06:40 )             43.1     09-14    140  |  106  |  13  ----------------------------<  107<H>  4.0   |  24  |  0.75    Ca    8.9      14 Sep 2022 06:40  Phos  3.3     09-14  Mg     1.80     09-14                COVID-19 PCR: NotDetec (09 Sep 2022 15:55)      RADIOLOGY & ADDITIONAL TESTS:  New Results Reviewed Today:   New Imaging Personally Reviewed Today:  New Electrocardiogram Personally Reviewed Today:  Prior or Outpatient Records Reviewed Today:    COMMUNICATION:  Care Discussed with Consultants/Other Providers and Details of Discussion:  Discussions with Patient/Family:  PCP Communication:

## 2022-09-22 NOTE — DISCHARGE NOTE NURSING/CASE MANAGEMENT/SOCIAL WORK - PATIENT PORTAL LINK FT
You can access the FollowMyHealth Patient Portal offered by [facility] by registering at the following website: [email or web address]. By joining [employer]’s FollowMyHealth portal, you will also be able to view your health information using other applications (apps) compatible with our system.

## 2022-09-22 NOTE — PROGRESS NOTE ADULT - PROBLEM SELECTOR PLAN 3
-A1c 6.2  -held home metformin  -sliding scale
-c/w home meds: losartan, tamsulosin  - hold home metop 25 XL due to chris

## 2022-09-22 NOTE — PROGRESS NOTE ADULT - PROBLEM SELECTOR PLAN 6
NSR with T wave inversions in lateral leads similar to previous EKG  -Hx of CAD w/out stent placement  -troponin 38--> 12, w/out chest pain. Not likely ACS  -monitor on tele  -echo completed, no sig findings
NSR with T wave inversions in lateral leads similar to previous EKG  -Hx of CAD w/out stent placement  -troponin 38--> 12, w/out chest pain. Not likely ACS  -monitor on tele  -f/u echo w/ bubble  -possible cardiology consult depending on echo
NSR with T wave inversions in lateral leads similar to previous EKG  -Hx of CAD w/out stent placement  -troponin 38--> 12, w/out chest pain. Not likely ACS  -monitor on tele  -echo completed, no sig findings
NSR with T wave inversions in lateral leads similar to previous EKG  -Hx of CAD w/out stent placement  -troponin 38--> 12, w/out chest pain. Not likely ACS  -monitor on tele  -echo completed, no sig findings
NSR with T wave inversions in lateral leads similar to previous EKG  -Hx of CAD w/out stent placement  -troponin 38--> 12, w/out chest pain. Not likely ACS  -monitor on tele  -f/u echo w/ bubble  -possible cardiology consult depending on echo
-hx of CAD without stent  -c/w aspirin, atorvastatin, metoprolol
NSR with T wave inversions in lateral leads similar to previous EKG  -Hx of CAD w/out stent placement  -troponin 38--> 12, w/out chest pain. Not likely ACS  -monitor on tele  -echo completed, no sig findings
NSR with T wave inversions in lateral leads similar to previous EKG  -Hx of CAD w/out stent placement  -troponin 38--> 12, w/out chest pain. Not likely ACS  -monitor on tele  -echo completed, no sig findings

## 2022-09-22 NOTE — DISCHARGE NOTE NURSING/CASE MANAGEMENT/SOCIAL WORK - NSDCFUADDAPPT_GEN_ALL_CORE_FT
APPTS ARE READY TO BE MADE: [x] YES    Best Family or Patient Contact (if needed): Patient: [phone], Daughter Dre [phone]    Additional Information about above appointments (if needed):  1: Patient needs close Stroke/Neurology followup outpatient within 1-2 weeks, preferably either Dr. Naranjo or Dr. Ojeda: [phone]   2. Patient will need outpatient interval MRA Head/neck with T1 Fat Sat suppression in 3-6 months, and Outpatient MRA NOVA to better characterize hemodynamics    Have a Electrophysiology appt on 10/12/22 at 2pm, at [phone]   										                                Patient was provided with follow up request details for Dr. Lopez and was advised to call to schedule follow up within specified time frame.   									                                           Patient was provided with follow up request details for Dr. Lacho Ojeda/ Dr. Jamie Naranjo and was advised to call to schedule follow up within specified time frame.

## 2022-09-22 NOTE — PROGRESS NOTE ADULT - REASON FOR ADMISSION
Dizziness x1 week

## 2022-09-22 NOTE — PROGRESS NOTE ADULT - PROBLEM SELECTOR PROBLEM 7
BPH (benign prostatic hyperplasia)
CAD (coronary artery disease)

## 2022-09-22 NOTE — PROGRESS NOTE ADULT - PROBLEM SELECTOR PLAN 9
Diet: DASH/TLC  DVT: lovenox 40  Dispo: PT evaluation for ? rehab vs Home with home PT/Outpatient PT
Diet: DASH/TLC  DVT: lovenox 40  Dispo: pending PT/OT eval
Diet: DASH/TLC  DVT: lovenox 40  Dispo: pending PT/OT eval
Diet: DASH/TLC  DVT: lovenox 40  Dispo: Home with outpatient PT as per PT eval.
Diet: DASH/TLC  DVT: lovenox 40  Dispo: pending PT/OT eval
Diet: DASH/TLC  DVT: lovenox 40  Dispo: PT -> home with OP PT
Diet: DASH/TLC  DVT: lovenox 40  Dispo: pending PT/OT eval
Diet: DASH/TLC  DVT: lovenox 40  Dispo: PT evaluation for ? rehab vs Home with home PT/Outpatient PT
Diet: DASH/TLC  DVT: lovenox 40  Dispo: pending PT/OT eval
Diet: DASH/TLC  DVT: lovenox 40  Dispo: PT evaluation for ? rehab vs Home with home PT/Outpatient PT

## 2022-09-22 NOTE — PROGRESS NOTE ADULT - PROBLEM SELECTOR PROBLEM 2
Bradycardia
Hypertension, unspecified type
Bradycardia

## 2022-09-22 NOTE — PROGRESS NOTE ADULT - PROBLEM SELECTOR PLAN 8
-hx of BPH  -c/w tamsulosin and oxybutynin
Diet: DASH/TLC  DVT: lovenox 40  Dispo: pending PT/OT eval
-hx of BPH  -c/w tamsulosin and oxybutynin

## 2022-09-22 NOTE — PROGRESS NOTE ADULT - PROBLEM SELECTOR PROBLEM 6
Abnormal EKG
CAD (coronary artery disease)
Abnormal EKG

## 2022-09-22 NOTE — PROGRESS NOTE ADULT - PROBLEM SELECTOR PLAN 7
-hx of CAD without stent  -c/w aspirin, atorvastatin, metoprolol
-hx of CAD without stent  -c/w aspirin, atorvastatin  - hold metoprolol due to bady
-hx of CAD without stent  -c/w aspirin, atorvastatin  - hold metoprolol due to chris
-hx of CAD without stent  -c/w aspirin, atorvastatin, metoprolol
-hx of CAD without stent  -c/w aspirin, atorvastatin  - hold metoprolol due to bady
-hx of CAD without stent  -c/w aspirin, atorvastatin  - hold metoprolol due to bady
-hx of CAD without stent  -c/w aspirin, atorvastatin  - hold metoprolol due to chris
-hx of CAD without stent  -c/w aspirin, atorvastatin  - hold metoprolol due to chris
-hx of CAD without stent  -c/w aspirin, atorvastatin, metoprolol
-hx of CAD without stent  -c/w aspirin, atorvastatin, metoprolol
-hx of CAD without stent  -c/w aspirin, atorvastatin  - hold metoprolol due to chris
-hx of BPH  -c/w tamsulosin and oxybutynin
-hx of CAD without stent  -c/w aspirin, atorvastatin  - hold metoprolol due to chris

## 2022-09-22 NOTE — PROGRESS NOTE ADULT - PROBLEM SELECTOR PLAN 4
-A1c 6.2  -held home metformin  -sliding scale
-c/w home meds: atorvastatin 20 mg  -f/u lipid panel
-A1c 6.2  -held home metformin  -sliding scale

## 2022-09-22 NOTE — PROGRESS NOTE ADULT - PROBLEM SELECTOR PROBLEM 4
Diabetes mellitus
HLD (hyperlipidemia)
Diabetes mellitus

## 2022-09-22 NOTE — PROGRESS NOTE ADULT - PROBLEM SELECTOR PLAN 2
- asymptomatic   - noted to be on metop 25 XL   - EKG with sinus chris, with TWI that are stable from prior EKGs  - hold metop   - now HR is WNL. Keep holding metop on DC  -EPS rec ILR, pt agreeable for ILR   -ILR today
- asymptomatic   - noted to be on metop 25 XL   - EKG with sinus chris, with TWI that are stable from prior EKGs  - hold metop   - now HR is WNL
- asymptomatic   - noted to be on metop 25 XL   - EKG with sinus chris, with TWI that are stable from prior EKGs  - hold metop   - now HR is WNL
- asymptomatic   - noted to be on metop 25 XL   - EKG with sinus chris, with TWI that are stable from prior EKGs  - hold metop   - now HR is WNL. Keep holding metop on DC
-bp elevated 152/78  -c/w home meds: losartan, tamsulosin, metoprolol
- asymptomatic   - noted to be on metop 25 XL   - EKG with sinus chris, with TWI that are stable from prior EKGs  - hold metop   - now HR is WNL. Keep holding metop on DC
- asymptomatic   - noted to be on metop 25 XL   - EKG with sinus chris, with TWI that are stable from prior EKGs  - hold metop   - now HR is WNL. Keep holding metop on DC  -EPS rec ILR, pt agreeable for ILR   -S/P ILR  -Outpatient f/u with EPS
- asymptomatic   - noted to be on metop 25 XL   - EKG with sinus chris, with TWI that are stable from prior EKGs  - hold metop   - place pads   - atropine and glucagon at bedside   - metop should wash out by tomorrow
- asymptomatic   - noted to be on metop 25 XL   - EKG with sinus chris, with TWI that are stable from prior EKGs  - hold metop   - now HR is WNL. Keep holding metop on DC
- asymptomatic   - noted to be on metop 25 XL   - EKG with sinus chris, with TWI that are stable from prior EKGs  - hold metop   - now HR is WNL. Keep holding metop on DC  -EPS rec ILR, pt agreeable for ILR   -Contact EP re: ILR in AM
- asymptomatic   - noted to be on metop 25 XL   - EKG with sinus chris, with TWI that are stable from prior EKGs  - hold metop   - now HR is WNL. Keep holding metop on DC

## 2022-09-22 NOTE — PROGRESS NOTE ADULT - NSPROGADDITIONALINFOA_GEN_ALL_CORE
D/W ACP
Spoke to pt at length about f/u issues, he understood and agreed with the plan.  Total time: 35min  D/W ACP
Possible d/c later today after ILR placement.   D/W ACP  Total time: 35min
D/W ACP

## 2022-09-22 NOTE — PROGRESS NOTE ADULT - ASSESSMENT
61 yo man with 1 week of dizziness - he describes a "heaviness" in the head.  It is worse when he stands up and better when lying down.  Minimal worsening with head movement.  It has been better when he first wakes up and worse when he gets up and is active for a while.  The neuro ROS is otherwise negative. CTH with volume loss in cerebellar vermi & hemispheres b/l. MRI brain w/o contrast demonstrates acute infarct of the R cerebellum. MR C spine shows mild degenerative changes & mild cervical canal stenosis. TTE w/ bubble shows EF of 60%, inconclusive bubble study. Seen on 9/15 by stroke team and discussed results. Patient still with persisting, positional vertigo. Exam as above.     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 Require vessel imaging for further evaluation.    Recommendations    [] Pending CTA H/N w/ IV contrast   [] +/- ILR depending on the results of vessel imaging. If vessel imaging without moderate (>50%) stenosis in posterior circulation, then requires ILR.   [] C/w ASA 81 mg indefinitely  [] C/w Plavix 75 mg for at least 3 weeks, pending vessel imaging. If there is evidence of severe stenosis (>70%) in the intracranial vertebral or basilar arteries then increase course to 3 months.   [] C/w home Atorvastatin 20 mg, titrate to LDL < 70 (currently 60)  [] Chemical DVT prophylaxis while inpatient  [] Gradual normotension, avoid hypotension  [] PT/OT - outpatient PT/Vestibular rehab at [facility]   [] C/w DASH diet as tolerated  [] HgA1C goals < 7.0   [] Rest of workup as per primary team  [] Upon discharge, patient should follow up with Dr. Ojeda:  [phone] [address] [place] [address]     Patient case discussed and seen with stroke attending, Dr. Ojeda.    Please call with questions: [id number] 
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  He was found to have subacute CVA. Telemetry recorded SR with HR 60s-90s. S/P ILR placement yesterday.  ILR site  was covered with a  clear with a gauge and clear Tegaderm. It was clean, dry, and intact. No bleeding, drainage hematoma, or ecchymosis appreciated.      s/p ILR implantation  Post-op ILR instruction has been verbally explained and given to the patient.  Patient expressed understanding and all questions were answered. A carbon copy is located in the patient chart  Patient is scheduled for an appointment on 10/12/22 @ 2pm  You can return to normal activities 24hours after the procedure   No scrubbing the incision site  No lotion, ointment, powder or direct sunlight to the incision site for 2 weeks   No swimming pool, Jacuzzi, or bath for 7 days.   Patient can shower 24 hours after procedure. Pat the area dry  Pt was instructed to call [phone] if the following occurs:      - fever with temperature > 101F      - swelling, drainage or bleeding at the site incision   May d/c home from EP standpoint  Ep will sign off    
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  He was found to have subacute CVA. Telemetry recorded SR with HR 60s-80s. Tele monitor recorded episodes of artifacts recorded as VT. Patient consented for ILR placement for long term monitoring of atrial arrhythmias in the setting of CVA.   Echo normal LVEF.   - Plan for ILR placement today  -Continue care per neuro/primary team 
Patient is a 61 yo M with pmhx of HTN, HLD, DM on metformin, CAD, BPH, remote hx CVA who comes in with complaints of dizziness for 1 week. 
Patient is a 61 yo M with pmhx of HTN, HLD, DM on metformin, CAD, BPH, remote hx CVA who comes in with complaints of dizziness for 1 week. 
Patient is a 63 yo M with pmhx of HTN, HLD, DM on metformin, CAD, BPH, remote hx CVA who comes in with complaints of dizziness for 1 week. 
Patient is a 61 yo M with pmhx of HTN, HLD, DM on metformin, CAD, BPH, remote hx CVA who comes in with complaints of dizziness for 1 week. 
Patient is a 63 yo M with pmhx of HTN, HLD, DM on metformin, CAD, BPH, remote hx CVA who comes in with complaints of dizziness for 1 week. 
Patient is a 61 yo M with pmhx of HTN, HLD, DM on metformin, CAD, BPH, remote hx CVA who comes in with complaints of dizziness for 1 week. 
Patient is a 61 yo M with pmhx of HTN, HLD, DM on metformin, CAD, BPH, remote hx CVA who comes in with complaints of dizziness for 1 week. 
Patient is a 63 yo M with pmhx of HTN, HLD, DM on metformin, CAD, BPH, remote hx CVA who comes in with complaints of dizziness for 1 week. 
Patient is a 63 yo M with pmhx of HTN, HLD, DM on metformin, CAD, BPH, remote hx CVA who comes in with complaints of dizziness for 1 week. 
Patient is a 61 yo M with pmhx of HTN, HLD, DM on metformin, CAD, BPH, remote hx CVA who comes in with complaints of dizziness for 1 week. 
Patient is a 63 yo M with pmhx of HTN, HLD, DM on metformin, CAD, BPH, remote hx CVA who comes in with complaints of dizziness for 1 week. 
Patient is a 63 yo M with pmhx of HTN, HLD, DM on metformin, CAD, BPH, remote hx CVA who comes in with complaints of dizziness for 1 week. 
Patient is a 61 yo M with pmhx of HTN, HLD, DM on metformin, CAD, BPH, remote hx CVA who comes in with complaints of dizziness for 1 week.

## 2022-09-22 NOTE — PROGRESS NOTE ADULT - PROBLEM SELECTOR PROBLEM 8
BPH (benign prostatic hyperplasia)
Prophylactic measure
BPH (benign prostatic hyperplasia)
[FreeTextEntry1] : Not exercising much.\par Eating out more due to home construction.\par Asked about her use of aspirin.\par Diastolic HTN 80-90 seen at home.\par \par Prior:\par Dear Denise,\par Thank you for referring her for cardiovascular evaluation.  She is a 72-year-old with a history of right-sided breast cancer who was seen in the emergency room for chest pain.  She describes a grasping pectoral chest tightness that happens in a crescendo decrescendo pattern lasting hours at a time.  There are no associated symptoms during these episodes.\par She has some increased stress at home but denies any exertional chest pain or shortness of breath.  She is able to do her usual activities of walking and ameena chi without chest discomfort.\par ER work-up reported normal labs including negative troponins.\par She denies any personal history of coronary artery disease, diabetes mellitus, hypercholesterolemia, smoking but her mother did have cardiac issues in her 40s.\par She did have radiation therapy to her right chest.
BPH (benign prostatic hyperplasia)

## 2022-09-22 NOTE — CHART NOTE - NSCHARTNOTEFT_GEN_A_CORE
On 9/22/2022, results and case was discussed with Dr. Calhoun, patient is medically cleared and optimized for discharge today.   ILR site with clear with a clear Tegaderm, clean, dry, and intact, cleared by EP for discharge, followup EP appt on 10/12 at 2pm. All medications were reviewed with attending, and sent to mutually agreed upon pharmacy> continue aspirin/statin, plavix [id number] total per neuro recs, continue current meclizine short-term, current flomax/oxybutynin, continue home metformin, losartan, HOLD Metoprolol. Patient states he has all home meds, spoke with [facility] ([phone])> all meds covered with no copay. Neuro followup emailed, patient to follow up next week with PCP Dr. Tanner Lopez. Patient discharged home via private transport with daughter, Rx for outpatient PT given, all questions answered.

## 2022-09-22 NOTE — PROGRESS NOTE ADULT - PROBLEM SELECTOR PLAN 5
-c/w home meds: atorvastatin 20 mg  -f/u lipid panel
NSR with T wave inversions in lateral leads similar to previous EKG  -Hx of CAD w/out stent placement  -troponin 38--> 12, w/out chest pain. Not likely ACS  -monitor on tele  -f/u echo w/ bubble  -possible cardiology consult in the AM depending on echo
-c/w home meds: atorvastatin 20 mg  -f/u lipid panel

## 2022-09-22 NOTE — PROGRESS NOTE ADULT - PROBLEM SELECTOR PROBLEM 1
Dizziness

## 2022-09-23 NOTE — PATIENT PROFILE ADULT. - TEACHING/LEARNING FACTORS INFLUENCE READINESS TO LEARN
Current Medications     Dosage   lisinopril (ZESTRIL) 5 MG tablet TAKE ONE TABLET BY MOUTH DAILY   donepezil (ARICEPT) 10 MG tablet TAKE ONE TABLET BY MOUTH EVERY NIGHT AT BEDTIME   ibuprofen (MOTRIN) 200 MG tablet Take 200 mg by mouth every 6 hours as needed for Pain.   Cholecalciferol (VITAMIN D) 2000 UNITS CAPS Take  by mouth.   acetaminophen (TYLENOL) 325 MG tablet Take 325 mg by mouth every 4 hours as needed.    Multiple Vitamins-Minerals (CENTRUM SILVER) tablet Take 1 tablet by mouth daily.     Jaquelnie, patients daughter is not currently listed as a person authorized to discuss patients medication information. Received permission from patient/patients husbands to discuss and add Jaqueline to patients demographic information.    Spoke with Jaqueline about above medications and supplements. She verbalized understanding and agreeable. No other questions. Encouraged the patient to call our office with any questions or concerns.    JG Correa RN    
Jaqueline is pt's DTR, she is with the pt, and would like to speak to the nurse.  She needs assistance with each medication pt is taking.  She wants to be sure her mother is taking the correct amounts of each Rx, please call.  
none

## 2022-10-12 ENCOUNTER — APPOINTMENT (OUTPATIENT)
Dept: ELECTROPHYSIOLOGY | Facility: CLINIC | Age: 62
End: 2022-10-12

## 2022-10-27 ENCOUNTER — FORM ENCOUNTER (OUTPATIENT)
Age: 62
End: 2022-10-27

## 2022-11-11 ENCOUNTER — APPOINTMENT (OUTPATIENT)
Dept: ELECTROPHYSIOLOGY | Facility: CLINIC | Age: 62
End: 2022-11-11

## 2022-11-11 DIAGNOSIS — I10 ESSENTIAL (PRIMARY) HYPERTENSION: ICD-10-CM

## 2022-11-11 DIAGNOSIS — Z87.891 PERSONAL HISTORY OF NICOTINE DEPENDENCE: ICD-10-CM

## 2022-11-11 DIAGNOSIS — I25.10 ATHEROSCLEROTIC HEART DISEASE OF NATIVE CORONARY ARTERY W/OUT ANGINA PECTORIS: ICD-10-CM

## 2022-11-11 DIAGNOSIS — E78.5 HYPERLIPIDEMIA, UNSPECIFIED: ICD-10-CM

## 2022-11-11 DIAGNOSIS — I63.9 CEREBRAL INFARCTION, UNSPECIFIED: ICD-10-CM

## 2022-11-11 DIAGNOSIS — E11.9 TYPE 2 DIABETES MELLITUS W/OUT COMPLICATIONS: ICD-10-CM

## 2022-11-11 PROCEDURE — 93285 PRGRMG DEV EVAL SCRMS IP: CPT

## 2022-11-11 RX ORDER — METFORMIN ER 750 MG 750 MG/1
750 TABLET ORAL
Refills: 0 | Status: ACTIVE | COMMUNITY

## 2022-11-11 RX ORDER — METOPROLOL SUCCINATE 25 MG/1
25 TABLET, EXTENDED RELEASE ORAL
Refills: 0 | Status: ACTIVE | COMMUNITY

## 2022-11-11 RX ORDER — ASPIRIN ENTERIC COATED TABLETS 81 MG 81 MG/1
81 TABLET, DELAYED RELEASE ORAL
Refills: 0 | Status: ACTIVE | COMMUNITY

## 2022-11-11 RX ORDER — LOSARTAN POTASSIUM 50 MG/1
50 TABLET, FILM COATED ORAL
Refills: 0 | Status: ACTIVE | COMMUNITY

## 2022-11-11 RX ORDER — ATORVASTATIN CALCIUM 20 MG/1
20 TABLET, FILM COATED ORAL
Refills: 0 | Status: ACTIVE | COMMUNITY

## 2022-11-11 RX ORDER — ERGOCALCIFEROL 1.25 MG/1
1.25 MG CAPSULE, LIQUID FILLED ORAL
Refills: 0 | Status: ACTIVE | COMMUNITY

## 2022-11-11 RX ORDER — TAMSULOSIN HYDROCHLORIDE 0.4 MG/1
0.4 CAPSULE ORAL
Refills: 0 | Status: ACTIVE | COMMUNITY

## 2022-12-14 ENCOUNTER — NON-APPOINTMENT (OUTPATIENT)
Age: 62
End: 2022-12-14

## 2022-12-14 ENCOUNTER — APPOINTMENT (OUTPATIENT)
Dept: ELECTROPHYSIOLOGY | Facility: CLINIC | Age: 62
End: 2022-12-14

## 2022-12-14 PROCEDURE — 93298 REM INTERROG DEV EVAL SCRMS: CPT

## 2022-12-14 PROCEDURE — G2066: CPT

## 2023-01-19 ENCOUNTER — NON-APPOINTMENT (OUTPATIENT)
Age: 63
End: 2023-01-19

## 2023-01-19 ENCOUNTER — APPOINTMENT (OUTPATIENT)
Dept: ELECTROPHYSIOLOGY | Facility: CLINIC | Age: 63
End: 2023-01-19
Payer: MEDICAID

## 2023-01-19 PROCEDURE — G2066: CPT

## 2023-01-19 PROCEDURE — 93298 REM INTERROG DEV EVAL SCRMS: CPT

## 2023-02-23 ENCOUNTER — APPOINTMENT (OUTPATIENT)
Dept: ELECTROPHYSIOLOGY | Facility: CLINIC | Age: 63
End: 2023-02-23
Payer: MEDICAID

## 2023-02-23 ENCOUNTER — NON-APPOINTMENT (OUTPATIENT)
Age: 63
End: 2023-02-23

## 2023-02-23 PROCEDURE — G2066: CPT

## 2023-02-23 PROCEDURE — 93298 REM INTERROG DEV EVAL SCRMS: CPT

## 2023-03-29 ENCOUNTER — APPOINTMENT (OUTPATIENT)
Dept: ELECTROPHYSIOLOGY | Facility: CLINIC | Age: 63
End: 2023-03-29

## 2023-03-29 ENCOUNTER — FORM ENCOUNTER (OUTPATIENT)
Age: 63
End: 2023-03-29

## 2023-05-03 ENCOUNTER — APPOINTMENT (OUTPATIENT)
Dept: ELECTROPHYSIOLOGY | Facility: CLINIC | Age: 63
End: 2023-05-03
Payer: MEDICAID

## 2023-05-03 ENCOUNTER — NON-APPOINTMENT (OUTPATIENT)
Age: 63
End: 2023-05-03

## 2023-05-03 PROCEDURE — G2066: CPT

## 2023-05-03 PROCEDURE — 93298 REM INTERROG DEV EVAL SCRMS: CPT

## 2023-06-07 ENCOUNTER — NON-APPOINTMENT (OUTPATIENT)
Age: 63
End: 2023-06-07

## 2023-06-07 ENCOUNTER — APPOINTMENT (OUTPATIENT)
Dept: ELECTROPHYSIOLOGY | Facility: CLINIC | Age: 63
End: 2023-06-07
Payer: MEDICAID

## 2023-06-07 PROCEDURE — 93298 REM INTERROG DEV EVAL SCRMS: CPT

## 2023-06-07 PROCEDURE — G2066: CPT

## 2023-07-11 ENCOUNTER — FORM ENCOUNTER (OUTPATIENT)
Age: 63
End: 2023-07-11

## 2023-07-12 ENCOUNTER — APPOINTMENT (OUTPATIENT)
Dept: ELECTROPHYSIOLOGY | Facility: CLINIC | Age: 63
End: 2023-07-12

## 2023-08-15 ENCOUNTER — NON-APPOINTMENT (OUTPATIENT)
Age: 63
End: 2023-08-15

## 2023-08-15 ENCOUNTER — APPOINTMENT (OUTPATIENT)
Dept: ELECTROPHYSIOLOGY | Facility: CLINIC | Age: 63
End: 2023-08-15
Payer: MEDICAID

## 2023-08-15 PROCEDURE — G2066: CPT

## 2023-08-15 PROCEDURE — 93298 REM INTERROG DEV EVAL SCRMS: CPT

## 2023-08-24 ENCOUNTER — INPATIENT (INPATIENT)
Facility: HOSPITAL | Age: 63
LOS: 0 days | Discharge: HOME | End: 2023-08-25
Attending: HOSPITALIST | Admitting: HOSPITALIST
Payer: COMMERCIAL

## 2023-08-24 VITALS
OXYGEN SATURATION: 97 % | RESPIRATION RATE: 18 BRPM | HEART RATE: 66 BPM | DIASTOLIC BLOOD PRESSURE: 65 MMHG | TEMPERATURE: 98 F | SYSTOLIC BLOOD PRESSURE: 103 MMHG

## 2023-08-24 DIAGNOSIS — N17.9 ACUTE KIDNEY FAILURE, UNSPECIFIED: ICD-10-CM

## 2023-08-24 DIAGNOSIS — J18.9 PNEUMONIA, UNSPECIFIED ORGANISM: ICD-10-CM

## 2023-08-24 DIAGNOSIS — E78.5 HYPERLIPIDEMIA, UNSPECIFIED: ICD-10-CM

## 2023-08-24 DIAGNOSIS — E11.9 TYPE 2 DIABETES MELLITUS WITHOUT COMPLICATIONS: ICD-10-CM

## 2023-08-24 DIAGNOSIS — R55 SYNCOPE AND COLLAPSE: ICD-10-CM

## 2023-08-24 DIAGNOSIS — Z86.73 PERSONAL HISTORY OF TRANSIENT ISCHEMIC ATTACK (TIA), AND CEREBRAL INFARCTION WITHOUT RESIDUAL DEFICITS: ICD-10-CM

## 2023-08-24 DIAGNOSIS — Z29.9 ENCOUNTER FOR PROPHYLACTIC MEASURES, UNSPECIFIED: ICD-10-CM

## 2023-08-24 DIAGNOSIS — E87.1 HYPO-OSMOLALITY AND HYPONATREMIA: ICD-10-CM

## 2023-08-24 DIAGNOSIS — R05.9 COUGH, UNSPECIFIED: ICD-10-CM

## 2023-08-24 DIAGNOSIS — I10 ESSENTIAL (PRIMARY) HYPERTENSION: ICD-10-CM

## 2023-08-24 LAB
ALBUMIN SERPL ELPH-MCNC: 3.6 G/DL — SIGNIFICANT CHANGE UP (ref 3.3–5)
ALP SERPL-CCNC: 67 U/L — SIGNIFICANT CHANGE UP (ref 40–120)
ALT FLD-CCNC: 12 U/L — SIGNIFICANT CHANGE UP (ref 4–41)
ANION GAP SERPL CALC-SCNC: 10 MMOL/L — SIGNIFICANT CHANGE UP (ref 7–14)
ANISOCYTOSIS BLD QL: SLIGHT — SIGNIFICANT CHANGE UP
APTT BLD: 31 SEC — SIGNIFICANT CHANGE UP (ref 24.5–35.6)
AST SERPL-CCNC: 9 U/L — SIGNIFICANT CHANGE UP (ref 4–40)
B PERT DNA SPEC QL NAA+PROBE: SIGNIFICANT CHANGE UP
B PERT+PARAPERT DNA PNL SPEC NAA+PROBE: SIGNIFICANT CHANGE UP
BASE EXCESS BLDV CALC-SCNC: 0.8 MMOL/L — SIGNIFICANT CHANGE UP (ref -2–3)
BASOPHILS # BLD AUTO: 0 K/UL — SIGNIFICANT CHANGE UP (ref 0–0.2)
BASOPHILS NFR BLD AUTO: 0 % — SIGNIFICANT CHANGE UP (ref 0–2)
BILIRUB SERPL-MCNC: 0.6 MG/DL — SIGNIFICANT CHANGE UP (ref 0.2–1.2)
BLOOD GAS VENOUS COMPREHENSIVE RESULT: SIGNIFICANT CHANGE UP
BORDETELLA PARAPERTUSSIS (RAPRVP): SIGNIFICANT CHANGE UP
BUN SERPL-MCNC: 20 MG/DL — SIGNIFICANT CHANGE UP (ref 7–23)
C PNEUM DNA SPEC QL NAA+PROBE: SIGNIFICANT CHANGE UP
CALCIUM SERPL-MCNC: 9 MG/DL — SIGNIFICANT CHANGE UP (ref 8.4–10.5)
CHLORIDE BLDV-SCNC: 98 MMOL/L — SIGNIFICANT CHANGE UP (ref 96–108)
CHLORIDE SERPL-SCNC: 96 MMOL/L — LOW (ref 98–107)
CO2 BLDV-SCNC: 28.6 MMOL/L — HIGH (ref 22–26)
CO2 SERPL-SCNC: 24 MMOL/L — SIGNIFICANT CHANGE UP (ref 22–31)
CREAT SERPL-MCNC: 1.3 MG/DL — SIGNIFICANT CHANGE UP (ref 0.5–1.3)
EGFR: 62 ML/MIN/1.73M2 — SIGNIFICANT CHANGE UP
EOSINOPHIL # BLD AUTO: 0 K/UL — SIGNIFICANT CHANGE UP (ref 0–0.5)
EOSINOPHIL NFR BLD AUTO: 0 % — SIGNIFICANT CHANGE UP (ref 0–6)
FLUAV SUBTYP SPEC NAA+PROBE: SIGNIFICANT CHANGE UP
FLUBV RNA SPEC QL NAA+PROBE: SIGNIFICANT CHANGE UP
GAS PNL BLDV: 128 MMOL/L — LOW (ref 136–145)
GLUCOSE BLDC GLUCOMTR-MCNC: 104 MG/DL — HIGH (ref 70–99)
GLUCOSE BLDC GLUCOMTR-MCNC: 116 MG/DL — HIGH (ref 70–99)
GLUCOSE BLDC GLUCOMTR-MCNC: 122 MG/DL — HIGH (ref 70–99)
GLUCOSE BLDC GLUCOMTR-MCNC: 97 MG/DL — SIGNIFICANT CHANGE UP (ref 70–99)
GLUCOSE BLDV-MCNC: 138 MG/DL — HIGH (ref 70–99)
GLUCOSE SERPL-MCNC: 130 MG/DL — HIGH (ref 70–99)
HADV DNA SPEC QL NAA+PROBE: SIGNIFICANT CHANGE UP
HCO3 BLDV-SCNC: 27 MMOL/L — SIGNIFICANT CHANGE UP (ref 22–29)
HCOV 229E RNA SPEC QL NAA+PROBE: SIGNIFICANT CHANGE UP
HCOV HKU1 RNA SPEC QL NAA+PROBE: SIGNIFICANT CHANGE UP
HCOV NL63 RNA SPEC QL NAA+PROBE: SIGNIFICANT CHANGE UP
HCOV OC43 RNA SPEC QL NAA+PROBE: DETECTED
HCT VFR BLD CALC: 36.4 % — LOW (ref 39–50)
HCT VFR BLDA CALC: 39 % — SIGNIFICANT CHANGE UP (ref 39–51)
HGB BLD CALC-MCNC: 13 G/DL — SIGNIFICANT CHANGE UP (ref 12.6–17.4)
HGB BLD-MCNC: 11.7 G/DL — LOW (ref 13–17)
HMPV RNA SPEC QL NAA+PROBE: SIGNIFICANT CHANGE UP
HPIV1 RNA SPEC QL NAA+PROBE: SIGNIFICANT CHANGE UP
HPIV2 RNA SPEC QL NAA+PROBE: SIGNIFICANT CHANGE UP
HPIV3 RNA SPEC QL NAA+PROBE: SIGNIFICANT CHANGE UP
HPIV4 RNA SPEC QL NAA+PROBE: SIGNIFICANT CHANGE UP
IANC: 11.3 K/UL — HIGH (ref 1.8–7.4)
INR BLD: 1.19 RATIO — HIGH (ref 0.85–1.18)
LACTATE BLDV-MCNC: 2.1 MMOL/L — HIGH (ref 0.5–2)
LYMPHOCYTES # BLD AUTO: 0.81 K/UL — LOW (ref 1–3.3)
LYMPHOCYTES # BLD AUTO: 5.2 % — LOW (ref 13–44)
M PNEUMO DNA SPEC QL NAA+PROBE: SIGNIFICANT CHANGE UP
MAGNESIUM SERPL-MCNC: 1.9 MG/DL — SIGNIFICANT CHANGE UP (ref 1.6–2.6)
MANUAL SMEAR VERIFICATION: SIGNIFICANT CHANGE UP
MCHC RBC-ENTMCNC: 24.6 PG — LOW (ref 27–34)
MCHC RBC-ENTMCNC: 32.1 GM/DL — SIGNIFICANT CHANGE UP (ref 32–36)
MCV RBC AUTO: 76.5 FL — LOW (ref 80–100)
MICROCYTES BLD QL: SIGNIFICANT CHANGE UP
MONOCYTES # BLD AUTO: 1.77 K/UL — HIGH (ref 0–0.9)
MONOCYTES NFR BLD AUTO: 11.3 % — SIGNIFICANT CHANGE UP (ref 2–14)
NEUTROPHILS # BLD AUTO: 11.85 K/UL — HIGH (ref 1.8–7.4)
NEUTROPHILS NFR BLD AUTO: 73.1 % — SIGNIFICANT CHANGE UP (ref 43–77)
NEUTS BAND # BLD: 2.6 % — SIGNIFICANT CHANGE UP (ref 0–6)
OSMOLALITY UR: 354 MOSM/KG — SIGNIFICANT CHANGE UP (ref 50–1200)
OVALOCYTES BLD QL SMEAR: SLIGHT — SIGNIFICANT CHANGE UP
PCO2 BLDV: 49 MMHG — SIGNIFICANT CHANGE UP (ref 42–55)
PH BLDV: 7.35 — SIGNIFICANT CHANGE UP (ref 7.32–7.43)
PLAT MORPH BLD: NORMAL — SIGNIFICANT CHANGE UP
PLATELET # BLD AUTO: 375 K/UL — SIGNIFICANT CHANGE UP (ref 150–400)
PLATELET COUNT - ESTIMATE: NORMAL — SIGNIFICANT CHANGE UP
PO2 BLDV: <20 MMHG — LOW (ref 25–45)
POIKILOCYTOSIS BLD QL AUTO: SLIGHT — SIGNIFICANT CHANGE UP
POLYCHROMASIA BLD QL SMEAR: SLIGHT — SIGNIFICANT CHANGE UP
POTASSIUM BLDV-SCNC: 5.5 MMOL/L — HIGH (ref 3.5–5.1)
POTASSIUM SERPL-MCNC: 5.1 MMOL/L — SIGNIFICANT CHANGE UP (ref 3.5–5.3)
POTASSIUM SERPL-SCNC: 5.1 MMOL/L — SIGNIFICANT CHANGE UP (ref 3.5–5.3)
PROT SERPL-MCNC: 7.5 G/DL — SIGNIFICANT CHANGE UP (ref 6–8.3)
PROTHROM AB SERPL-ACNC: 13.4 SEC — HIGH (ref 9.5–13)
RAPID RVP RESULT: DETECTED
RBC # BLD: 4.76 M/UL — SIGNIFICANT CHANGE UP (ref 4.2–5.8)
RBC # FLD: 13.5 % — SIGNIFICANT CHANGE UP (ref 10.3–14.5)
RBC BLD AUTO: NORMAL — SIGNIFICANT CHANGE UP
RSV RNA SPEC QL NAA+PROBE: SIGNIFICANT CHANGE UP
RV+EV RNA SPEC QL NAA+PROBE: SIGNIFICANT CHANGE UP
SAO2 % BLDV: 20.2 % — LOW (ref 67–88)
SARS-COV-2 RNA SPEC QL NAA+PROBE: SIGNIFICANT CHANGE UP
SODIUM SERPL-SCNC: 130 MMOL/L — LOW (ref 135–145)
SODIUM UR-SCNC: 30 MMOL/L — SIGNIFICANT CHANGE UP
TROPONIN T, HIGH SENSITIVITY RESULT: 19 NG/L — SIGNIFICANT CHANGE UP
TROPONIN T, HIGH SENSITIVITY RESULT: 19 NG/L — SIGNIFICANT CHANGE UP
VARIANT LYMPHS # BLD: 7.8 % — HIGH (ref 0–6)
WBC # BLD: 15.66 K/UL — HIGH (ref 3.8–10.5)
WBC # FLD AUTO: 15.66 K/UL — HIGH (ref 3.8–10.5)

## 2023-08-24 PROCEDURE — 71046 X-RAY EXAM CHEST 2 VIEWS: CPT | Mod: 26

## 2023-08-24 PROCEDURE — 99223 1ST HOSP IP/OBS HIGH 75: CPT | Mod: GC

## 2023-08-24 PROCEDURE — 99285 EMERGENCY DEPT VISIT HI MDM: CPT

## 2023-08-24 RX ORDER — CEFTRIAXONE 500 MG/1
1000 INJECTION, POWDER, FOR SOLUTION INTRAMUSCULAR; INTRAVENOUS ONCE
Refills: 0 | Status: DISCONTINUED | OUTPATIENT
Start: 2023-08-24 | End: 2023-08-24

## 2023-08-24 RX ORDER — SODIUM CHLORIDE 9 MG/ML
1000 INJECTION, SOLUTION INTRAVENOUS ONCE
Refills: 0 | Status: COMPLETED | OUTPATIENT
Start: 2023-08-24 | End: 2023-08-24

## 2023-08-24 RX ORDER — ACETAMINOPHEN 500 MG
650 TABLET ORAL EVERY 6 HOURS
Refills: 0 | Status: DISCONTINUED | OUTPATIENT
Start: 2023-08-24 | End: 2023-08-25

## 2023-08-24 RX ORDER — AZITHROMYCIN 500 MG/1
500 TABLET, FILM COATED ORAL ONCE
Refills: 0 | Status: COMPLETED | OUTPATIENT
Start: 2023-08-24 | End: 2023-08-24

## 2023-08-24 RX ORDER — DEXTROSE 50 % IN WATER 50 %
25 SYRINGE (ML) INTRAVENOUS ONCE
Refills: 0 | Status: DISCONTINUED | OUTPATIENT
Start: 2023-08-24 | End: 2023-08-25

## 2023-08-24 RX ORDER — DEXTROSE 50 % IN WATER 50 %
15 SYRINGE (ML) INTRAVENOUS ONCE
Refills: 0 | Status: DISCONTINUED | OUTPATIENT
Start: 2023-08-24 | End: 2023-08-25

## 2023-08-24 RX ORDER — LANOLIN ALCOHOL/MO/W.PET/CERES
3 CREAM (GRAM) TOPICAL AT BEDTIME
Refills: 0 | Status: DISCONTINUED | OUTPATIENT
Start: 2023-08-24 | End: 2023-08-25

## 2023-08-24 RX ORDER — ASPIRIN/CALCIUM CARB/MAGNESIUM 324 MG
81 TABLET ORAL DAILY
Refills: 0 | Status: DISCONTINUED | OUTPATIENT
Start: 2023-08-24 | End: 2023-08-25

## 2023-08-24 RX ORDER — AZITHROMYCIN 500 MG/1
500 TABLET, FILM COATED ORAL EVERY 24 HOURS
Refills: 0 | Status: DISCONTINUED | OUTPATIENT
Start: 2023-08-25 | End: 2023-08-25

## 2023-08-24 RX ORDER — GLUCAGON INJECTION, SOLUTION 0.5 MG/.1ML
1 INJECTION, SOLUTION SUBCUTANEOUS ONCE
Refills: 0 | Status: DISCONTINUED | OUTPATIENT
Start: 2023-08-24 | End: 2023-08-25

## 2023-08-24 RX ORDER — AZITHROMYCIN 500 MG/1
TABLET, FILM COATED ORAL
Refills: 0 | Status: DISCONTINUED | OUTPATIENT
Start: 2023-08-24 | End: 2023-08-25

## 2023-08-24 RX ORDER — INSULIN LISPRO 100/ML
VIAL (ML) SUBCUTANEOUS AT BEDTIME
Refills: 0 | Status: DISCONTINUED | OUTPATIENT
Start: 2023-08-24 | End: 2023-08-25

## 2023-08-24 RX ORDER — INSULIN LISPRO 100/ML
VIAL (ML) SUBCUTANEOUS
Refills: 0 | Status: DISCONTINUED | OUTPATIENT
Start: 2023-08-24 | End: 2023-08-25

## 2023-08-24 RX ORDER — ENOXAPARIN SODIUM 100 MG/ML
40 INJECTION SUBCUTANEOUS EVERY 24 HOURS
Refills: 0 | Status: DISCONTINUED | OUTPATIENT
Start: 2023-08-24 | End: 2023-08-25

## 2023-08-24 RX ORDER — SODIUM CHLORIDE 9 MG/ML
1000 INJECTION, SOLUTION INTRAVENOUS
Refills: 0 | Status: DISCONTINUED | OUTPATIENT
Start: 2023-08-24 | End: 2023-08-25

## 2023-08-24 RX ORDER — CEFTRIAXONE 500 MG/1
1000 INJECTION, POWDER, FOR SOLUTION INTRAMUSCULAR; INTRAVENOUS EVERY 24 HOURS
Refills: 0 | Status: DISCONTINUED | OUTPATIENT
Start: 2023-08-24 | End: 2023-08-25

## 2023-08-24 RX ORDER — ATORVASTATIN CALCIUM 80 MG/1
40 TABLET, FILM COATED ORAL AT BEDTIME
Refills: 0 | Status: DISCONTINUED | OUTPATIENT
Start: 2023-08-24 | End: 2023-08-25

## 2023-08-24 RX ORDER — DEXTROSE 50 % IN WATER 50 %
12.5 SYRINGE (ML) INTRAVENOUS ONCE
Refills: 0 | Status: DISCONTINUED | OUTPATIENT
Start: 2023-08-24 | End: 2023-08-25

## 2023-08-24 RX ADMIN — ATORVASTATIN CALCIUM 40 MILLIGRAM(S): 80 TABLET, FILM COATED ORAL at 22:36

## 2023-08-24 RX ADMIN — SODIUM CHLORIDE 1000 MILLILITER(S): 9 INJECTION, SOLUTION INTRAVENOUS at 18:02

## 2023-08-24 RX ADMIN — CEFTRIAXONE 100 MILLIGRAM(S): 500 INJECTION, POWDER, FOR SOLUTION INTRAMUSCULAR; INTRAVENOUS at 17:21

## 2023-08-24 RX ADMIN — ENOXAPARIN SODIUM 40 MILLIGRAM(S): 100 INJECTION SUBCUTANEOUS at 19:11

## 2023-08-24 RX ADMIN — AZITHROMYCIN 255 MILLIGRAM(S): 500 TABLET, FILM COATED ORAL at 16:05

## 2023-08-24 NOTE — H&P ADULT - PROBLEM SELECTOR PLAN 7
Patient has a remote history of CVA, as well as a recent admission 9/2022 for acute infarct of the R cerebellum. Patient denies any residual effects from the stroke.   -Aspirin 81mg -Atorvastatin 40mg

## 2023-08-24 NOTE — H&P ADULT - PROBLEM SELECTOR PLAN 8
DVT prophylaxis: Lovenox  Diet: Regular   Dispo: pending clinical course  PT evaluation pending Patient has a remote history of CVA, as well as a recent admission 9/2022 for acute infarct of the R cerebellum. Patient denies any residual effects from the stroke.   -Aspirin 81mg

## 2023-08-24 NOTE — ED PROVIDER NOTE - ATTENDING CONTRIBUTION TO CARE
I performed a history and physical exam of the patient and discussed their management with the resident/fellow/ACP/student. I have reviewed the resident/fellow/ACP/student note and agree with the documented findings and plan of care, except as noted. I have personally performed a substantive portion of the visit including all aspects of the medical decision making. My medical decision making and observations are found above. Please refer to any progress notes for updates on clinical course.    See HPI/ROS/PE/MDM written by me

## 2023-08-24 NOTE — ED PROVIDER NOTE - CLINICAL SUMMARY MEDICAL DECISION MAKING FREE TEXT BOX
64 y/o male with pmhx of HTN, HLD, DM on metformin, CAD, BPH, remote hx CVA, presenting with cough x4 weeks and near syncopal episode, recently finished course of antibiotics with unclear chest x-ray/viral panel results, no LOC, no chest pain, intermittent shortness of breath, no PE risk factors, afebrile, hemodynamically stable. Exam/history concerning for but not limited to pneumonia versus viral syndrome versus metabolic derangement versus low suspicion of ACS. Will obtain cardiac enzymes, basic labs, chest x-ray, viral panel and reassess

## 2023-08-24 NOTE — H&P ADULT - ATTENDING COMMENTS
Pt seen and examined, chart and labs reviewed.  Care discussed with resident team.  Briefly a 63M HTN, HLD, DM2 on Metformin, CAD, BPH, CVA with no residual deficits presents with cough of 4 weeks duration.  He felt very weak and lightheaded which prompted him to come to ER.  Pt was given a 1 week course of abx (unknown which) that he completed.   During my exam, pt felt much improved, was able to tolerate full meal and felt more energized.     #Sepsis 2/2 community acquired PNA (subjective fevers + leukocytosis) vs. coronavirus  - Family to bring in medications for full med rec  - Agree with IV Cef/Azithro for now  - urine legionella pending    Agree with remainder of MS4 Terriin's note as above

## 2023-08-24 NOTE — H&P ADULT - ASSESSMENT
The patient is a 63-year-old man with PMH of HTN, HLD, DM, CAD, BPH, CVA presenting with a cough for 4 weeks and a near syncopal episode. Labs significant for Na 130, +respiratory viral panel, CXR significant for retrocardiac and left basilar opacities c/f infection.

## 2023-08-24 NOTE — H&P ADULT - PROBLEM SELECTOR PLAN 3
Patient had Na 130 on admission.   -Likely in the setting of poor PO over the past month.   -Pending urine Na and osm   -LR 1L bolus Patient describes feeling lightheaded when standing up out of bed.   -Concern for orthostatic hypotension, likely in setting of poor PO.   -Pressures lying down 124/80, sitting 109/73, standing 105/70.  -LR 1L bolus

## 2023-08-24 NOTE — H&P ADULT - PROBLEM SELECTOR PLAN 5
Patient on some dose of metformin at home, once a day.   -ISS Patient does not remember the name of his BP medication.  -/65 in ED

## 2023-08-24 NOTE — ED PROVIDER NOTE - DISPOSITION TYPE
POD#1 CE/IOL OS (Standard) doing well. Continue all 3 gtts as prescribed and until gone. Use Durezol BID OS Ilevro Qdaily OS Ocuflox TID OS Post op Warnings Reiterated RTC as scheduled
ADMIT

## 2023-08-24 NOTE — ED PROVIDER NOTE - PHYSICAL EXAMINATION
Gen: WDWN, NAD, comfortable appearing, afebrile, hemodynamically stable   HEENT: No nasal discharge, mucous membranes moist, no oropharyngeal edema/erythema/exudates   CV: RRR, +S1/S2, no M/R/G, equal b/l radial pulses 2+  Resp: CTAB, no W/R/R, SPO2 >95% on RA, no increased WOB   GI: Abdomen soft non-distended, NTTP, no masses/organomegaly   MSK/Skin: No CVA tenderness, no open wounds, no bruising, no LE edema/calf tenderness   Neuro: A&Ox4, moving all 4 extremities spontaneously, gross sensation intact in UE and LE BL  Psych: appropriate mood

## 2023-08-24 NOTE — H&P ADULT - HISTORY OF PRESENT ILLNESS
The patient is a 63-year-old man with PMH of HTN, HLD, DM, CAD, BPH, CVA presenting with a cough for 4 weeks and a near syncopal episode this morning. History was gathered from patient and his daughter at bedside.     Four weeks ago, the patient began having "flu-like" symptoms. He describes having a cough that has been making it difficult to sleep, on/off subjective fevers, chills, night sweats, and chest pain from the cough. The patient also endorses some shortness of breath related to the cough. He denies diarrhea/constipation, nausea/vomiting, leg swelling. He went to a doctor over a week ago who prescribed benzonatate and some antibiotic, which the patient finished taking. They also performed a chest x-ray and viral panel, but the patient is unsure of the results.     This morning, the patient experienced a near syncopal episode when getting out of bed. He describes feeling lightheaded when he stood up, with his vision going black and becoming blurry.     Patient also endorses a 10-pound weight loss over the past month. He says that his diet has remained unchanged, but daughter noticed that he has been eating less. He describes feeling a loss of appetite only today. He has not had a recent colonoscopy or lung cancer screening.     The patient denies any sick contacts or anyone sick in the home. He has not left the country since [place]. He is normally independent in all of his ADLs at home.

## 2023-08-24 NOTE — H&P ADULT - NSHPPHYSICALEXAM_GEN_ALL_CORE
T(C): 36.9 (08-24-23 @ 09:50), Max: 36.9 (08-24-23 @ 09:50)  HR: 66 (08-24-23 @ 09:50) (66 - 66)  BP: 103/65 (08-24-23 @ 09:50) (103/65 - 103/65)  RR: 18 (08-24-23 @ 09:50) (18 - 18)  SpO2: 97% (08-24-23 @ 09:50) (97% - 97%)    GEN: Well groomed, no apparent distress  HEENT: EOMI, no conjunctival or scleral injection, oral mucosa with moist membranes   RESP: crackles at L lung base   CV: RRR, +S1S2  GI: Soft, non-tender, non-distended  EXTR: No peripheral edema, 2+ pedal pulses b/l  NEURO: A&Ox3, no focal deficits

## 2023-08-24 NOTE — ED PROVIDER NOTE - PROGRESS NOTE DETAILS
Michelle Reyna PGY-3: Patient has WBC 15, CXR c/w PNA. Patient failed OP abx. TBA. Patient agreeable to plan.

## 2023-08-24 NOTE — ED PROVIDER NOTE - NSICDXNOPASTSURGICALHX_GEN_ALL_ED
<-- Click to add NO significant Past Surgical History 1. VOD prophylaxis - low dose heparin gtt (dosed at 100 units / kg / day), glutamine supplementation, Actigall BID   2. PCP prophylaxis - Bactrim DS through day -2    3. Antiviral prophylaxis - Acyclovir 400mg po TID to start day -1   4. Antifungal prophylaxis- Diflucan 400 mg po daily.  5.. GI prophylaxis - Protonix po QD   6. Antibacterial prophylaxis - when ANC < 1000, start Cipro 500mg po BID. If becomes febrile, pan cx, CXR and change Cipro to Cefepime 2g IV q 8 hours. Continue until count recovery  7. Kepivance for prevention of mucositis- days 0, +1, +2  8. Aggressive mouth care and skin care as per protocol.

## 2023-08-24 NOTE — H&P ADULT - PROBLEM SELECTOR PLAN 4
Patient does not remember the name of his BP medication.  -/65 in ED Patient's creatinine on admission 1.30, last baseline from 9/2022 0.81.   -Likely in setting of poor PO intake  -LR 1L bolus

## 2023-08-24 NOTE — ED PROVIDER NOTE - OBJECTIVE STATEMENT
64 y/o male with pmhx of HTN, HLD, DM on metformin, CAD, BPH, remote hx CVA, presenting with cough x4 weeks and near syncopal episode.  Daughter and patient report patient has had chronic cough for 4 weeks, saw primary care doctor who prescribed antitussives and antibiotic. Patient had chest x-ray and viral panel performed but unsure of results. Today cough became worse and daughter reports that patient complained at one point of feeling like he might pass out with no LOC/fall/trauma, chest pain, palpitations, diaphoresis, focal weakness, numbness/tingling, leg pain/swelling, hemoptysis, recent travel/bed bound nature. Patient does report feeling intermittently short of breath, mostly related to coughing episodes.  No recorded fevers in past 4 weeks patient reports intermittent sensation of fever/chills no nausea/vomiting/diarrhea, rashes, dysuria, or hematuria.

## 2023-08-24 NOTE — ED ADULT TRIAGE NOTE - CHIEF COMPLAINT QUOTE
c/o persistent cough and fever for the past 2 weeks reports worse since last night, unrelieved with OTC medications,. hx of DM. no pedal edema noted.

## 2023-08-24 NOTE — H&P ADULT - PROBLEM SELECTOR PLAN 2
Patient describes feeling lightheaded when standing up out of bed.   -Concern for orthostatic hypotension, likely in setting of poor PO.   -Pressures lying down 124/80, sitting 109/73, standing 105/70.  -LR 1L bolus Patient had Na 130 on admission.   -Likely in the setting of poor PO over the past month.   -Pending urine Na and osm   -LR 1L bolus

## 2023-08-24 NOTE — ED PROVIDER NOTE - NSICDXFAMILYHX_GEN_ALL_CORE_FT
FAMILY HISTORY:  Family history of early CAD    Father  Still living? Unknown  FH: diabetes mellitus, Age at diagnosis: Age Unknown  FH: hypertension, Age at diagnosis: Age Unknown    Mother  Still living? Unknown  FH: diabetes mellitus, Age at diagnosis: Age Unknown  FH: hypertension, Age at diagnosis: Age Unknown

## 2023-08-24 NOTE — H&P ADULT - PROBLEM SELECTOR PLAN 1
Patient presented with 4 weeks of cough, on/off subjective fevers, chills, and night sweats. Patient was previously prescribed some antibiotic by his PCP.   -CXR showed retrocardiac and left basilar opacities which may represent infection   -Respiratory viral panel +   -Ceftriaxone and azithromycin started for concern of CAP  -Patient describes 10lb weight loss and former smoking history, may consider CT chest for possible malignancy work-up Patient presented with 4 weeks of cough, on/off subjective fevers, chills, and night sweats. Patient was previously prescribed some antibiotic by his PCP.   -CXR showed retrocardiac and left basilar opacities which may represent infection   -Respiratory viral panel +OC43 coronavirus   -Ceftriaxone and azithromycin started for concern of CAP  -Pending urine legionella

## 2023-08-24 NOTE — PATIENT PROFILE ADULT - FALL HARM RISK - HARM RISK INTERVENTIONS

## 2023-08-25 ENCOUNTER — TRANSCRIPTION ENCOUNTER (OUTPATIENT)
Age: 63
End: 2023-08-25

## 2023-08-25 VITALS
SYSTOLIC BLOOD PRESSURE: 108 MMHG | OXYGEN SATURATION: 98 % | RESPIRATION RATE: 18 BRPM | HEART RATE: 80 BPM | DIASTOLIC BLOOD PRESSURE: 69 MMHG | TEMPERATURE: 98 F

## 2023-08-25 LAB
A1C WITH ESTIMATED AVERAGE GLUCOSE RESULT: 7.1 % — HIGH (ref 4–5.6)
ALBUMIN SERPL ELPH-MCNC: 3.5 G/DL — SIGNIFICANT CHANGE UP (ref 3.3–5)
ALP SERPL-CCNC: 70 U/L — SIGNIFICANT CHANGE UP (ref 40–120)
ALT FLD-CCNC: 8 U/L — SIGNIFICANT CHANGE UP (ref 4–41)
ANION GAP SERPL CALC-SCNC: 14 MMOL/L — SIGNIFICANT CHANGE UP (ref 7–14)
AST SERPL-CCNC: 9 U/L — SIGNIFICANT CHANGE UP (ref 4–40)
BASOPHILS # BLD AUTO: 0.04 K/UL — SIGNIFICANT CHANGE UP (ref 0–0.2)
BASOPHILS NFR BLD AUTO: 0.3 % — SIGNIFICANT CHANGE UP (ref 0–2)
BILIRUB SERPL-MCNC: 0.5 MG/DL — SIGNIFICANT CHANGE UP (ref 0.2–1.2)
BUN SERPL-MCNC: 20 MG/DL — SIGNIFICANT CHANGE UP (ref 7–23)
CALCIUM SERPL-MCNC: 9 MG/DL — SIGNIFICANT CHANGE UP (ref 8.4–10.5)
CHLORIDE SERPL-SCNC: 95 MMOL/L — LOW (ref 98–107)
CO2 SERPL-SCNC: 22 MMOL/L — SIGNIFICANT CHANGE UP (ref 22–31)
CREAT SERPL-MCNC: 0.88 MG/DL — SIGNIFICANT CHANGE UP (ref 0.5–1.3)
EGFR: 97 ML/MIN/1.73M2 — SIGNIFICANT CHANGE UP
EOSINOPHIL # BLD AUTO: 0.05 K/UL — SIGNIFICANT CHANGE UP (ref 0–0.5)
EOSINOPHIL NFR BLD AUTO: 0.4 % — SIGNIFICANT CHANGE UP (ref 0–6)
ESTIMATED AVERAGE GLUCOSE: 157 — SIGNIFICANT CHANGE UP
GLUCOSE BLDC GLUCOMTR-MCNC: 144 MG/DL — HIGH (ref 70–99)
GLUCOSE SERPL-MCNC: 142 MG/DL — HIGH (ref 70–99)
HCT VFR BLD CALC: 39.4 % — SIGNIFICANT CHANGE UP (ref 39–50)
HGB BLD-MCNC: 13 G/DL — SIGNIFICANT CHANGE UP (ref 13–17)
IANC: 10.32 K/UL — HIGH (ref 1.8–7.4)
IMM GRANULOCYTES NFR BLD AUTO: 0.4 % — SIGNIFICANT CHANGE UP (ref 0–0.9)
LYMPHOCYTES # BLD AUTO: 1.48 K/UL — SIGNIFICANT CHANGE UP (ref 1–3.3)
LYMPHOCYTES # BLD AUTO: 11 % — LOW (ref 13–44)
MAGNESIUM SERPL-MCNC: 1.6 MG/DL — SIGNIFICANT CHANGE UP (ref 1.6–2.6)
MCHC RBC-ENTMCNC: 24.6 PG — LOW (ref 27–34)
MCHC RBC-ENTMCNC: 33 GM/DL — SIGNIFICANT CHANGE UP (ref 32–36)
MCV RBC AUTO: 74.5 FL — LOW (ref 80–100)
MONOCYTES # BLD AUTO: 1.49 K/UL — HIGH (ref 0–0.9)
MONOCYTES NFR BLD AUTO: 11.1 % — SIGNIFICANT CHANGE UP (ref 2–14)
NEUTROPHILS # BLD AUTO: 10.32 K/UL — HIGH (ref 1.8–7.4)
NEUTROPHILS NFR BLD AUTO: 76.8 % — SIGNIFICANT CHANGE UP (ref 43–77)
NRBC # BLD: 0 /100 WBCS — SIGNIFICANT CHANGE UP (ref 0–0)
NRBC # FLD: 0 K/UL — SIGNIFICANT CHANGE UP (ref 0–0)
PHOSPHATE SERPL-MCNC: 2.9 MG/DL — SIGNIFICANT CHANGE UP (ref 2.5–4.5)
PLATELET # BLD AUTO: 424 K/UL — HIGH (ref 150–400)
POTASSIUM SERPL-MCNC: 4.1 MMOL/L — SIGNIFICANT CHANGE UP (ref 3.5–5.3)
POTASSIUM SERPL-SCNC: 4.1 MMOL/L — SIGNIFICANT CHANGE UP (ref 3.5–5.3)
PROT SERPL-MCNC: 7.9 G/DL — SIGNIFICANT CHANGE UP (ref 6–8.3)
RBC # BLD: 5.29 M/UL — SIGNIFICANT CHANGE UP (ref 4.2–5.8)
RBC # FLD: 13.6 % — SIGNIFICANT CHANGE UP (ref 10.3–14.5)
SODIUM SERPL-SCNC: 131 MMOL/L — LOW (ref 135–145)
WBC # BLD: 13.44 K/UL — HIGH (ref 3.8–10.5)
WBC # FLD AUTO: 13.44 K/UL — HIGH (ref 3.8–10.5)

## 2023-08-25 PROCEDURE — 99239 HOSP IP/OBS DSCHRG MGMT >30: CPT | Mod: GC

## 2023-08-25 RX ADMIN — Medication 81 MILLIGRAM(S): at 11:54

## 2023-08-25 RX ADMIN — AZITHROMYCIN 255 MILLIGRAM(S): 500 TABLET, FILM COATED ORAL at 14:58

## 2023-08-25 RX ADMIN — Medication 650 MILLIGRAM(S): at 06:06

## 2023-08-25 RX ADMIN — CEFTRIAXONE 100 MILLIGRAM(S): 500 INJECTION, POWDER, FOR SOLUTION INTRAMUSCULAR; INTRAVENOUS at 17:22

## 2023-08-25 RX ADMIN — Medication 650 MILLIGRAM(S): at 07:06

## 2023-08-25 NOTE — DISCHARGE NOTE PROVIDER - DETAILS OF MALNUTRITION DIAGNOSIS/DIAGNOSES
This patient has been assessed with a concern for Malnutrition and was treated during this hospitalization for the following Nutrition diagnosis/diagnoses:     -  08/25/2023: Moderate protein-calorie malnutrition

## 2023-08-25 NOTE — PROGRESS NOTE ADULT - PROBLEM SELECTOR PLAN 3
Patient describes feeling lightheaded when standing up out of bed.   -Concern for orthostatic hypotension, likely in setting of poor PO.   -Pressures lying down 124/80, sitting 109/73, standing 105/70.  -LR 1L bolus

## 2023-08-25 NOTE — PHYSICAL THERAPY INITIAL EVALUATION ADULT - ADDITIONAL COMMENTS
Pt states he lives in an apartment with his family and has ~8-10 steps to negotiate. Prior to admission, pt was ambulating independently without any assistive devices.   Post PT evaluation, pt left semi-supine, alarm on, call bell and remote within reach, all precautions maintained, NAD. RN aware.

## 2023-08-25 NOTE — DISCHARGE NOTE PROVIDER - HOSPITAL COURSE
The patient is a 63-year-old man with PMH of HTN, HLD, DM, CAD, BPH, CVA presenting with a cough for 4 weeks and a near syncopal episode this morning. Labs significant for Na 130, WBC 15.66, creatinine 1.30 (baseline 0.81 from 9/22) +OC43 coronavirus, CXR significant for retrocardiac and left basilar opacities c/f infection. He was started on ceftriaxone and azithromycin for community-acquired pneumonia. The patient's blood pressures lying down were 124/80, sitting 109/73, and standing 105/70. He was given a 1L LR bolus for orthostatic hypotension and SHIRLENE likely in the setting of poor PO intake.     While in the hospital, the patient spiked a fever to 102.1F and was tachy to 120. He was given 650mg acetaminophen, which improved his symptoms. Otherwise, the patient's vitals and labs have been stable, with his WBC count decreasing to 13.44 after initiation of treatment.    The patient is a 63-year-old man with PMH of HTN, HLD, DM, CAD, BPH, CVA presenting with a cough for 4 weeks and a near syncopal episode this morning. Labs significant for Na 130, WBC 15.66, creatinine 1.30 (baseline 0.81 from 9/22) +OC43 coronavirus, CXR significant for retrocardiac and left basilar opacities c/f infection. He was started on ceftriaxone and azithromycin for community-acquired pneumonia. The patient's blood pressures lying down were 124/80, sitting 109/73, and standing 105/70. He was given a 1L LR bolus for orthostatic hypotension and SHIRLENE likely in the setting of poor PO intake.     While in the hospital, the patient spiked a fever to 102.1F and was tachy to 120. He was given 650mg acetaminophen, which improved his symptoms. Otherwise, the patient's vitals and labs have been stable, with his WBC count decreasing to 13.44 after initiation of treatment. Pre-syncopal symptoms improved with 1L IVF and he felt well and wanted to go home on day 1 of admission. Will DC with Augmentin.

## 2023-08-25 NOTE — PROGRESS NOTE ADULT - PROBLEM SELECTOR PLAN 1
Patient presented with 4 weeks of cough, on/off subjective fevers, chills, and night sweats. Patient was previously prescribed some antibiotic by his PCP.   -CXR showed retrocardiac and left basilar opacities which may represent infection   -Respiratory viral panel +OC43 coronavirus   -Ceftriaxone and azithromycin started for concern of CAP  -Pending urine legionella

## 2023-08-25 NOTE — DISCHARGE NOTE PROVIDER - CARE PROVIDER_API CALL
[facility]  Internal Medicine  [address] [place] [address], [address]  [place] [address]  Phone: [phone]  Fax: [phone]  Follow Up Time: 1 week

## 2023-08-25 NOTE — PHYSICAL THERAPY INITIAL EVALUATION ADULT - GENERAL OBSERVATIONS, REHAB EVAL
Pt received semi-supine in bed, with all lines intact, NAD, all precautions maintained. BP: 104/67, HR: 79, SpO2: 97% on room air

## 2023-08-25 NOTE — DISCHARGE NOTE PROVIDER - NSDCCPCAREPLAN_GEN_ALL_CORE_FT
PRINCIPAL DISCHARGE DIAGNOSIS  Diagnosis: Community acquired pneumonia  Assessment and Plan of Treatment: You came to the hospital with 4 weeks of cough and fevers. Your chest x-ray and labwork was consistent with a diagnosis of pneumonia, a bacterial infection in the lungs. You were started on a course of antibiotics while in the hospital. Please continue to take your antibiotics at home as prescribed and follow-up with your outpatient provider.      SECONDARY DISCHARGE DIAGNOSES  Diagnosis: Orthostatic hypotension  Assessment and Plan of Treatment:     Diagnosis: Hyponatremia  Assessment and Plan of Treatment:     Diagnosis: SHIRLENE (acute kidney injury)  Assessment and Plan of Treatment:      PRINCIPAL DISCHARGE DIAGNOSIS  Diagnosis: Community acquired pneumonia  Assessment and Plan of Treatment: You came to the hospital with 4 weeks of cough and fevers. Your chest x-ray and labwork was consistent with a diagnosis of pneumonia, a bacterial infection in the lungs. You also tested positive for coronavirus (NOT COVID-19, a different common cold virus). You were started on a course of antibiotics while in the hospital. Please continue to take your antibiotics at home as prescribed and follow-up with your outpatient provider. We have given you 2 days of IV antibiotics, so take your prescribed pills beginning tomorrow 8/26 and take for 3 days to complete a full 5 day course. The name of the antibiotic pill is Augmentin. Please follow with your Primary Care Doctor within 1 week. Plaese take the cough medicine also prescribed to you as needed before bed for your cough, and drink warm tea with lemon and honey if possible. cough drops during the day you can buy over the counter may also help.      SECONDARY DISCHARGE DIAGNOSES  Diagnosis: Orthostatic hypotension  Assessment and Plan of Treatment: You felt like you were going to faint when you came to the ER, your blood pressure was dropping when you stood, up you were very dehydrated. Please continue to stay hydrated during this illness. Talk with your primary care doctor about your blood pressure medications and if they want to continue all of them.

## 2023-08-25 NOTE — PROGRESS NOTE ADULT - SUBJECTIVE AND OBJECTIVE BOX
Yaz Askew, MS4    PROGRESS NOTE:     Patient is a 63y old  Male who presents with a chief complaint of cough (24 Aug 2023 15:45)      MAJOR INTERVAL HOSPITAL EVENTS:     SUBJECTIVE / OVERNIGHT EVENTS: Patient was febrile to 102.1 and tachy to 120 overnight- given tylenol 650mg this AM. Patient feels better after receiving the tylenol. He endorses coughing through the night and having a difficult time sleeping. He also woke up with a mild headache. He was very hot and sweaty upon waking up this morning.        MEDICATIONS  (STANDING):  aspirin enteric coated 81 milliGRAM(s) Oral daily  atorvastatin 40 milliGRAM(s) Oral at bedtime  azithromycin  IVPB      azithromycin  IVPB 500 milliGRAM(s) IV Intermittent every 24 hours  cefTRIAXone   IVPB 1000 milliGRAM(s) IV Intermittent every 24 hours  dextrose 5%. 1000 milliLiter(s) (50 mL/Hr) IV Continuous <Continuous>  dextrose 5%. 1000 milliLiter(s) (100 mL/Hr) IV Continuous <Continuous>  dextrose 50% Injectable 25 Gram(s) IV Push once  dextrose 50% Injectable 25 Gram(s) IV Push once  dextrose 50% Injectable 12.5 Gram(s) IV Push once  enoxaparin Injectable 40 milliGRAM(s) SubCutaneous every 24 hours  glucagon  Injectable 1 milliGRAM(s) IntraMuscular once  insulin lispro (ADMELOG) corrective regimen sliding scale   SubCutaneous three times a day before meals  insulin lispro (ADMELOG) corrective regimen sliding scale   SubCutaneous at bedtime    MEDICATIONS  (PRN):  acetaminophen     Tablet .. 650 milliGRAM(s) Oral every 6 hours PRN Temp greater or equal to 38C (100.4F), Mild Pain (1 - 3)  dextrose Oral Gel 15 Gram(s) Oral once PRN Blood Glucose LESS THAN 70 milliGRAM(s)/deciliter  melatonin 3 milliGRAM(s) Oral at bedtime PRN Insomnia      CAPILLARY BLOOD GLUCOSE      POCT Blood Glucose.: 144 mg/dL (25 Aug 2023 09:14)  POCT Blood Glucose.: 122 mg/dL (24 Aug 2023 22:20)  POCT Blood Glucose.: 104 mg/dL (24 Aug 2023 19:09)  POCT Blood Glucose.: 97 mg/dL (24 Aug 2023 18:14)  POCT Blood Glucose.: 116 mg/dL (24 Aug 2023 16:58)  POCT Blood Glucose.: 150 mg/dL (24 Aug 2023 09:50)    I&O's Summary      PHYSICAL EXAM:  Vital Signs Last 24 Hrs  T(C): 37.3 (25 Aug 2023 07:07), Max: 38.9 (25 Aug 2023 05:38)  T(F): 99.1 (25 Aug 2023 07:07), Max: 102.1 (25 Aug 2023 05:38)  HR: 105 (25 Aug 2023 07:07) (66 - 120)  BP: 102/72 (25 Aug 2023 07:07) (102/72 - 140/80)  BP(mean): --  RR: 18 (25 Aug 2023 07:07) (17 - 18)  SpO2: 98% (25 Aug 2023 07:07) (95% - 100%)    Parameters below as of 25 Aug 2023 07:07  Patient On (Oxygen Delivery Method): room air        GENERAL: No acute distress, well-appearing  CHEST/LUNG: CTAB; No wheezes, rales, or rhonchi  HEART: Regular rate and rhythm; Normal s1 and s2, No murmurs, rubs, or gallops  ABDOMEN: Soft, non-tender, non-distended; normal bowel sounds, no organomegaly  EXTREMITIES:  2+ peripheral pulses b/l, No clubbing, cyanosis, or edema  NEUROLOGY: A&O x 3, no focal deficits        LABS:                        13.0   13.44 )-----------( 424      ( 25 Aug 2023 06:44 )             39.4     08-25    131<L>  |  95<L>  |  20  ----------------------------<  142<H>  4.1   |  22  |  0.88    Ca    9.0      25 Aug 2023 06:44  Phos  2.9     08-25  Mg     1.60     08-25    TPro  7.9  /  Alb  3.5  /  TBili  0.5  /  DBili  x   /  AST  9   /  ALT  8   /  AlkPhos  70  08-25    PT/INR - ( 24 Aug 2023 11:00 )   PT: 13.4 sec;   INR: 1.19 ratio         PTT - ( 24 Aug 2023 11:00 )  PTT:31.0 sec      Urinalysis Basic - ( 25 Aug 2023 06:44 )    Color: x / Appearance: x / SG: x / pH: x  Gluc: 142 mg/dL / Ketone: x  / Bili: x / Urobili: x   Blood: x / Protein: x / Nitrite: x   Leuk Esterase: x / RBC: x / WBC x   Sq Epi: x / Non Sq Epi: x / Bacteria: x

## 2023-08-25 NOTE — PHYSICAL THERAPY INITIAL EVALUATION ADULT - NSPTDISCHREC_GEN_A_CORE
Home with no skilled PT needs. Pt is at his functional baseline and will not be placed on PT program. Please reconsult if indicated./No skilled PT needs

## 2023-08-25 NOTE — PROGRESS NOTE ADULT - PROBLEM SELECTOR PLAN 2
Patient had Na 130 on admission.   -Likely in the setting of poor PO over the past month.   -Pending urine Na and osm   -LR 1L bolus

## 2023-08-25 NOTE — PROGRESS NOTE ADULT - PROBLEM SELECTOR PLAN 8
Patient has a remote history of CVA, as well as a recent admission 9/2022 for acute infarct of the R cerebellum. Patient denies any residual effects from the stroke.   -Aspirin 81mg

## 2023-08-25 NOTE — DIETITIAN INITIAL EVALUATION ADULT - FUNCTIONAL SCREEN CURRENT LEVEL: SWALLOWING (IF SCORE 2 OR MORE FOR ANY ITEM, CONSULT REHAB SERVICES), MLM)
Department of Obstetrics and Gynecology  VAGINAL DELIVERY  Procedure Note      Gestational Status:  Term pregnancy, Induced labor and Single fetus     Anesthesia:  Epidural    Delivery Summary:      Delivery Type: normal spontaneous vaginal delivery  at term    Gender: Male infant. Weight:  7 lbs. ,  4 oz. Birth Time: 1943    Apgars: (9 - 9)    Remarks:    Absent    Nuchal Cord: was present and reduced    Disposition:  Floor. Estimated blood loss: 350 cc. Condition:  infant stable to general nursery and mother stable    Attending Attestation: I performed the procedure.     Christine Card MD, Génesis Zheng
0 = swallows foods/liquids without difficulty

## 2023-08-25 NOTE — DIETITIAN INITIAL EVALUATION ADULT - PERTINENT LABORATORY DATA
08-25    131<L>  |  95<L>  |  20  ----------------------------<  142<H>  4.1   |  22  |  0.88    Ca    9.0      25 Aug 2023 06:44  Phos  2.9     08-25  Mg     1.60     08-25    TPro  7.9  /  Alb  3.5  /  TBili  0.5  /  DBili  x   /  AST  9   /  ALT  8   /  AlkPhos  70  08-25  POCT Blood Glucose.: 144 mg/dL (08-25-23 @ 09:14)  A1C with Estimated Average Glucose Result: 7.1 % (08-25-23 @ 06:44)  A1C with Estimated Average Glucose Result: 6.2 % (09-09-22 @ 12:00)

## 2023-08-25 NOTE — DIETITIAN INITIAL EVALUATION ADULT - ADD RECOMMEND
Continue diet as ordered. Nutrition department will provide Orgain BID (440 kcal, 32 g pro). Monitor PO intake. Continue diet as ordered given prolonged PO intake. Nutrition department will provide Orgain BID (440 kcal, 32 g pro). Monitor PO intake.

## 2023-08-25 NOTE — PROGRESS NOTE ADULT - PROBLEM SELECTOR PLAN 4
Patient's creatinine on admission 1.30, last baseline from 9/2022 0.81.   -Likely in setting of poor PO intake  -LR 1L bolus

## 2023-08-25 NOTE — DISCHARGE NOTE PROVIDER - NSDCFUSCHEDAPPT_GEN_ALL_CORE_FT
[facility] [phone]  Scheduled Appointment: 09/19/2023    [facility] [phone]  Scheduled Appointment: 11/13/2023

## 2023-08-25 NOTE — DIETITIAN INITIAL EVALUATION ADULT - ORAL INTAKE PTA/DIET HISTORY
Patient seen for assessment. Reports poor PO intake x 1 month 2/2 cough. Doesn't follow a diet at home. Patient seen for assessment. Reports poor PO intake x 1 month 2/2 cough. Doesn't follow a diet at home. A1c is 7.1%.

## 2023-08-25 NOTE — PROGRESS NOTE ADULT - ATTENDING COMMENTS
Pt seen and examined, chart and labs reviewed.  Care discussed with resident team.  Briefly a 63M HTN, HLD, DM2 on Metformin, CAD, BPH, CVA with no residual deficits presents with cough of 4 weeks duration.  He felt very weak and lightheaded which prompted him to come to ER.  Pt was given a Z-pack per PMD discussion.  Today, pt feels much improved despite fever this morning, able to walk around without any dizziness.     #Sepsis 2/2 community acquired PNA (subjective fevers + leukocytosis) vs. coronavirus  - Bcx collected, will f/u and inform pt if return positive  - DC Home on Augmentin X 5 days     DC time: 32 minutes

## 2023-08-25 NOTE — DISCHARGE NOTE NURSING/CASE MANAGEMENT/SOCIAL WORK - PATIENT PORTAL LINK FT
You can access the FollowMyHealth Patient Portal offered by [facility] by registering at the following website: [email or web address]. By joining [employer]’s FollowMyHealth portal, you will also be able to view your health information using other applications (apps) compatible with our system.

## 2023-08-25 NOTE — DISCHARGE NOTE NURSING/CASE MANAGEMENT/SOCIAL WORK - NSDCPEFALRISK_GEN_ALL_CORE
For information on Fall & Injury Prevention, visit: [email or web address] OR  [email or web address] OR  https://www.cdc.gov/steadi/patient.html

## 2023-08-25 NOTE — DIETITIAN INITIAL EVALUATION ADULT - OTHER INFO
63 year old male with a PMH of HTN, HLD, DM, CAD, BPH, CVA presenting with a cough for 4 weeks and a near syncopal episode. Labs significant for Na 130, +respiratory viral panel, CXR significant for retrocardiac and left basilar opacities c/f infection per chart.    Patient reporting improved appetite now. No food preferences at this time. No GI distress or chewing/swallowing difficulties. Has no food allergies. UBW is 138 lbs. x 1 month and notices losing weight. ABW is 132.4 lbs. (8/24) per chart indicating a -4.1% weight loss x 1 month. No edema or pressure injuries noted per RN flow sheet.

## 2023-08-25 NOTE — DISCHARGE NOTE NURSING/CASE MANAGEMENT/SOCIAL WORK - NSDCPEPTSTROKESIGNS_GEN_ALL_CORE
Sudden numbness or weakness of the face, arm, or leg, especially on one side of the body. Confusion, trouble speaking or understanding. Trouble seeing in one or both eyes. Trouble walking, dizziness, loss of balance or coordination. Severe headache. Statement Selected

## 2023-08-25 NOTE — DIETITIAN INITIAL EVALUATION ADULT - PERTINENT MEDS FT
MEDICATIONS  (STANDING):  aspirin enteric coated 81 milliGRAM(s) Oral daily  atorvastatin 40 milliGRAM(s) Oral at bedtime  azithromycin  IVPB      azithromycin  IVPB 500 milliGRAM(s) IV Intermittent every 24 hours  cefTRIAXone   IVPB 1000 milliGRAM(s) IV Intermittent every 24 hours  dextrose 5%. 1000 milliLiter(s) (50 mL/Hr) IV Continuous <Continuous>  dextrose 5%. 1000 milliLiter(s) (100 mL/Hr) IV Continuous <Continuous>  dextrose 50% Injectable 25 Gram(s) IV Push once  dextrose 50% Injectable 25 Gram(s) IV Push once  dextrose 50% Injectable 12.5 Gram(s) IV Push once  enoxaparin Injectable 40 milliGRAM(s) SubCutaneous every 24 hours  glucagon  Injectable 1 milliGRAM(s) IntraMuscular once  insulin lispro (ADMELOG) corrective regimen sliding scale   SubCutaneous three times a day before meals  insulin lispro (ADMELOG) corrective regimen sliding scale   SubCutaneous at bedtime    MEDICATIONS  (PRN):  acetaminophen     Tablet .. 650 milliGRAM(s) Oral every 6 hours PRN Temp greater or equal to 38C (100.4F), Mild Pain (1 - 3)  dextrose Oral Gel 15 Gram(s) Oral once PRN Blood Glucose LESS THAN 70 milliGRAM(s)/deciliter  melatonin 3 milliGRAM(s) Oral at bedtime PRN Insomnia

## 2023-08-25 NOTE — DISCHARGE NOTE PROVIDER - NSDCMRMEDTOKEN_GEN_ALL_CORE_FT
aspirin 81 mg oral delayed release tablet: 1 tab(s) orally once a day  atorvastatin 20 mg oral tablet: 1 tab(s) orally once a day  clopidogrel 75 mg oral tablet: 1 tab(s) orally once a day   for Total 90day therapy per neurology   losartan 50 mg oral tablet: 1 tab(s) orally once a day  HOLD for blood pressure less than 100  meclizine 25 mg oral tablet: 1 tab(s) orally every 12 hours, As needed, Dizziness  metFORMIN 500 mg oral tablet: 1 tab(s) orally 2 times a day  Outpatient physical therapy : 3-5 x/week for 2weeks  Dx: R42, I63.9  oxybutynin 10 mg/24 hr oral tablet, extended release: 1 tab(s) orally once a day  tamsulosin 0.4 mg oral capsule: 2 cap(s) orally once a day (at bedtime)   amoxicillin-clavulanate 875 mg-125 mg oral tablet: 1 tab(s) orally 2 times a day Please start on 8/26 MDD: 2 tabs  aspirin 81 mg oral delayed release tablet: 1 tab(s) orally once a day  atorvastatin 20 mg oral tablet: 1 tab(s) orally once a day  clopidogrel 75 mg oral tablet: 1 tab(s) orally once a day   for Total 90day therapy per neurology   guaiFENesin 600 mg oral tablet, extended release: 1 tab(s) orally 2 times a day as needed for  cough  losartan 50 mg oral tablet: 1 tab(s) orally once a day  HOLD for blood pressure less than 100  meclizine 25 mg oral tablet: 1 tab(s) orally every 12 hours, As needed, Dizziness  metFORMIN 500 mg oral tablet: 1 tab(s) orally 2 times a day  oxybutynin 10 mg/24 hr oral tablet, extended release: 1 tab(s) orally once a day  tamsulosin 0.4 mg oral capsule: 2 cap(s) orally once a day (at bedtime)

## 2023-08-30 LAB
CULTURE RESULTS: SIGNIFICANT CHANGE UP
CULTURE RESULTS: SIGNIFICANT CHANGE UP
SPECIMEN SOURCE: SIGNIFICANT CHANGE UP
SPECIMEN SOURCE: SIGNIFICANT CHANGE UP

## 2023-09-19 ENCOUNTER — APPOINTMENT (OUTPATIENT)
Dept: ELECTROPHYSIOLOGY | Facility: CLINIC | Age: 63
End: 2023-09-19
Payer: MEDICAID

## 2023-09-19 ENCOUNTER — NON-APPOINTMENT (OUTPATIENT)
Age: 63
End: 2023-09-19

## 2023-09-20 PROCEDURE — 93298 REM INTERROG DEV EVAL SCRMS: CPT

## 2023-09-20 PROCEDURE — G2066: CPT

## 2023-10-24 ENCOUNTER — APPOINTMENT (OUTPATIENT)
Dept: ELECTROPHYSIOLOGY | Facility: CLINIC | Age: 63
End: 2023-10-24

## 2023-11-13 ENCOUNTER — APPOINTMENT (OUTPATIENT)
Dept: ELECTROPHYSIOLOGY | Facility: CLINIC | Age: 63
End: 2023-11-13

## 2023-11-15 NOTE — DISCHARGE NOTE PROVIDER - NSDCQMERRANDS_GEN_ALL_CORE
Lot # For Kenalog (Optional): [id number] Medical Necessity Clause: This procedure was medically necessary because the lesions that were treated were: Require Ndc Code?: No Detail Level: Detailed Total Volume (Ccs): 0.1 Concentration Of Kenalog Solution Injected (Mg/Ml): 5.0 Validate Note Data When Using Inventory: Yes Size Of Lesion (Optional): 0.2 Kenalog Preparation: Kenalog in bacteriostatic water Consent: The risks of atrophy were reviewed with the patient. How Many Mls Were Removed From The 10 Mg/Ml (5ml) Vial When Preparing The Injectable Solution?: 0 Expiration Date For Kenalog (Optional): AUG 2024 Kenalog Type Of Vial: Multiple Dose Administered By (Optional): ZULEIMA No

## 2023-11-30 NOTE — PROVIDER CONTACT NOTE (CHANGE IN STATUS NOTIFICATION) - SITUATION
Addended by: LAURE VILLA on: 11/30/2023 03:50 PM     Modules accepted: Orders    
Patient HR 40s-50s on monitor, sinus bradycardia; other vitals stable as per chart; pt asymptomatic lying in bed
Pt had BP of 182/74 then 166/67 was lowest measurement RN could obtain.
patient + ortho bp lying to sitting, see flowsheet ortho BPs. asymptomatic.

## 2024-01-11 ENCOUNTER — NON-APPOINTMENT (OUTPATIENT)
Age: 64
End: 2024-01-11

## 2024-01-11 ENCOUNTER — APPOINTMENT (OUTPATIENT)
Dept: ELECTROPHYSIOLOGY | Facility: CLINIC | Age: 64
End: 2024-01-11
Payer: MEDICAID

## 2024-01-11 PROCEDURE — 93298 REM INTERROG DEV EVAL SCRMS: CPT

## 2024-02-15 ENCOUNTER — NON-APPOINTMENT (OUTPATIENT)
Age: 64
End: 2024-02-15

## 2024-02-15 ENCOUNTER — APPOINTMENT (OUTPATIENT)
Dept: ELECTROPHYSIOLOGY | Facility: CLINIC | Age: 64
End: 2024-02-15
Payer: COMMERCIAL

## 2024-02-16 PROCEDURE — 93298 REM INTERROG DEV EVAL SCRMS: CPT

## 2024-02-22 NOTE — ED PROVIDER NOTE - NS_BEDUNITTYPES_ED_ALL_ED
No care due was identified.  [facility] Embedded Care Due Messages. Reference number: [id number].   2/22/2024 5:31:19 PM CST  
MEDICINE

## 2024-03-21 ENCOUNTER — NON-APPOINTMENT (OUTPATIENT)
Age: 64
End: 2024-03-21

## 2024-03-21 ENCOUNTER — APPOINTMENT (OUTPATIENT)
Dept: ELECTROPHYSIOLOGY | Facility: CLINIC | Age: 64
End: 2024-03-21
Payer: COMMERCIAL

## 2024-03-21 PROCEDURE — 93298 REM INTERROG DEV EVAL SCRMS: CPT

## 2024-04-25 ENCOUNTER — APPOINTMENT (OUTPATIENT)
Dept: ELECTROPHYSIOLOGY | Facility: CLINIC | Age: 64
End: 2024-04-25
Payer: COMMERCIAL

## 2024-04-25 ENCOUNTER — NON-APPOINTMENT (OUTPATIENT)
Age: 64
End: 2024-04-25

## 2024-04-25 PROCEDURE — 93298 REM INTERROG DEV EVAL SCRMS: CPT

## 2024-06-11 ENCOUNTER — APPOINTMENT (OUTPATIENT)
Dept: ELECTROPHYSIOLOGY | Facility: CLINIC | Age: 64
End: 2024-06-11

## 2024-07-07 NOTE — PHYSICAL THERAPY INITIAL EVALUATION ADULT - ORIENTATION, REHAB EVAL
Insurance Authorization for admission:   [facility]  Phone number: [phone]     Spoke to Winnie  4 days approved.  Level of care: 201/IP  Review on 7/8/2024.   Authorization # [id number]        COB:  [facility] [phone]  FAX CLINICAL UPON DISCHARGE    Eligibility Verification System checked - ([phone]).  Online system / automated system indicates: MA eligible in [facility]          
oriented to person, place, time and situation

## 2024-07-16 ENCOUNTER — APPOINTMENT (OUTPATIENT)
Dept: ELECTROPHYSIOLOGY | Facility: CLINIC | Age: 64
End: 2024-07-16

## 2024-08-05 NOTE — H&P ADULT - I WAS PHYSICALLY PRESENT FOR THE KEY PORTIONS OF THE EVALUATION AND MANAGEMENT (E/M) SERVICE PROVIDED.  I AGREE WITH THE ABOVE HISTORY, PHYSICAL, AND PLAN WHICH I HAVE REVIEWED AND EDITED WHERE APPROPRIATE
[No] : patient does not have a  history of radiation therapy [Tinnitus] : tinnitus [Hearing Loss] : hearing loss [de-identified] : 53 y/o male who came in complaining of hearing the ocean in the L ear that started in November after he blew his nose. He states when this happened he became dizzy but it subside shortly. He no longer has any dizzy spells. He states he has occasional nasal congestion. He states his hearing has been getting worse in L>R. He has no throat or nose issues. Pt has no ear pain, ear drainage, vertigo, nasal congestion, nasal discharge, epistaxis, sinus infections, facial pain, facial pressure, throat pain, dysphagia or fevers.\par    [FreeTextEntry1] : 8/5/2025 f/u exam for asymm SNHL MRI-wnl Pt suspects wax in the ears no other modifying factors no other nasal or throat complaints [Anxiety] : no anxiety [Dizziness] : no dizziness [Headache] : no headache [Otalgia] : no otalgia [Otorrhea] : no otorrhea [Vertigo] : no vertigo [Recurrent Otitis Media] : no recurrent otitis media [Otitis Media with Effusion] : no otitis media with effusion [Congenital Ear Malformation] : no congenital ear malformation [Meniere Disease] : no Meniere disease [Otosclerosis] : no otosclerosis [Perilymphatic Fistula] : no perilymphatic fistula [Hypertension] : no hypertension [Loud Noise Exposure] : no history of loud noise exposure [Smoking] : no smoking [Early Onset Hearing Loss] : no early onset hearing loss [Stroke] : no stroke [Facial Pain] : no facial pain [Clear Rhinorrhea] : no clear rhinorrhea [Facial Pressure] : no facial pressure [Purulent Rhinorrhea] : no purulent rhinorrhea [Nasal Congestion] : no nasal congestion [Postnasal Drainage] : no postnasal drainage [Ear Pain] : no ear pain [Ear Pressure] : no ear pressure [Diplopia] : no diplopia [Ear Fullness] : no ear fullness [Allergic Rhinitis] : no allergic rhinitis [Environmental Allergies] : no environmental allergies [Seasonal Allergies] : no seasonal allergies [Environmental Allergens] : no environmental allergens [Adenoidectomy] : no adenoidectomy [Allergies] : no allergies [Asthma] : no asthma [Neck Mass] : no neck mass [Neck Pain] : no neck pain [Chills] : no chills [Cold Intolerance] : no cold intolerance [Cough] : no cough [Fatigue] : no fatigue [Heat Intolerance] : no heat intolerance [Hyperthyroidism] : no hyperthyroidism [Sialadenitis] : no sialadenitis [Hodgkin Disease] : no hodgkin disease [Non-Hodgkin Lymphoma] : no non-hodgkin lymphoma [Tobacco Use] : no tobacco use [Alcohol Use] : no alcohol use [Graves Disease] : no graves disease [Thyroid Cancer] : no thyroid cancer Statement Selected

## 2024-08-21 ENCOUNTER — APPOINTMENT (OUTPATIENT)
Dept: ELECTROPHYSIOLOGY | Facility: CLINIC | Age: 64
End: 2024-08-21

## 2024-09-11 NOTE — DISCHARGE NOTE NURSING/CASE MANAGEMENT/SOCIAL WORK - NSDCPEFALRISK_GEN_ALL_CORE
For information on Fall & Injury Prevention, visit: [email or web address] OR  [email or web address] OR  https://www.cdc.gov/steadi/patient.html caffeine

## 2024-09-26 ENCOUNTER — APPOINTMENT (OUTPATIENT)
Dept: ELECTROPHYSIOLOGY | Facility: CLINIC | Age: 64
End: 2024-09-26

## 2025-05-08 NOTE — ED PROVIDER NOTE - NS_EDPROVIDERDISPOUSERTYPE_ED_A_ED
How Severe Is Your Skin Irritation?: mild Additional History: Patient reports texture change in the area. Denies burning or pain. Attending Attestation (For Attendings USE Only)...

## 2025-06-21 ENCOUNTER — INPATIENT (INPATIENT)
Facility: HOSPITAL | Age: 65
LOS: 2 days | Discharge: HOME | End: 2025-06-24
Attending: INTERNAL MEDICINE | Admitting: INTERNAL MEDICINE
Payer: MEDICAID

## 2025-06-21 VITALS
HEART RATE: 77 BPM | RESPIRATION RATE: 18 BRPM | WEIGHT: 128.09 LBS | OXYGEN SATURATION: 99 % | DIASTOLIC BLOOD PRESSURE: 80 MMHG | SYSTOLIC BLOOD PRESSURE: 148 MMHG | HEIGHT: 64 IN | TEMPERATURE: 98 F

## 2025-06-21 LAB
ADD ON TEST-SPECIMEN IN LAB: SIGNIFICANT CHANGE UP
ALBUMIN SERPL ELPH-MCNC: 4.1 G/DL — SIGNIFICANT CHANGE UP (ref 3.3–5)
ALP SERPL-CCNC: 77 U/L — SIGNIFICANT CHANGE UP (ref 40–120)
ALT FLD-CCNC: 12 U/L — SIGNIFICANT CHANGE UP (ref 4–41)
ANION GAP SERPL CALC-SCNC: 12 MMOL/L — SIGNIFICANT CHANGE UP (ref 7–14)
AST SERPL-CCNC: 14 U/L — SIGNIFICANT CHANGE UP (ref 4–40)
BASOPHILS # BLD AUTO: 0.07 K/UL — SIGNIFICANT CHANGE UP (ref 0–0.2)
BASOPHILS NFR BLD AUTO: 0.8 % — SIGNIFICANT CHANGE UP (ref 0–2)
BILIRUB SERPL-MCNC: 0.4 MG/DL — SIGNIFICANT CHANGE UP (ref 0.2–1.2)
BUN SERPL-MCNC: 18 MG/DL — SIGNIFICANT CHANGE UP (ref 7–23)
CALCIUM SERPL-MCNC: 9.6 MG/DL — SIGNIFICANT CHANGE UP (ref 8.4–10.5)
CHLORIDE SERPL-SCNC: 101 MMOL/L — SIGNIFICANT CHANGE UP (ref 98–107)
CO2 SERPL-SCNC: 25 MMOL/L — SIGNIFICANT CHANGE UP (ref 22–31)
CREAT SERPL-MCNC: 0.82 MG/DL — SIGNIFICANT CHANGE UP (ref 0.5–1.3)
EGFR: 97 ML/MIN/1.73M2 — SIGNIFICANT CHANGE UP
EGFR: 97 ML/MIN/1.73M2 — SIGNIFICANT CHANGE UP
EOSINOPHIL # BLD AUTO: 0.73 K/UL — HIGH (ref 0–0.5)
EOSINOPHIL NFR BLD AUTO: 8.3 % — HIGH (ref 0–6)
GLUCOSE SERPL-MCNC: 75 MG/DL — SIGNIFICANT CHANGE UP (ref 70–99)
HCT VFR BLD CALC: 37.7 % — LOW (ref 39–50)
HGB BLD-MCNC: 12.4 G/DL — LOW (ref 13–17)
IMM GRANULOCYTES # BLD AUTO: 0.03 K/UL — SIGNIFICANT CHANGE UP (ref 0–0.07)
IMM GRANULOCYTES NFR BLD AUTO: 0.3 % — SIGNIFICANT CHANGE UP (ref 0–0.9)
LIDOCAIN IGE QN: 32 U/L — SIGNIFICANT CHANGE UP (ref 7–60)
LYMPHOCYTES # BLD AUTO: 2.08 K/UL — SIGNIFICANT CHANGE UP (ref 1–3.3)
LYMPHOCYTES NFR BLD AUTO: 23.7 % — SIGNIFICANT CHANGE UP (ref 13–44)
MCHC RBC-ENTMCNC: 25.4 PG — LOW (ref 27–34)
MCHC RBC-ENTMCNC: 32.9 G/DL — SIGNIFICANT CHANGE UP (ref 32–36)
MCV RBC AUTO: 77.3 FL — LOW (ref 80–100)
MONOCYTES # BLD AUTO: 1.29 K/UL — HIGH (ref 0–0.9)
MONOCYTES NFR BLD AUTO: 14.7 % — HIGH (ref 2–14)
NEUTROPHILS # BLD AUTO: 4.56 K/UL — SIGNIFICANT CHANGE UP (ref 1.8–7.4)
NEUTROPHILS NFR BLD AUTO: 52.2 % — SIGNIFICANT CHANGE UP (ref 43–77)
NRBC # BLD AUTO: 0 K/UL — SIGNIFICANT CHANGE UP (ref 0–0)
NRBC # FLD: 0 K/UL — SIGNIFICANT CHANGE UP (ref 0–0)
NRBC BLD AUTO-RTO: 0 /100 WBCS — SIGNIFICANT CHANGE UP (ref 0–0)
PLATELET # BLD AUTO: 302 K/UL — SIGNIFICANT CHANGE UP (ref 150–400)
PMV BLD: 11.4 FL — SIGNIFICANT CHANGE UP (ref 7–13)
POTASSIUM SERPL-MCNC: 4.4 MMOL/L — SIGNIFICANT CHANGE UP (ref 3.5–5.3)
POTASSIUM SERPL-SCNC: 4.4 MMOL/L — SIGNIFICANT CHANGE UP (ref 3.5–5.3)
PROT SERPL-MCNC: 7.9 G/DL — SIGNIFICANT CHANGE UP (ref 6–8.3)
RBC # BLD: 4.88 M/UL — SIGNIFICANT CHANGE UP (ref 4.2–5.8)
RBC # FLD: 15.7 % — HIGH (ref 10.3–14.5)
SODIUM SERPL-SCNC: 138 MMOL/L — SIGNIFICANT CHANGE UP (ref 135–145)
TROPONIN T, HIGH SENSITIVITY RESULT: 10 NG/L — SIGNIFICANT CHANGE UP
TROPONIN T, HIGH SENSITIVITY RESULT: 11 NG/L — SIGNIFICANT CHANGE UP
WBC # BLD: 8.76 K/UL — SIGNIFICANT CHANGE UP (ref 3.8–10.5)
WBC # FLD AUTO: 8.76 K/UL — SIGNIFICANT CHANGE UP (ref 3.8–10.5)

## 2025-06-21 PROCEDURE — 99223 1ST HOSP IP/OBS HIGH 75: CPT

## 2025-06-21 PROCEDURE — 71046 X-RAY EXAM CHEST 2 VIEWS: CPT | Mod: 26

## 2025-06-21 RX ORDER — ASPIRIN 325 MG
81 TABLET ORAL DAILY
Refills: 0 | Status: DISCONTINUED | OUTPATIENT
Start: 2025-06-21 | End: 2025-06-24

## 2025-06-21 RX ORDER — MAGNESIUM, ALUMINUM HYDROXIDE 200-200 MG
30 TABLET,CHEWABLE ORAL ONCE
Refills: 0 | Status: COMPLETED | OUTPATIENT
Start: 2025-06-21 | End: 2025-06-21

## 2025-06-21 RX ADMIN — Medication 40 MILLIGRAM(S): at 18:43

## 2025-06-21 RX ADMIN — Medication 81 MILLIGRAM(S): at 23:12

## 2025-06-21 RX ADMIN — Medication 20 MILLIGRAM(S): at 18:43

## 2025-06-21 RX ADMIN — Medication 30 MILLILITER(S): at 18:44

## 2025-06-21 NOTE — ED ADULT NURSE NOTE - CHIEF COMPLAINT QUOTE
Pt with chest pain x 3 months states was seen at [facility] x one week ago  with no findings  but not admitted. pt still co pain , states pain increases with eating. Pt is DM BGL 93

## 2025-06-21 NOTE — ED PROVIDER NOTE - CLINICAL SUMMARY MEDICAL DECISION MAKING FREE TEXT BOX
65-year-old male with past medical history of DM, HTN, HLD, CAD, CVA greater than 10 years ago with no residual deficits, presents to the ED with complaints of chest pain.  States that the pain is midsternal and dull in nature.  States that it is worse after eating as well as with going up steps.  Reports it as a heavy feeling.  Patient states that the chest pain has been going on for 3 months, went to [facility] last week and had a cardiac workup that was negative.  States that he was not given anything to help with the pain.  Denies any headaches, nausea, vomiting, abdominal pain, shortness of breath, radiating symptoms.    Physical exam reveals well-appearing male, not in acute distress.  Chest wall nontender to palpation.  Lungs clear to auscultation bilaterally, heart auscultation unremarkable.  No abdominal tenderness.  Vitiligo noted diffusely.    Patient is a prior smoker and has multiple risk factors, elevated heart score given risk factors and age.  EKG shows normal sinus rhythm with no acute ST changes, similar to prior EKG done 2 years ago. Will obtain labs including troponin, chest x-ray.  Will treat with Pepcid, Maalox and Protonix given that symptoms are worse after meals.  Cannot exclude ACS given elevated heart score, worsening pain after exertion.  Likely to be observed for echo/stress test.

## 2025-06-21 NOTE — ED ADULT TRIAGE NOTE - CHIEF COMPLAINT QUOTE
Pt with chest pain x 3 months states was seen at [facility] x one week ago  with no findings  but not admitted. pt still co pain , states pain increases with eating. Pt is DM BGL [id number]

## 2025-06-21 NOTE — ED CDU PROVIDER INITIAL DAY NOTE - OBJECTIVE STATEMENT
65-year-old male with past medical history of DM, HTN, HLD, CAD, CVA greater than 10 years ago with no residual deficits, presents to the ED with complaints of chest pain.  States that the pain is midsternal and dull in nature.  States that it is worse after eating as well as with going up steps.  Reports it as a heavy feeling.  Patient states that the chest pain has been going on for 3 months, went to [facility] last week and had a cardiac workup that was negative.  States that he was not given anything to help with the pain.  Denies any headaches, nausea, vomiting, abdominal pain, shortness of breath, radiating symptoms.    CDU HANANE Yuan Note---  ED Provider HPI as above.  Pt is a 64 yo male, PMH CAD, HTN, HLD, DM2 (metformin), and prior CVA with no residual, presented to the ED for chest pain x months.  In the ED, VSS, EKG with no acute findings, c/w prior; labs/CXR with no emergent findings; pt was sent to CDU for continued care plan:  Tele monitoring, stress test, echo, Tele Doc of Day evaluation, general observation care / monitoring.

## 2025-06-21 NOTE — ED PROVIDER NOTE - ATTENDING CONTRIBUTION TO CARE
66 yo M with h/o DM, HTN, HLD, CAD, CVA greater than 10 years ago with no residual deficits who presents to the ED c/o 3 months of intermittent chest pain. He reports pain is brought on by eating (about 30 minutes after finishing a meal) and exertion. Pain with exertion is worse and has gotten more severe over the past 3 months. He went to [facility] ED last week, had blood work, EKG, and CXR and was discharged. Was not admitted and did not have further cardiac work-up done. Pt reports that since then pain has continued to worsen which prompted his visit to the ED today. Denies SOB, palpitations, LE swelling, fever, chills, abdominal pain, n/v/d. States he has never seen a cardiologist or had a stress test done. Last echo was several years ago. Concern for ACS, less likely GERD, pneumonia, pneumothorax. Plan for labs including troponin, CXR. Most likely CDU for stress/echo

## 2025-06-21 NOTE — ED CDU PROVIDER INITIAL DAY NOTE - CLINICAL SUMMARY MEDICAL DECISION MAKING FREE TEXT BOX
64 yo male, PMH CAD, HTN, HLD, DM2 (metformin), and prior CVA with no residual, presented to the ED for chest pain x months.  In the ED, VSS, EKG with no acute findings, c/w prior; labs/CXR with no emergent findings; pt was sent to CDU for continued care plan:  Tele monitoring, stress test, echo, Tele Doc of Day evaluation, general observation care / monitoring.

## 2025-06-21 NOTE — ED CDU PROVIDER INITIAL DAY NOTE - ATTENDING APP SHARED VISIT CONTRIBUTION OF CARE
64 yo M with h/o DM, HTN, HLD, CAD, CVA greater than 10 years ago with no residual deficits who presents to the ED c/o 3 months of intermittent chest pain. He reports pain is brought on by eating (about 30 minutes after finishing a meal) and exertion. Pain with exertion is worse and has gotten more severe over the past 3 months. He went to [facility] ED last week, had blood work, EKG, and CXR and was discharged. Was not admitted and did not have further cardiac work-up done. Pt reports that since then pain has continued to worsen which prompted his visit to the ED today. Denies SOB, palpitations, LE swelling, fever, chills, abdominal pain, n/v/d. States he has never seen a cardiologist or had a stress test done. Last echo was several years ago. In the ED, troponin flat, CXR clear. There is still concern for ACS given CP on exertion which improves with rest and large number of comorbidities. Placed in CDU for stress/echo, tele doc evaluation

## 2025-06-21 NOTE — ED ADULT NURSE NOTE - OBJECTIVE STATEMENT
A&Ox4. ambulatory. c/o midline non radiating chest pain for 3 months. pt states pain increases after eating. NAD. pt denies SOB,  dizziness, weakness, urinary symptoms, HA, n/v/d, fevers, chills, pain. respirations are even and unlabored. ABD is soft and nontender. skin intact. 20g placed to left forearm. safety precautions maintained.

## 2025-06-21 NOTE — ED ADULT NURSE REASSESSMENT NOTE - NS ED NURSE REASSESS COMMENT FT1
Pt resting in stretcher, A&O x 4, offers no complaints of pain or discomfort, RR equal and unlabored, NSR on monitor, report given to CDU RN Leandra, ambulatory to unit at this time. Yes

## 2025-06-21 NOTE — ED PROVIDER NOTE - PHYSICAL EXAMINATION
Shannon Owen DO (PGY1)   Physical Exam:    Gen: NAD, AOx3  Head: NCAT  HEENT: EOMI, PEERLA  Lung: CTAB, no respiratory distress, no wheezes/rhonchi/rales B/L  CV: RRR, no murmurs, rubs or gallops  Abd: soft, NT, ND, no guarding, no rigidity, no rebound tenderness, no CVA tenderness   MSK: no visible deformities, ROM normal in UE/LE, no back pain  Neuro: No focal sensory or motor deficits. Sensation intact to light touch all extremities.  Skin: Warm, well perfused, no rash, no leg swelling, diffuse vitiligo  Psych: normal affect, calm

## 2025-06-22 DIAGNOSIS — R94.39 ABNORMAL RESULT OF OTHER CARDIOVASCULAR FUNCTION STUDY: ICD-10-CM

## 2025-06-22 DIAGNOSIS — Z29.9 ENCOUNTER FOR PROPHYLACTIC MEASURES, UNSPECIFIED: ICD-10-CM

## 2025-06-22 DIAGNOSIS — E11.9 TYPE 2 DIABETES MELLITUS WITHOUT COMPLICATIONS: ICD-10-CM

## 2025-06-22 DIAGNOSIS — I20.9 ANGINA PECTORIS, UNSPECIFIED: ICD-10-CM

## 2025-06-22 DIAGNOSIS — E78.5 HYPERLIPIDEMIA, UNSPECIFIED: ICD-10-CM

## 2025-06-22 DIAGNOSIS — I10 ESSENTIAL (PRIMARY) HYPERTENSION: ICD-10-CM

## 2025-06-22 LAB
ADD ON TEST-SPECIMEN IN LAB: SIGNIFICANT CHANGE UP
GLUCOSE BLDC GLUCOMTR-MCNC: 115 MG/DL — HIGH (ref 70–99)
GLUCOSE BLDC GLUCOMTR-MCNC: 125 MG/DL — HIGH (ref 70–99)

## 2025-06-22 PROCEDURE — 99233 SBSQ HOSP IP/OBS HIGH 50: CPT

## 2025-06-22 PROCEDURE — 99223 1ST HOSP IP/OBS HIGH 75: CPT

## 2025-06-22 RX ORDER — ACETAMINOPHEN 500 MG/5ML
650 LIQUID (ML) ORAL EVERY 6 HOURS
Refills: 0 | Status: DISCONTINUED | OUTPATIENT
Start: 2025-06-22 | End: 2025-06-24

## 2025-06-22 RX ORDER — GLUCAGON 3 MG/1
1 POWDER NASAL ONCE
Refills: 0 | Status: DISCONTINUED | OUTPATIENT
Start: 2025-06-22 | End: 2025-06-24

## 2025-06-22 RX ORDER — DEXTROSE 50 % IN WATER 50 %
15 SYRINGE (ML) INTRAVENOUS ONCE
Refills: 0 | Status: DISCONTINUED | OUTPATIENT
Start: 2025-06-22 | End: 2025-06-24

## 2025-06-22 RX ORDER — DEXTROSE 50 % IN WATER 50 %
12.5 SYRINGE (ML) INTRAVENOUS ONCE
Refills: 0 | Status: DISCONTINUED | OUTPATIENT
Start: 2025-06-22 | End: 2025-06-24

## 2025-06-22 RX ORDER — LOSARTAN POTASSIUM 100 MG/1
50 TABLET, FILM COATED ORAL DAILY
Refills: 0 | Status: DISCONTINUED | OUTPATIENT
Start: 2025-06-22 | End: 2025-06-24

## 2025-06-22 RX ORDER — ATORVASTATIN CALCIUM 80 MG/1
80 TABLET, FILM COATED ORAL AT BEDTIME
Refills: 0 | Status: DISCONTINUED | OUTPATIENT
Start: 2025-06-22 | End: 2025-06-24

## 2025-06-22 RX ORDER — MELATONIN 5 MG
3 TABLET ORAL AT BEDTIME
Refills: 0 | Status: DISCONTINUED | OUTPATIENT
Start: 2025-06-22 | End: 2025-06-24

## 2025-06-22 RX ORDER — DEXTROSE 50 % IN WATER 50 %
25 SYRINGE (ML) INTRAVENOUS ONCE
Refills: 0 | Status: DISCONTINUED | OUTPATIENT
Start: 2025-06-22 | End: 2025-06-24

## 2025-06-22 RX ORDER — INSULIN LISPRO 100 U/ML
INJECTION, SOLUTION INTRAVENOUS; SUBCUTANEOUS AT BEDTIME
Refills: 0 | Status: DISCONTINUED | OUTPATIENT
Start: 2025-06-22 | End: 2025-06-23

## 2025-06-22 RX ORDER — SODIUM CHLORIDE 9 G/1000ML
1000 INJECTION, SOLUTION INTRAVENOUS
Refills: 0 | Status: DISCONTINUED | OUTPATIENT
Start: 2025-06-22 | End: 2025-06-24

## 2025-06-22 RX ORDER — INSULIN LISPRO 100 U/ML
INJECTION, SOLUTION INTRAVENOUS; SUBCUTANEOUS
Refills: 0 | Status: DISCONTINUED | OUTPATIENT
Start: 2025-06-22 | End: 2025-06-23

## 2025-06-22 RX ORDER — LOSARTAN POTASSIUM 100 MG/1
50 TABLET, FILM COATED ORAL ONCE
Refills: 0 | Status: COMPLETED | OUTPATIENT
Start: 2025-06-22 | End: 2025-06-22

## 2025-06-22 RX ADMIN — ATORVASTATIN CALCIUM 80 MILLIGRAM(S): 80 TABLET, FILM COATED ORAL at 20:48

## 2025-06-22 RX ADMIN — Medication 81 MILLIGRAM(S): at 11:03

## 2025-06-22 RX ADMIN — LOSARTAN POTASSIUM 50 MILLIGRAM(S): 100 TABLET, FILM COATED ORAL at 02:29

## 2025-06-22 NOTE — ED CDU PROVIDER SUBSEQUENT DAY NOTE - CLINICAL SUMMARY MEDICAL DECISION MAKING FREE TEXT BOX
66 yo male, PMH CAD, HTN, HLD, DM2 (metformin), and prior CVA with no residual, presented to the ED for chest pain x months.  In the ED, VSS, EKG with no acute findings, c/w prior; labs/CXR with no emergent findings; pt was sent to CDU for continued care plan:  Tele monitoring, stress test, echo, Tele Doc of Day evaluation, general observation care / monitoring.

## 2025-06-22 NOTE — ED CDU PROVIDER SUBSEQUENT DAY NOTE - ATTENDING APP SHARED VISIT CONTRIBUTION OF CARE
Jose Rodriguez DO:  patient seen and evaluated with the PA.  I was present for key portions of the History & Physical, and I agree with the Impression & Plan. 66 yo m pmh Type 2 DM, HTN, HLD, hx of CVA (10 yrs ago, no residual deficits), pw cp. In CDU for cardiac evaluation.  Denies feeling well today.  Pending results of stress/echo. Jose Rodriguez DO:  patient seen and evaluated with the PA.  I was present for key portions of the History & Physical, and I agree with the Impression & Plan. 66 yo m pmh Type 2 DM, HTN, HLD, hx of CVA (10 yrs ago, no residual deficits), pw cp. In CDU for cardiac evaluation.  feeling well today.  Pending results of stress/echo.

## 2025-06-22 NOTE — H&P ADULT - PROBLEM SELECTOR PLAN 3
- on atorvastatin 20mg at home,   - lipid profile in AM  - will increase dose to 80 mg in setting of abnormal stress test Yes

## 2025-06-22 NOTE — PATIENT PROFILE ADULT - STATED REASON FOR ADMISSION
Last INR 10/17 was 2.9. Patient took last dose of coumadin on 10/27, first lovenox on 10/28 ( no inr prior). On 10/30 afternoon he noticed a foot long 6 inch wide purple black bruised area under his belly button described as a smiley face. No blood in stool or urine. No obvious sign of bleeding. No headache dizziness sob, cp. Hernia surgery scheduled on 11/2. Do you want him to have labs?   thanks Chest Pain

## 2025-06-22 NOTE — ED CDU PROVIDER SUBSEQUENT DAY NOTE - WR ORDER ID 1
Thank you for allowing us to provide you with medical care today. We realize that you have many choices for your emergency care needs. We thank you for choosing [facility]. Please choose us in the future for any continued health care needs. The exam and treatment you received in the Emergency Department were for an emergent problem and are not intended as complete care. It is important that you follow up with a doctor, nurse practitioner, or physician's assistant for ongoing care. If your symptoms worsen or you do not improve as expected and you are unable to reach your usual health care provider, you should return to the Emergency Department. We are available 24 hours a day. Please make an appointment with your health care provider(s) for follow up of your Emergency Department visit. Take this sheet with you when you go to your follow-up visit. [id number]

## 2025-06-22 NOTE — H&P ADULT - PROBLEM SELECTOR PLAN 1
- presents with midsternal chest pain with progressive worsening over the last 3 months  - trop 10-->11 , EKG NSR  - TTE with normal EF 50% with normal LV systolic fucntion   - Cards consulted in CDU, recommending NST  - NST positive with medium-sized, severe defect(s) in the apex and distal inferior walls that are reversible suggestive of ischemia with focal distal inferior and apical hypokinesis  - patient planned for LHC in AM, make NPO at MN   - monitor on tele, if active chest pain would repeat Graham and STAT EKG

## 2025-06-22 NOTE — H&P ADULT - HISTORY OF PRESENT ILLNESS
no
66 y/o M with pmhx of Type 2 DM, HTN, HLD, hx of CVA (10 yrs ago, no residual deficits) who presents for chest pain. Patient states that he has had chest pain for the last 3 months. He states that he pain has been progressively worsening and becoming more pronounced. States that chest pain is midsternal and pressure like in nature. Pain is worsened with ambulation and states that he feels weak due to it. He denies any SOB, fevers, chills or headaches. he states that pain could last up to 5 minutes, claims that drink water help it and eat food would exacerbate pain. Denies any abdominal pain, constipation or diarrhea. He has nver had MI in past, denies any stents. Was previously on plavix for stroke but now on ASA 81 mg. Patient denies chest pain at time of interview.   Patient states that he take metformin as need for when his blood sugars is elevated, if sugars are 100 or less, patient does not take metformin.     In ED, VSS. CXR with clear lungs. Patient admitted to CDU with tele for monitoring. TTE was done with normal EF and LV systolic function.  Cardiology was consulted in CDU and patient panned for NST. NST abnormal and patient admitted for further evaluation.

## 2025-06-22 NOTE — H&P ADULT - ASSESSMENT
64 y/o M with pmhx of Type 2 DM, HTN, HLD, hx of CVA (10 yrs ago, no residual deficits) who presents for chest pain. Found to have abnormal stress test concerning for ischemia, pending LHC in AM.

## 2025-06-22 NOTE — H&P ADULT - NSHPPHYSICALEXAM_GEN_ALL_CORE
T(C): 36.7 (06-22-25 @ 12:22), Max: 36.8 (06-22-25 @ 02:03)  HR: 73 (06-22-25 @ 09:32) (56 - 105)  BP: 148/78 (06-22-25 @ 12:22) (147/81 - 179/83)  RR: 18 (06-22-25 @ 12:22) (17 - 18)  SpO2: 98% (06-22-25 @ 12:22) (98% - 100%)    CONSTITUTIONAL: Well groomed, no apparent distress  RESP: No respiratory distress, no use of accessory muscles; CTA b/l, no WRR  CV: RRR, +S1S2, no MRG   GI: Soft, NT, ND, no rebound, no guarding; no palpable masses   MSK: Normal ROM without pain, no spinal tenderness, normal muscle strength/tone  SKIN: No rashes or ulcers noted; no subcutaneous nodules or induration palpable  NEURO: no focal deficits   PSYCH: A+O x 3, mood and affect appropriate

## 2025-06-22 NOTE — ED ADULT NURSE REASSESSMENT NOTE - NS ED NURSE REASSESS COMMENT FT1
Break Coverage RN: received report from ABRAM Sheehan, pt remains A&Ox4 resting in stretcher. pt remains endorsing chest pain that worsens after eating. denies other medical complaints at this time. respirations even and unlabored on room air. NSR on monitor. pending stress test results. bed at lowest position, side rails are up. plan of care continued.

## 2025-06-22 NOTE — H&P ADULT - PROBLEM SELECTOR PLAN 4
- on metformin 500mg BID, patient states that he take metformin PRN based on sugar levels   - start on low dose ISS qmeals and at bedtime   - HgbA1c in AM  - nutrition consult

## 2025-06-22 NOTE — ED CDU PROVIDER DISPOSITION NOTE - ATTENDING CONTRIBUTION TO CARE
Jose Rodriguez DO:  patient seen and evaluated with the PA.  I was present for key portions of the History & Physical, and I agree with the Impression & Plan. 66 yo m pmh Type 2 DM, HTN, HLD, hx of CVA (10 yrs ago, no residual deficits), pw cp. In CDU for cardiac evaluation.  abnormal stress test, plan for admission.

## 2025-06-22 NOTE — CONSULT NOTE ADULT - SUBJECTIVE AND OBJECTIVE BOX
Cardiovascular Disease Initial Evaluation  DATE OF SERVICE: 06-22-25 @ 08:45    CHIEF COMPLAINT: Chest pain    HISTORY OF PRESENT ILLNESS:  This is a 65 year old man with DM, HTN, HLD, CAD, and CVA greater than 10 years ago who presented to [facility] on 6/21/2025 with complaints of chest pain.  States that the pain is midsternal and dull in nature.  States that it is worse after eating as well as with going up steps.  Reports it as a heavy feeling.  Patient states that the chest pain has been going on for 3 months. He went to [facility] last week and had a cardiac workup that was negative.  States that he was not given anything to help with the pain.  Denies any headaches, nausea, vomiting, abdominal pain, shortness of breath, radiating symptoms.        Allergies  No Known Allergies      	    MEDICATIONS:  aspirin enteric coated 81 milliGRAM(s) Oral daily                  PAST MEDICAL & SURGICAL HISTORY:  Hypertension, unspecified type      Hypercholesteremia      Diabetes mellitus      CAD (coronary artery disease)      No significant past surgical history          FAMILY HISTORY:  Family history of early CAD    FH: diabetes mellitus (Father, Mother)    FH: hypertension (Father, Mother)        SOCIAL HISTORY:    The patient is a nonsmoker       REVIEW OF SYSTEMS:  See HPI, otherwise complete 14 point review of systems negative        PHYSICAL EXAM:  T(C): 36.8 (06-22-25 @ 02:03), Max: 36.8 (06-22-25 @ 02:03)  HR: 63 (06-22-25 @ 04:32) (56 - 105)  BP: 160/71 (06-22-25 @ 04:32) (148/80 - 179/83)  RR: 17 (06-21-25 @ 22:55) (17 - 18)  SpO2: 100% (06-22-25 @ 04:32) (98% - 100%)  Wt(kg): --  I&O's Summary      Appearance: No Acute Distress; resting comfortably  HEENT:  Normal oral mucosa, PERRL, EOMI	  Cardiovascular: Normal S1 S2, No JVD, No murmurs/rubs/gallops  Respiratory: Normal respiratory effort; Lungs clear to auscultation bilaterally  Gastrointestinal:  Soft, Non-tender, + BS	  Skin: No rashes, No ecchymoses, No cyanosis	  Neurologic: Non-focal; no weakness  Extremities: No clubbing, cyanosis or edema  Vascular: Peripheral pulses palpable 2+ bilaterally  Psychiatry: A & O x 3, Mood & affect appropriate    Laboratory Data:	 	    CBC Full  -  ( 21 Jun 2025 18:47 )  WBC Count : 8.76 K/uL  Hemoglobin : 12.4 g/dL  Hematocrit : 37.7 %  Platelet Count - Automated : 302 K/uL  Mean Cell Volume : 77.3 fl  Mean Cell Hemoglobin : 25.4 pg  Mean Cell Hemoglobin Concentration : 32.9 g/dL  Auto Neutrophil # : 4.56 K/uL  Auto Lymphocyte # : 2.08 K/uL  Auto Monocyte # : 1.29 K/uL  Auto Eosinophil # : 0.73 K/uL  Auto Basophil # : 0.07 K/uL  Auto Neutrophil % : 52.2 %  Auto Lymphocyte % : 23.7 %  Auto Monocyte % : 14.7 %  Auto Eosinophil % : 8.3 %  Auto Basophil % : 0.8 %    06-21    138  |  101  |  18  ----------------------------<  75  4.4   |  25  |  0.82    Ca    9.6      21 Jun 2025 18:47    TPro  7.9  /  Alb  4.1  /  TBili  0.4  /  DBili  x   /  AST  14  /  ALT  12  /  AlkPhos  77  06-21      Interpretation of Telemetry: Sinus 	    ECG:  	Normal sinus rhythm    Assessment: 65 year old man with DM, HTN, HLD, CAD, and CVA presents with chest pain.     Plan of Care:    #Chest pain-  Mr. Ro has ruled out for ACS and EKG is nonischemic.   He has risk factors for CAD.   Awaiting echo and NST.  No cardiac objection to discharge planning if the above work up is negative.    Care discussed with the patient in the echo lab.  He agrees to the plan.    Outpatient f/u with Dr. Tanner Lopez.     46 minutes spent on total encounter; 100% of the visit was spent counseling and/or coordinating care by the attending physician.   	  Raymon Marcos MD [facility]  Cardiovascular Diseases  [phone]

## 2025-06-22 NOTE — ED CDU PROVIDER DISPOSITION NOTE - CLINICAL COURSE
HANANE Wallace: 65-year-old male with a past medical history of diabetes, hypertension, hyperlipidemia, CAD, CVA presenting to the emergency department complaining of chest pain. Patient was placed in CDU for cardiac monitoring, echo, stress test and TeleDoc.  Received a call from Cardiology Dr Kramer advised pt with abnormal stress test, s/w Dr Marcos advised to admit to his service.  Pt resting comfortably, NAD, pt denies chest pain, sob or any complaints at present time. Pt aware and agreeable with plan.

## 2025-06-22 NOTE — H&P ADULT - NSHPLABSRESULTS_GEN_ALL_CORE
12.4   8.76  )-----------( 302      ( 21 Jun 2025 18:47 )             37.7       06-21    138  |  101  |  18  ----------------------------<  75  4.4   |  25  |  0.82    Ca    9.6      21 Jun 2025 18:47    TPro  7.9  /  Alb  4.1  /  TBili  0.4  /  DBili  x   /  AST  14  /  ALT  12  /  AlkPhos  77  06-21              Urinalysis Basic - ( 21 Jun 2025 18:47 )    Color: x / Appearance: x / SG: x / pH: x  Gluc: 75 mg/dL / Ketone: x  / Bili: x / Urobili: x   Blood: x / Protein: x / Nitrite: x   Leuk Esterase: x / RBC: x / WBC x   Sq Epi: x / Non Sq Epi: x / Bacteria: x            Lactate Trend            CAPILLARY BLOOD GLUCOSE      POCT Blood Glucose.: 115 mg/dL (22 Jun 2025 16:39)

## 2025-06-22 NOTE — H&P ADULT - NSICDXPASTMEDICALHX_GEN_ALL_CORE_FT
PAST MEDICAL HISTORY:  CAD (coronary artery disease)     Diabetes mellitus     History of CVA in adulthood     Hypercholesteremia     Hypertension, unspecified type

## 2025-06-22 NOTE — ED CDU PROVIDER SUBSEQUENT DAY NOTE - HISTORY
66 yo male, PMH CAD, HTN, HLD, DM2 (metformin), and prior CVA with no residual, presented to the ED for chest pain x months.  In the ED, VSS, EKG with no acute findings, c/w prior; labs/CXR with no emergent findings; pt was sent to CDU for continued care plan:  Tele monitoring, stress test, echo, Tele Doc of Day evaluation, general observation care / monitoring.  In the interim, pt objectively noted to be resting comfortably; pt has been clinically stable; no issues thus far.

## 2025-06-23 LAB
A1C WITH ESTIMATED AVERAGE GLUCOSE RESULT: 6.3 % — HIGH (ref 4–5.6)
ALBUMIN SERPL ELPH-MCNC: 3.7 G/DL — SIGNIFICANT CHANGE UP (ref 3.3–5)
ALP SERPL-CCNC: 71 U/L — SIGNIFICANT CHANGE UP (ref 40–120)
ALT FLD-CCNC: 10 U/L — SIGNIFICANT CHANGE UP (ref 4–41)
ANION GAP SERPL CALC-SCNC: 12 MMOL/L — SIGNIFICANT CHANGE UP (ref 7–14)
AST SERPL-CCNC: 9 U/L — SIGNIFICANT CHANGE UP (ref 4–40)
BILIRUB SERPL-MCNC: 0.4 MG/DL — SIGNIFICANT CHANGE UP (ref 0.2–1.2)
BUN SERPL-MCNC: 19 MG/DL — SIGNIFICANT CHANGE UP (ref 7–23)
CALCIUM SERPL-MCNC: 9 MG/DL — SIGNIFICANT CHANGE UP (ref 8.4–10.5)
CHLORIDE SERPL-SCNC: 105 MMOL/L — SIGNIFICANT CHANGE UP (ref 98–107)
CHOLEST SERPL-MCNC: 116 MG/DL — SIGNIFICANT CHANGE UP
CO2 SERPL-SCNC: 22 MMOL/L — SIGNIFICANT CHANGE UP (ref 22–31)
CREAT SERPL-MCNC: 0.8 MG/DL — SIGNIFICANT CHANGE UP (ref 0.5–1.3)
EGFR: 98 ML/MIN/1.73M2 — SIGNIFICANT CHANGE UP
EGFR: 98 ML/MIN/1.73M2 — SIGNIFICANT CHANGE UP
ESTIMATED AVERAGE GLUCOSE: 134 — SIGNIFICANT CHANGE UP
GLUCOSE BLDC GLUCOMTR-MCNC: 104 MG/DL — HIGH (ref 70–99)
GLUCOSE BLDC GLUCOMTR-MCNC: 131 MG/DL — HIGH (ref 70–99)
GLUCOSE BLDC GLUCOMTR-MCNC: 159 MG/DL — HIGH (ref 70–99)
GLUCOSE BLDC GLUCOMTR-MCNC: 174 MG/DL — HIGH (ref 70–99)
GLUCOSE BLDC GLUCOMTR-MCNC: 76 MG/DL — SIGNIFICANT CHANGE UP (ref 70–99)
GLUCOSE BLDC GLUCOMTR-MCNC: 96 MG/DL — SIGNIFICANT CHANGE UP (ref 70–99)
GLUCOSE SERPL-MCNC: 118 MG/DL — HIGH (ref 70–99)
HCT VFR BLD CALC: 39.5 % — SIGNIFICANT CHANGE UP (ref 39–50)
HDLC SERPL-MCNC: 41 MG/DL — SIGNIFICANT CHANGE UP
HGB BLD-MCNC: 12.7 G/DL — LOW (ref 13–17)
LDLC SERPL-MCNC: 64 MG/DL — SIGNIFICANT CHANGE UP
LIPID PNL WITH DIRECT LDL SERPL: 64 MG/DL — SIGNIFICANT CHANGE UP
MCHC RBC-ENTMCNC: 24.6 PG — LOW (ref 27–34)
MCHC RBC-ENTMCNC: 32.2 G/DL — SIGNIFICANT CHANGE UP (ref 32–36)
MCV RBC AUTO: 76.4 FL — LOW (ref 80–100)
NONHDLC SERPL-MCNC: 75 MG/DL — SIGNIFICANT CHANGE UP
NRBC # BLD AUTO: 0 K/UL — SIGNIFICANT CHANGE UP (ref 0–0)
NRBC # FLD: 0 K/UL — SIGNIFICANT CHANGE UP (ref 0–0)
NRBC BLD AUTO-RTO: 0 /100 WBCS — SIGNIFICANT CHANGE UP (ref 0–0)
PLATELET # BLD AUTO: 309 K/UL — SIGNIFICANT CHANGE UP (ref 150–400)
PMV BLD: 10.8 FL — SIGNIFICANT CHANGE UP (ref 7–13)
POTASSIUM SERPL-MCNC: 4 MMOL/L — SIGNIFICANT CHANGE UP (ref 3.5–5.3)
POTASSIUM SERPL-SCNC: 4 MMOL/L — SIGNIFICANT CHANGE UP (ref 3.5–5.3)
PROT SERPL-MCNC: 7.4 G/DL — SIGNIFICANT CHANGE UP (ref 6–8.3)
RBC # BLD: 5.17 M/UL — SIGNIFICANT CHANGE UP (ref 4.2–5.8)
RBC # FLD: 15.5 % — HIGH (ref 10.3–14.5)
SODIUM SERPL-SCNC: 139 MMOL/L — SIGNIFICANT CHANGE UP (ref 135–145)
TRIGL SERPL-MCNC: 50 MG/DL — SIGNIFICANT CHANGE UP
WBC # BLD: 7.43 K/UL — SIGNIFICANT CHANGE UP (ref 3.8–10.5)
WBC # FLD AUTO: 7.43 K/UL — SIGNIFICANT CHANGE UP (ref 3.8–10.5)

## 2025-06-23 PROCEDURE — 93458 L HRT ARTERY/VENTRICLE ANGIO: CPT | Mod: 26,59

## 2025-06-23 PROCEDURE — 92928 PRQ TCAT PLMT NTRAC ST 1 LES: CPT | Mod: RC

## 2025-06-23 PROCEDURE — 93010 ELECTROCARDIOGRAM REPORT: CPT

## 2025-06-23 RX ORDER — DEXTROSE 50 % IN WATER 50 %
25 SYRINGE (ML) INTRAVENOUS ONCE
Refills: 0 | Status: COMPLETED | OUTPATIENT
Start: 2025-06-23 | End: 2025-06-23

## 2025-06-23 RX ORDER — CLOPIDOGREL BISULFATE 75 MG/1
75 TABLET, FILM COATED ORAL DAILY
Refills: 0 | Status: DISCONTINUED | OUTPATIENT
Start: 2025-06-24 | End: 2025-06-24

## 2025-06-23 RX ORDER — INSULIN LISPRO 100 U/ML
INJECTION, SOLUTION INTRAVENOUS; SUBCUTANEOUS EVERY 6 HOURS
Refills: 0 | Status: DISCONTINUED | OUTPATIENT
Start: 2025-06-23 | End: 2025-06-24

## 2025-06-23 RX ADMIN — Medication 25 GRAM(S): at 11:45

## 2025-06-23 RX ADMIN — Medication 100 MILLILITER(S): at 13:54

## 2025-06-23 RX ADMIN — INSULIN LISPRO 1: 100 INJECTION, SOLUTION INTRAVENOUS; SUBCUTANEOUS at 17:32

## 2025-06-23 RX ADMIN — LOSARTAN POTASSIUM 50 MILLIGRAM(S): 100 TABLET, FILM COATED ORAL at 06:10

## 2025-06-23 RX ADMIN — ATORVASTATIN CALCIUM 80 MILLIGRAM(S): 80 TABLET, FILM COATED ORAL at 22:00

## 2025-06-23 RX ADMIN — Medication 81 MILLIGRAM(S): at 11:53

## 2025-06-23 RX ADMIN — Medication 40 MILLIGRAM(S): at 22:00

## 2025-06-23 NOTE — DIETITIAN INITIAL EVALUATION ADULT - NS FNS DIET ORDER
Diet, DASH/TLC:   Sodium & Cholesterol Restricted     Special Instructions for Nursing:  Sodium & Cholesterol Restricted (06-23-25 @ 13:31)

## 2025-06-23 NOTE — DIETITIAN INITIAL EVALUATION ADULT - REASON FOR ADMISSION
chest pain  Per chart, Pt is 64 y/o M with pmhx of Type 2 DM, HTN, HLD, hx of CVA (10 yrs ago, no residual deficits) who presents for chest pain. Found to have abnormal stress test concerning for ischemia, pending LHC in AM.

## 2025-06-23 NOTE — PROGRESS NOTE ADULT - SUBJECTIVE AND OBJECTIVE BOX
Cardiovascular Disease Progress Note  DATE OF SERVICE: 06-23-25 @ 08:20    Overnight events: No acute events overnight.    The patient denies pain currently.   Otherwise review of systems negative    Objective Findings:  T(C): 36.7 (06-23-25 @ 06:05), Max: 37.3 (06-23-25 @ 02:17)  HR: 71 (06-23-25 @ 06:05) (64 - 76)  BP: 136/78 (06-23-25 @ 06:05) (136/78 - 186/93)  RR: 18 (06-23-25 @ 06:05) (16 - 18)  SpO2: 99% (06-23-25 @ 06:05) (95% - 100%)  Wt(kg): --  Daily     Daily       Physical Exam:  Gen: NAD; Patient resting comfortably  HEENT: EOMI, Normocephalic/ atraumatic  CV: RRR, normal S1 + S2, no m/r/g  Lungs:  Normal respiratory effort; clear to auscultation bilaterally  Abd: soft, non-tender; bowel sounds present  Ext: No edema; warm and well perfused    Telemetry: n/a    Laboratory Data:                        12.7   7.43  )-----------( 309      ( 23 Jun 2025 05:06 )             39.5     06-23    139  |  105  |  19  ----------------------------<  118[H]  4.0   |  22  |  0.80    Ca    9.0      23 Jun 2025 05:06    TPro  7.4  /  Alb  3.7  /  TBili  0.4  /  DBili  x   /  AST  9   /  ALT  10  /  AlkPhos  71  06-23              Inpatient Medications:  MEDICATIONS  (STANDING):  aspirin enteric coated 81 milliGRAM(s) Oral daily  atorvastatin 80 milliGRAM(s) Oral at bedtime  dextrose 5%. 1000 milliLiter(s) (100 mL/Hr) IV Continuous <Continuous>  dextrose 5%. 1000 milliLiter(s) (50 mL/Hr) IV Continuous <Continuous>  dextrose 50% Injectable 25 Gram(s) IV Push once  dextrose 50% Injectable 12.5 Gram(s) IV Push once  dextrose 50% Injectable 25 Gram(s) IV Push once  glucagon  Injectable 1 milliGRAM(s) IntraMuscular once  insulin lispro (ADMELOG) corrective regimen sliding scale   SubCutaneous every 6 hours  losartan 50 milliGRAM(s) Oral daily      Assessment:  65 year old man with DM, HTN, HLD, CAD, and CVA presents with chest pain.     Plan of Care:    #Chest pain-  No acute findings on echo, but NST is notable for inferior inducible ischemia.  Mr. Ro is agreeable to cath today.      #HTN-  BP acceptable.         Over 55 minutes spent on total encounter; 100% of the visit was spent counseling and/or coordinating care by the attending physician.      Raymon Marcos MD [facility]  Cardiovascular Disease  [phone]

## 2025-06-23 NOTE — CHART NOTE - NSCHARTNOTEFT_GEN_A_CORE
Overnight Medicine ACP Coverage    Patient is s/p cardiac cath via right radial access. Patient without any complaints. Site is stable with no hematoma or active bleed noted. No swelling, dressing is clean/intact/dry. Right Radial pulse palpable.  strength is equal bilaterally. Sensation intact bilaterally. Patient has full ROM in right wrist. Capillary refill < 2 seconds.     T(C): 36.7 (06-23-25 @ 18:10), Max: 37.3 (06-23-25 @ 02:17)  HR: 72 (06-23-25 @ 19:04) (53 - 72)  BP: 149/72 (06-23-25 @ 18:10) (136/78 - 186/93)  RR: 18 (06-23-25 @ 18:10) (12 - 20)  SpO2: 98% (06-23-25 @ 18:10) (96% - 100%)    Educated patient to inform nursing for any numbness and/or tingling of extremities, signs of bleeding, chest pain, shortness of breath or any other concerning symptoms. Patient verbalized understanding. Will continue to monitor.    Mauricio Love PA-C  Department of Medicine  [facility]   In House Page [phone]

## 2025-06-23 NOTE — DIETITIAN INITIAL EVALUATION ADULT - OTHER INFO
Unable to conduct nutrition interview secondary to Pt not present. Information obtained from comprehensive chart review and per RN today. New admission, Pt is order for NPO. No intake reported per RN flowsheets. Continue to monitor and document PO in flowsheets. No chewing or swallowing difficulties at this time per chart. Of note, Pt diet advanced to DASH/TLC. No GI distress (nausea/vomiting/diarrhea/constipation) per chart. Unknown last BM. Recommend to continue to monitor and document BMs in RN flowsheet. No edema, no PI/DTI noted, per RN flowsheet. Labs noted for HgA1c 6.3% which indicates good glycemic control. RD remains available upon request and will follow up per protocol.

## 2025-06-23 NOTE — DIETITIAN INITIAL EVALUATION ADULT - PERTINENT MEDS FT
MEDICATIONS  (STANDING):  aspirin enteric coated 81 milliGRAM(s) Oral daily  atorvastatin 80 milliGRAM(s) Oral at bedtime  dextrose 5%. 1000 milliLiter(s) (100 mL/Hr) IV Continuous <Continuous>  dextrose 5%. 1000 milliLiter(s) (50 mL/Hr) IV Continuous <Continuous>  dextrose 50% Injectable 25 Gram(s) IV Push once  dextrose 50% Injectable 12.5 Gram(s) IV Push once  dextrose 50% Injectable 25 Gram(s) IV Push once  glucagon  Injectable 1 milliGRAM(s) IntraMuscular once  insulin lispro (ADMELOG) corrective regimen sliding scale   SubCutaneous every 6 hours  losartan 50 milliGRAM(s) Oral daily  sodium chloride 0.9%. 500 milliLiter(s) (100 mL/Hr) IV Continuous <Continuous>    MEDICATIONS  (PRN):  acetaminophen     Tablet .. 650 milliGRAM(s) Oral every 6 hours PRN Temp greater or equal to 38C (100.4F), Mild Pain (1 - 3)  dextrose Oral Gel 15 Gram(s) Oral once PRN Blood Glucose LESS THAN 70 milliGRAM(s)/deciliter  melatonin 3 milliGRAM(s) Oral at bedtime PRN Insomnia

## 2025-06-23 NOTE — DIETITIAN INITIAL EVALUATION ADULT - PERTINENT LABORATORY DATA
06-23    139  |  105  |  19  ----------------------------<  118[H]  4.0   |  22  |  0.80    Ca    9.0      23 Jun 2025 05:06    TPro  7.4  /  Alb  3.7  /  TBili  0.4  /  DBili  x   /  AST  9   /  ALT  10  /  AlkPhos  71  06-23  CAPILLARY BLOOD GLUCOSE  POCT Blood Glucose.: 96 mg/dL (23 Jun 2025 13:32)  POCT Blood Glucose.: 174 mg/dL (23 Jun 2025 11:59)  POCT Blood Glucose.: 76 mg/dL (23 Jun 2025 11:44)  POCT Blood Glucose.: 131 mg/dL (23 Jun 2025 06:00)  POCT Blood Glucose.: 104 mg/dL (23 Jun 2025 00:35)  POCT Blood Glucose.: 125 mg/dL (22 Jun 2025 21:26)  POCT Blood Glucose.: 115 mg/dL (22 Jun 2025 16:39)    A1C with Estimated Average Glucose Result: 6.3 % (06-23-25 @ 05:06)  A1C with Estimated Average Glucose Result: 6.3 % (06-21-25 @ 18:47)

## 2025-06-23 NOTE — DIETITIAN INITIAL EVALUATION ADULT - PROBLEM SELECTOR PLAN 3
- on atorvastatin 20mg at home,   - lipid profile in AM  - will increase dose to 80 mg in setting of abnormal stress test

## 2025-06-23 NOTE — PRE PROCEDURE NOTE - PRE PROCEDURE EVALUATION
Pre Procedural Sedation Evaluation    Proceduralist: Dr. Keyla Rivers     History and physical reviewed  Medications reviewed  Labs reviewed  EKG reviewed    Anticoagulation: None   Urine pregnancy: N/A  Dentures: None  Last PO intake: 06/22/25 1200     Pre-Procedure Physical Assessment  Mental status: Alert and oriented x 3  Level of consciousness: 1  Cardiac assessment: Stable  Pulmonary assessment: Stable  Obstructive sleep apnea: No  Aspiration risk: No  Mallampati score: 2  ASA Classification: 2  Prior Sedative or Anesthesia Experience: No complications  Informed consent by responsible adult: Yes  Based on today's assessment, anesthesia consult requested: No Pre Procedural Sedation Evaluation    Proceduralist: Dr. Willy Flores/Dr. Keyla Rivers     History and physical reviewed  Medications reviewed  Labs reviewed  EKG reviewed    Anticoagulation: None   Urine pregnancy: N/A  Dentures: None  Last PO intake: 06/22/25 1200     Pre-Procedure Physical Assessment  Mental status: Alert and oriented x 3  Level of consciousness: 1  Cardiac assessment: Stable  Pulmonary assessment: Stable  Obstructive sleep apnea: No  Aspiration risk: No  Mallampati score: 2  ASA Classification: 2  Prior Sedative or Anesthesia Experience: No complications  Informed consent by responsible adult: Yes  Based on today's assessment, anesthesia consult requested: No

## 2025-06-23 NOTE — DIETITIAN INITIAL EVALUATION ADULT - ADD RECOMMEND
- Advance diet as medical feasible. Defer food and fluid consistency to SLP/Team.  - Please consistently document % PO intake in nursing flowsheets to assess adequacy of nutritional intake/monitor need for further nutritional intervention(s).   - Monitor weights, diet tolerance, skin integrity, BMs, pertinent labs.   - RDN services remain available as needed.

## 2025-06-23 NOTE — DIETITIAN INITIAL EVALUATION ADULT - ORAL INTAKE PTA/DIET HISTORY
Attempted to see patient twice. Pt is off of unit for test/procedure. Per chart, NKFA, no food intolerance. HIE wt hx: 60.1kg (Aug, 2023). ABW 58.1kg (6/21/25). Wt relatively stable.

## 2025-06-24 ENCOUNTER — TRANSCRIPTION ENCOUNTER (OUTPATIENT)
Age: 65
End: 2025-06-24

## 2025-06-24 VITALS
TEMPERATURE: 98 F | OXYGEN SATURATION: 98 % | RESPIRATION RATE: 18 BRPM | HEART RATE: 70 BPM | SYSTOLIC BLOOD PRESSURE: 136 MMHG | DIASTOLIC BLOOD PRESSURE: 77 MMHG

## 2025-06-24 LAB
ANION GAP SERPL CALC-SCNC: 10 MMOL/L — SIGNIFICANT CHANGE UP (ref 7–14)
BUN SERPL-MCNC: 18 MG/DL — SIGNIFICANT CHANGE UP (ref 7–23)
CALCIUM SERPL-MCNC: 9.5 MG/DL — SIGNIFICANT CHANGE UP (ref 8.4–10.5)
CHLORIDE SERPL-SCNC: 104 MMOL/L — SIGNIFICANT CHANGE UP (ref 98–107)
CO2 SERPL-SCNC: 23 MMOL/L — SIGNIFICANT CHANGE UP (ref 22–31)
CREAT SERPL-MCNC: 0.86 MG/DL — SIGNIFICANT CHANGE UP (ref 0.5–1.3)
EGFR: 96 ML/MIN/1.73M2 — SIGNIFICANT CHANGE UP
EGFR: 96 ML/MIN/1.73M2 — SIGNIFICANT CHANGE UP
GLUCOSE BLDC GLUCOMTR-MCNC: 108 MG/DL — HIGH (ref 70–99)
GLUCOSE BLDC GLUCOMTR-MCNC: 141 MG/DL — HIGH (ref 70–99)
GLUCOSE SERPL-MCNC: 114 MG/DL — HIGH (ref 70–99)
HCT VFR BLD CALC: 41 % — SIGNIFICANT CHANGE UP (ref 39–50)
HGB BLD-MCNC: 13.1 G/DL — SIGNIFICANT CHANGE UP (ref 13–17)
MAGNESIUM SERPL-MCNC: 2 MG/DL — SIGNIFICANT CHANGE UP (ref 1.6–2.6)
MCHC RBC-ENTMCNC: 24.4 PG — LOW (ref 27–34)
MCHC RBC-ENTMCNC: 32 G/DL — SIGNIFICANT CHANGE UP (ref 32–36)
MCV RBC AUTO: 76.5 FL — LOW (ref 80–100)
NRBC # BLD AUTO: 0 K/UL — SIGNIFICANT CHANGE UP (ref 0–0)
NRBC # FLD: 0 K/UL — SIGNIFICANT CHANGE UP (ref 0–0)
NRBC BLD AUTO-RTO: 0 /100 WBCS — SIGNIFICANT CHANGE UP (ref 0–0)
PHOSPHATE SERPL-MCNC: 3.1 MG/DL — SIGNIFICANT CHANGE UP (ref 2.5–4.5)
PLATELET # BLD AUTO: 330 K/UL — SIGNIFICANT CHANGE UP (ref 150–400)
PMV BLD: 10.7 FL — SIGNIFICANT CHANGE UP (ref 7–13)
POTASSIUM SERPL-MCNC: 4.1 MMOL/L — SIGNIFICANT CHANGE UP (ref 3.5–5.3)
POTASSIUM SERPL-SCNC: 4.1 MMOL/L — SIGNIFICANT CHANGE UP (ref 3.5–5.3)
RBC # BLD: 5.36 M/UL — SIGNIFICANT CHANGE UP (ref 4.2–5.8)
RBC # FLD: 15.6 % — HIGH (ref 10.3–14.5)
SODIUM SERPL-SCNC: 137 MMOL/L — SIGNIFICANT CHANGE UP (ref 135–145)
WBC # BLD: 7.21 K/UL — SIGNIFICANT CHANGE UP (ref 3.8–10.5)
WBC # FLD AUTO: 7.21 K/UL — SIGNIFICANT CHANGE UP (ref 3.8–10.5)

## 2025-06-24 RX ORDER — CLOPIDOGREL BISULFATE 75 MG/1
1 TABLET, FILM COATED ORAL
Qty: 30 | Refills: 2
Start: 2025-06-24 | End: 2025-09-21

## 2025-06-24 RX ORDER — ATORVASTATIN CALCIUM 80 MG/1
1 TABLET, FILM COATED ORAL
Qty: 30 | Refills: 0
Start: 2025-06-24 | End: 2025-07-23

## 2025-06-24 RX ORDER — CLOPIDOGREL BISULFATE 75 MG/1
1 TABLET, FILM COATED ORAL
Qty: 30 | Refills: 0
Start: 2025-06-24 | End: 2025-07-23

## 2025-06-24 RX ORDER — INSULIN LISPRO 100 U/ML
INJECTION, SOLUTION INTRAVENOUS; SUBCUTANEOUS
Refills: 0 | Status: DISCONTINUED | OUTPATIENT
Start: 2025-06-24 | End: 2025-06-24

## 2025-06-24 RX ADMIN — CLOPIDOGREL BISULFATE 75 MILLIGRAM(S): 75 TABLET, FILM COATED ORAL at 11:14

## 2025-06-24 RX ADMIN — LOSARTAN POTASSIUM 50 MILLIGRAM(S): 100 TABLET, FILM COATED ORAL at 06:10

## 2025-06-24 RX ADMIN — Medication 81 MILLIGRAM(S): at 11:14

## 2025-06-24 NOTE — DISCHARGE NOTE PROVIDER - HOSPITAL COURSE
66 y/o M with pmhx of Type 2 DM, HTN, HLD, hx of CVA (10 yrs ago, no residual deficits) who presents for chest pain. Found to have abnormal stress test concerning for ischemia, pending [facility]   #Angina pectoris.    - presents with midsternal chest pain with progressive worsening over the last 3 months  - trop 10-->11 , EKG NSR  - TTE with normal EF 60% with normal LV systolic fucntion   - Cards consulted in CDU, recommending NST  - NST positive with medium-sized, severe defect(s) in the apex and distal inferior walls that are reversible suggestive of ischemia with focal distal inferior and apical hypokinesis  - patient planned for [facility]   - monitor on tele, if active chest pain would repeat Graham and STAT EKG.  -6/23 CATH: [place] frontier stent pRCA 80, RPLS 50-60; RRA access   ASA/Plavix     #Hypertension.    - on losartan 50mg at home, BP elevated 147/81- 160/85  - will resume home meds.    #Hyperlipidemia.   : - on atorvastatin 20mg at home,   -  increased dose to 80 mg in setting of abnormal stress test.    #Type 2 diabetes mellitus.    - on metformin 500mg BID, patient states that he take metformin PRN based on sugar levels   - start on low dose ISS qmeals and at bedtime   - HgbA1c - 6.3   66 y/o M with pmhx of Type 2 DM, HTN, HLD, hx of CVA (10 yrs ago, no residual deficits) who presents for chest pain. Found to have abnormal stress test concerning for ischemia, pending [facility]   #Angina pectoris.    - presents with midsternal chest pain with progressive worsening over the last 3 months  - trop 10-->11 , EKG NSR  - TTE with normal EF 60% with normal LV systolic fucntion   - Cards consulted in CDU, recommending NST  - NST positive with medium-sized, severe defect(s) in the apex and distal inferior walls that are reversible suggestive of ischemia with focal distal inferior and apical hypokinesis  - patient planned for [facility]   - monitor on tele, if active chest pain would repeat Graham and STAT EKG.  -6/23 CATH: akin frontier stent pRCA 80, RPLS 50-60; RRA access   ASA/Plavix  HDS, VSS  - EKG: SR, no acute ischemic changes  - A1C 6.3, LDL 64  - Access site stable w/o hematoma or bleed  - continue ASA 81mg daily, Plavix 75mg daily for DAPT for 1 year (please send script for DAPT, 30 day supply with 2 refills)  - continue Atorvastatin 80mg daily  - continue Losartan 50mg daily  - Hold Metformin for 3 days to prevent Lactic Acidosis, can restart on Friday, 6/27/25  - Monitor electrolytes, replete to keep K to 4 and Mg to 2    - Educated patient on importance of compliance on antiplatelet therapy.  Additionally, discharge medications, access site restrictions, dietary changes/exercise discussed in-depth with patient who endorses understanding of the information and is agreement with plan. All pertinent questions answered  - Case discussed with interventional cardiologist, Dr Flores.   Patient cleared for discharge today from cardiology standpoint per Dr Marcos with follow up in one week for post discharge check-up       #Hypertension.    - on losartan 50mg at home, BP elevated 147/81- 160/85  - will resume home meds.    #Hyperlipidemia.   : - on atorvastatin 20mg at home,   -  increased dose to 80 mg in setting of abnormal stress test.    #Type 2 diabetes mellitus.    - on metformin 500mg BID, patient states that he take metformin PRN based on sugar levels   - start on low dose ISS qmeals and at bedtime   - HgbA1c - 6.3   64 y/o M with pmhx of Type 2 DM, HTN, HLD, hx of CVA (10 yrs ago, no residual deficits) who presents for chest pain. Found to have abnormal stress test concerning for ischemia, pending [facility]   #Angina pectoris.    - presents with midsternal chest pain with progressive worsening over the last 3 months  - trop 10-->11 , EKG NSR  - TTE with normal EF 60% with normal LV systolic fucntion   - Cards consulted in CDU, recommending NST  - NST positive with medium-sized, severe defect(s) in the apex and distal inferior walls that are reversible suggestive of ischemia with focal distal inferior and apical hypokinesis  - patient planned for [facility]   - monitor on tele, if active chest pain would repeat Graham and STAT EKG.  -6/23 CATH: akin frontier stent pRCA 80, RPLS 50-60; RRA access   ASA/Plavix  HDS, VSS  - EKG: SR, no acute ischemic changes  - A1C 6.3, LDL 64  - Access site stable w/o hematoma or bleed  - continue ASA 81mg daily, Plavix 75mg daily for DAPT for 1 year (please send script for DAPT, 30 day supply with 2 refills)  - continue Atorvastatin 80mg daily  - continue Losartan 50mg daily  - Hold Metformin for 3 days to prevent Lactic Acidosis, can restart on Friday, 6/27/25  - Monitor electrolytes, replete to keep K to 4 and Mg to 2    - Educated patient on importance of compliance on antiplatelet therapy.  Additionally, discharge medications, access site restrictions, dietary changes/exercise discussed in-depth with patient who endorses understanding of the information and is agreement with plan. All pertinent questions answered  - Case discussed with interventional cardiologist, Dr Flores - cleared for discharge   Patient cleared for discharge today from cardiology standpoint per Dr Marcos with follow up in one week for post discharge check-up          64 y/o M with pmhx of Type 2 DM, HTN, HLD, hx of CVA (10 yrs ago, no residual deficits) who presents for chest pain. Found to have abnormal stress test concerning for ischemia, pending [facility]   #Angina pectoris.    - presents with midsternal chest pain with progressive worsening over the last 3 months  - trop 10-->11 , EKG NSR  - TTE with normal EF 60% with normal LV systolic fucntion   - Cards consulted in CDU, recommending NST  - NST positive with medium-sized, severe defect(s) in the apex and distal inferior walls that are reversible suggestive of ischemia with focal distal inferior and apical hypokinesis  -6/23 CATH: akin frontier stent pRCA 80, RPLS 50-60; RRA access   ASA/Plavix  HDS, VSS  - EKG: SR, no acute ischemic changes  - A1C 6.3, LDL 64  - Access site stable w/o hematoma or bleed  - continue ASA 81mg daily, Plavix 75mg daily for DAPT for 1 year (please send script for DAPT, 30 day supply with 2 refills)  - continue Atorvastatin 80mg daily  - continue Losartan 50mg daily  - Hold Metformin for 3 days to prevent Lactic Acidosis, can restart on Friday, 6/27/25  - Monitor electrolytes, replete to keep K to 4 and Mg to 2    - Educated patient on importance of compliance on antiplatelet therapy.  Additionally, discharge medications, access site restrictions, dietary changes/exercise discussed in-depth with patient who endorses understanding of the information and is agreement with plan. All pertinent questions answered  - Case discussed with interventional cardiologist, Dr Flores - cleared for discharge   Patient cleared for discharge today from cardiology standpoint per Dr Marcos with follow up in one week for post discharge check-up

## 2025-06-24 NOTE — DISCHARGE NOTE PROVIDER - NSDCCPCAREPLAN_GEN_ALL_CORE_FT
PRINCIPAL DISCHARGE DIAGNOSIS  Diagnosis: Abnormal stress test  Assessment and Plan of Treatment: Please continue your medications as per cardiology, follow up with cardiology within on week of discharged. Please call your doctor immediately if you developed any chest pain or shortness of breath.      SECONDARY DISCHARGE DIAGNOSES  Diagnosis: Hypertension  Assessment and Plan of Treatment: Continue current blood pressure medication regimen as directed. Monitor for any visual changes, headaches or dizziness.  Monitor blood pressure regularly.  Follow up with your PCP for further management for high blood pressure, please call to make appointment within 1 week of discharge      Diagnosis: Hyperlipidemia  Assessment and Plan of Treatment: Lizy continue your medication as per cardiologist recommendation.    Diagnosis: Hypertension  Assessment and Plan of Treatment:      PRINCIPAL DISCHARGE DIAGNOSIS  Diagnosis: Abnormal stress test  Assessment and Plan of Treatment: Please continue your medications as per cardiology, follow up with cardiology within on week of discharged. Please call your doctor immediately if you developed any chest pain or shortness of breath.  Hold Metformin for 3 days and can restart on Friday 6/27/25.      SECONDARY DISCHARGE DIAGNOSES  Diagnosis: Hypertension  Assessment and Plan of Treatment: Continue current blood pressure medication regimen as directed. Monitor for any visual changes, headaches or dizziness.  Monitor blood pressure regularly.  Follow up with your PCP for further management for high blood pressure, please call to make appointment within 1 week of discharge      Diagnosis: Hyperlipidemia  Assessment and Plan of Treatment: Plesae continue your medication as per cardiologist recommendation.    Diagnosis: Hypertension  Assessment and Plan of Treatment:

## 2025-06-24 NOTE — DISCHARGE NOTE NURSING/CASE MANAGEMENT/SOCIAL WORK - FINANCIAL ASSISTANCE
[facility] provides services at a reduced cost to those who are determined to be eligible through [facility]’s financial assistance program. Information regarding [facility]’s financial assistance program can be found by going to [email or web address] or by calling [phone].

## 2025-06-24 NOTE — DISCHARGE NOTE NURSING/CASE MANAGEMENT/SOCIAL WORK - NSDCPETBCESMAN_GEN_ALL_CORE
DC instructions If you are a smoker, it is important for your health to stop smoking. Please be aware that second hand smoke is also harmful.

## 2025-06-24 NOTE — PROGRESS NOTE ADULT - ASSESSMENT
66 y/o M, PMHx DM-2, HTN, HLD, hx of CVA (10 yrs ago, no residual deficits) who presents for chest pain. Found to have abnormal stress test concerning for ischemia.  Patient underwent C 6/23: s/p [place] frontier stent pRCA (80%).    # CAD, s/p PCI  - HDS, VSS  - Trops 10 >11  - EKG: SR, no acute ischemic changes  - A1C 6.3, LDL 64  - TTE 6/22: EF 60% with normal LV systolic function   - NST 6/22:  positive with medium-sized, severe defect(s) in the apex and distal inferior walls that are reversible suggestive of ischemia with focal distal inferior and apical hypokinesis  - LHC 6/23: [place] frontier stent pRCA (80%), RPLS 50-60%; RRA access   - Access site stable w/o hematoma or bleed  - continue ASA 81mg daily, Plavix 75mg daily for DAPT for 1 year (please send script for DAPT, 30 day supply with 2 refills)  - continue Atorvastatin 80mg daily  - continue Losartan 50mg daily  - Hold Metformin for 3 days to prevent Lactic Acidosis, can restart on Friday, 6/27/25  - Monitor electrolytes, replete to keep K to 4 and Mg to 2    - Educated patient on importance of compliance on antiplatelet therapy.  Additionally, discharge medications, access site restrictions, dietary changes/exercise discussed in-depth with patient who endorses understanding of the information and is agreement with plan. All pertinent questions answered  - Case discussed with interventional cardiologist, Dr Flores.   Patient recommended to follow up with outpatient cardiologist within 2 weeks of discharge    Cardiac Rehabilitation Referral (CAD/Post PCI):  Provided   - Education on benefits of cardiac rehab provided to patient: Yes  - Referral and prescription given for cardiac rehab: Yes  - Patient given a list of locations and instructed to contact their insurance company to review list of participating providers: Yes  - Patient instructed to bring cardiac rehab prescription with them to cardiology follow up appointment for assistance with enrollment: Yes  - Patient discharged with copies detailing cardiovascular history, medications, testing/treatments: Yes             64 y/o M, PMHx DM-2, HTN, HLD, hx of CVA (10 yrs ago, no residual deficits) who presents for chest pain. Found to have abnormal stress test concerning for ischemia.  Patient underwent LHC 6/23: s/p [place] frontier stent pRCA (80%).    # CAD, s/p PCI  - HDS, VSS  - Trops 10 >11  - EKG: SR, no acute ischemic changes  - A1C 6.3, LDL 64  - TTE 6/22: EF 60% with normal LV systolic function   - NST 6/22:  positive with medium-sized, severe defect(s) in the apex and distal inferior walls that are reversible suggestive of ischemia with focal distal inferior and apical hypokinesis  - LHC 6/23: [place] frontier stent pRCA (80%), RPLS 50-60%; RRA access   - Access site stable w/o hematoma or bleed  - continue ASA 81mg daily, Plavix 75mg daily for DAPT for 1 year (please send script for DAPT, 30 day supply with 2 refills)  - continue Atorvastatin 80mg daily  - continue Losartan 50mg daily  - Hold Metformin for 3 days to prevent Lactic Acidosis, can restart on Friday, 6/27/25  - Monitor electrolytes, replete to keep K to 4 and Mg to 2    - Educated patient on importance of compliance on antiplatelet therapy.  Additionally, discharge medications, access site restrictions, dietary changes/exercise discussed in-depth with patient who endorses understanding of the information and is agreement with plan. All pertinent questions answered  - Case discussed with interventional cardiologist, Dr Flores.   Patient cleared for discharge today from cardiology standpoint per Dr Marcos with follow up in one week for post discharge check-up     Cardiac Rehabilitation Referral (CAD/Post PCI):  Provided   - Education on benefits of cardiac rehab provided to patient: Yes  - Referral and prescription given for cardiac rehab: Yes  - Patient given a list of locations and instructed to contact their insurance company to review list of participating providers: Yes  - Patient instructed to bring cardiac rehab prescription with them to cardiology follow up appointment for assistance with enrollment: Yes  - Patient discharged with copies detailing cardiovascular history, medications, testing/treatments: Yes

## 2025-06-24 NOTE — DISCHARGE NOTE PROVIDER - NSDCMRMEDTOKEN_GEN_ALL_CORE_FT
aspirin 81 mg oral delayed release tablet: 1 tab(s) orally once a day  atorvastatin 20 mg oral tablet: 1 tab(s) orally once a day  losartan 50 mg oral tablet: 1 tab(s) orally once a day  HOLD for blood pressure less than 100  metFORMIN 500 mg oral tablet: 1 tab(s) orally 2 times a day  tamsulosin 0.4 mg oral capsule: 2 cap(s) orally once a day (at bedtime)   aspirin 81 mg oral delayed release tablet: 1 tab(s) orally once a day  atorvastatin 80 mg oral tablet: 1 tab(s) orally once a day (at bedtime)  Cardiac Rehab 2-3 times weekly x 12 weeks: Stent to the Right Coronary Artery  clopidogrel 75 mg oral tablet: 1 tab(s) orally once a day  losartan 50 mg oral tablet: 1 tab(s) orally once a day  HOLD for blood pressure less than 100  metFORMIN 500 mg oral tablet: 1 tab(s) orally 2 times a day RESTART on Friday 6/27/25  pantoprazole 40 mg oral delayed release tablet: 1 tab(s) orally once a day (before a meal)  tamsulosin 0.4 mg oral capsule: 2 cap(s) orally once a day (at bedtime)

## 2025-06-24 NOTE — DISCHARGE NOTE NURSING/CASE MANAGEMENT/SOCIAL WORK - PATIENT PORTAL LINK FT
You can access the FollowMyHealth Patient Portal offered by [facility] by registering at the following website: [email or web address]. By joining [employer]s FollowMyHealth portal, you will also be able to view your health information using other applications (apps) compatible with our system.

## 2025-06-24 NOTE — PROGRESS NOTE ADULT - SUBJECTIVE AND OBJECTIVE BOX
Incomplete    Patient seen and examined at bedside. HDS.  Pt currently denies CP, SOB, palpitations, diaphoresis, dizziness, Syncope, LE swelling, acute bleed or any other complaints at this time    - No events on telemetry overnight, remains in sinus rhythm      PERTINENT REVIEW OF SYSTEMS:  Constitutional:     [ ] negative [ ] fevers [ ] chills [ ] weight loss [ ] weight gain  CV:                         [ ] negative  [ ] chest pain [ ] orthopnea [ ] palpitations [ ] murmur  Resp:                     [ ] negative [ ] cough [ ] shortness of breath [ ] dyspnea [ ] wheezing [ ] sputum [ ]hemoptysis  GI:                          [ ] negative [ ] nausea [ ] vomiting [ ] diarrhea [ ] constipation [ ] abd pain [ ] dysphagia   Skin:                       [ ] negative [ ] rash [ ] itch  Neurological:        [ ] negative [ ] headache [ ] dizziness [ ] syncope [ ] weakness [ ] numbness  Psychiatric:           [ ] negative [ ] anxiety [ ] depression  Endocrine:            [ ] negative [ ] diabetes [ ] thyroid problem  Heme/Lymph:      [ ] negative [ ] anemia [ ] bleeding problem  Allergic/Immune: [ ] negative [ ] itchy eyes [ ] nasal discharge [ ] hives [ ] angioedema    [x] All other systems negative  [ ] Unable to assess ROS due to    Home Medications  Home Medications:  losartan 50 mg oral tablet: 1 tab(s) orally once a day  HOLD for blood pressure less than 100 (2025 16:37)  metFORMIN 500 mg oral tablet: 1 tab(s) orally 2 times a day (2025 16:37)      Current Meds:  acetaminophen     Tablet .. 650 milliGRAM(s) Oral every 6 hours PRN  aspirin enteric coated 81 milliGRAM(s) Oral daily  atorvastatin 80 milliGRAM(s) Oral at bedtime  clopidogrel Tablet 75 milliGRAM(s) Oral daily  dextrose 5%. 1000 milliLiter(s) IV Continuous <Continuous>  dextrose 5%. 1000 milliLiter(s) IV Continuous <Continuous>  dextrose 50% Injectable 25 Gram(s) IV Push once  dextrose 50% Injectable 12.5 Gram(s) IV Push once  dextrose 50% Injectable 25 Gram(s) IV Push once  dextrose Oral Gel 15 Gram(s) Oral once PRN  glucagon  Injectable 1 milliGRAM(s) IntraMuscular once  insulin lispro (ADMELOG) corrective regimen sliding scale   SubCutaneous three times a day before meals  losartan 50 milliGRAM(s) Oral daily  melatonin 3 milliGRAM(s) Oral at bedtime PRN  pantoprazole    Tablet 40 milliGRAM(s) Oral before breakfast  sodium chloride 0.9%. 500 milliLiter(s) IV Continuous <Continuous>    PAST MEDICAL & SURGICAL HISTORY:  Hypertension, unspecified type      Hypercholesteremia      Diabetes mellitus      CAD (coronary artery disease)      History of CVA in adulthood      No significant past surgical history      Vitals:  T(F): 98.8 (-), Max: 98.8 (-)  HR: 65 () (53 - 72)  BP: 157/91 () (139/65 - 163/74)  RR: 19 (-)  SpO2: 97% ()  I&O's Summary    2025 07:01  -  2025 07:00  --------------------------------------------------------  IN: 800 mL / OUT: 1200 mL / NET: -400 mL    Physical Exam:  Appearance: No acute distress; well appearing  Neck: Supple, no JVD B/L, no Carotid Bruit B/L  Cardiovascular: RRR, S1, S2, no murmurs, rubs, or gallops, no edema  Respiratory: Clear to auscultation bilaterally, no RRW B/L  Gastrointestinal: soft, non-tender, non-distended with normal bowel sounds  Neurologic: No focal or neuro deficits noted  Psychiatry: AAOx3, mood & affect appropriate  Extremities:   No LE swelling bilaterally, palpable pulses throughout  Access Site: RRA. Stable, C/D/I, w/o hematoma or bleeding, pulses intact, back to baseline                          13.1   7.21  )-----------( 330      ( 2025 06:16 )             41.0     -24    137  |  104  |  18  ----------------------------<  114[H]  4.1   |  23  |  0.86    Ca    9.5      2025 06:16  Phos  3.1     -24  Mg     2.00     -    TPro  7.4  /  Alb  3.7  /  TBili  0.4  /  DBili  x   /  AST  9   /  ALT  10  /  AlkPhos  71      Cardiac Imaging  ===========    < from: Nuclear Stress Test-Exercise.. (25 @ 07:02) >    Conclusions:     1. Myocardial Perfusion: Abnormal.   2. Qualitative Perfusion:      - medium-sized, severe defect(s) in the apex and distal inferior walls that are reversible suggestive of ischemia.   3. There is focal distal inferior and apical hypokinesis; the overall LVEF is preserved.   4. The post stress left ventricular EF is 68 %. The stress end diastolic volume is 52 ml and systolic volume is 22 ml.   5. Stress EC.5 mm horizontal to downsloping ST depression in leads II, III, AVF, V5, V6 and AVR starting at 6:55 minutes during stress at 122 bpm and persisting 10:34 minutes into recovery.    ===============================================================    < from: TTE W or WO Ultrasound Enhancing Agent (25 @ 07:22) >    CONCLUSIONS:      1. Left ventricular cavity is small. Left ventricular systolic function is normal with an ejection fraction of 60 % by 3D.   2. Normal right ventricular cavity size and normal right ventricular systolic function.   3. Left atrium is normal in size.   4. No pericardial effusion seen.   5. The inferior vena cava is normal in size measuring 1.47 cm in diameter, (normal <2.1cm) with normal inspiratory collapse (normal >50%) consistent with normal right atrial pressure (~3, range 0-5mmHg).   6. No prior echocardiogram is available for comparison.    ________________________________________________________________________________________    Interpretation of Telemetry: Sinus Rhythm   Patient seen and examined at bedside. HDS.  Pt currently denies CP, SOB, palpitations, diaphoresis, dizziness, Syncope, LE swelling, acute bleed or any other complaints at this time    - No events on telemetry overnight, remains in sinus rhythm      PERTINENT REVIEW OF SYSTEMS:  Constitutional:     [ ] negative [ ] fevers [ ] chills [ ] weight loss [ ] weight gain  CV:                         [ ] negative  [ ] chest pain [ ] orthopnea [ ] palpitations [ ] murmur  Resp:                     [ ] negative [ ] cough [ ] shortness of breath [ ] dyspnea [ ] wheezing [ ] sputum [ ]hemoptysis  GI:                          [ ] negative [ ] nausea [ ] vomiting [ ] diarrhea [ ] constipation [ ] abd pain [ ] dysphagia   Skin:                       [ ] negative [ ] rash [ ] itch  Neurological:        [ ] negative [ ] headache [ ] dizziness [ ] syncope [ ] weakness [ ] numbness  Psychiatric:           [ ] negative [ ] anxiety [ ] depression  Endocrine:            [ ] negative [ ] diabetes [ ] thyroid problem  Heme/Lymph:      [ ] negative [ ] anemia [ ] bleeding problem  Allergic/Immune: [ ] negative [ ] itchy eyes [ ] nasal discharge [ ] hives [ ] angioedema    [x] All other systems negative  [ ] Unable to assess ROS due to    Home Medications:  losartan 50 mg oral tablet: 1 tab(s) orally once a day  HOLD for blood pressure less than 100 (2025 16:37)  metFORMIN 500 mg oral tablet: 1 tab(s) orally 2 times a day (2025 16:37)      Current Meds:  acetaminophen     Tablet .. 650 milliGRAM(s) Oral every 6 hours PRN  aspirin enteric coated 81 milliGRAM(s) Oral daily  atorvastatin 80 milliGRAM(s) Oral at bedtime  clopidogrel Tablet 75 milliGRAM(s) Oral daily  dextrose 5%. 1000 milliLiter(s) IV Continuous <Continuous>  dextrose 5%. 1000 milliLiter(s) IV Continuous <Continuous>  dextrose 50% Injectable 25 Gram(s) IV Push once  dextrose 50% Injectable 12.5 Gram(s) IV Push once  dextrose 50% Injectable 25 Gram(s) IV Push once  dextrose Oral Gel 15 Gram(s) Oral once PRN  glucagon  Injectable 1 milliGRAM(s) IntraMuscular once  insulin lispro (ADMELOG) corrective regimen sliding scale   SubCutaneous three times a day before meals  losartan 50 milliGRAM(s) Oral daily  melatonin 3 milliGRAM(s) Oral at bedtime PRN  pantoprazole    Tablet 40 milliGRAM(s) Oral before breakfast  sodium chloride 0.9%. 500 milliLiter(s) IV Continuous <Continuous>    PAST MEDICAL & SURGICAL HISTORY:  Hypertension, unspecified type      Hypercholesteremia      Diabetes mellitus      CAD (coronary artery disease)      History of CVA in adulthood      No significant past surgical history      Vitals:  T(F): 98.8 (-), Max: 98.8 ()  HR: 65 () (53 - 72)  BP: 157/91 () (139/65 - 163/74)  RR: 19 ()  SpO2: 97% ()  I&O's Summary    2025 07:01  -  2025 07:00  --------------------------------------------------------  IN: 800 mL / OUT: 1200 mL / NET: -400 mL    Physical Exam:  Appearance: No acute distress; well appearing  Neck: Supple, no JVD B/L, no Carotid Bruit B/L  Cardiovascular: RRR, S1, S2, no murmurs, rubs, or gallops, no edema  Respiratory: Clear to auscultation bilaterally, no RRW B/L  Gastrointestinal: soft, non-tender, non-distended with normal bowel sounds  Neurologic: No focal or neuro deficits noted  Psychiatry: AAOx3, mood & affect appropriate  Extremities:   No LE swelling bilaterally, palpable pulses throughout  Access Site: RRA. Stable, C/D/I, w/o hematoma or bleeding, pulses intact, back to baseline                          13.1   7.21  )-----------( 330      ( 2025 06:16 )             41.0     -    137  |  104  |  18  ----------------------------<  114[H]  4.1   |  23  |  0.86    Ca    9.5      2025 06:16  Phos  3.1     -24  Mg     2.00     -    TPro  7.4  /  Alb  3.7  /  TBili  0.4  /  DBili  x   /  AST  9   /  ALT  10  /  AlkPhos  71      Cardiac Imaging  ===========    < from: Nuclear Stress Test-Exercise.. (25 @ 07:02) >    Conclusions:     1. Myocardial Perfusion: Abnormal.   2. Qualitative Perfusion:      - medium-sized, severe defect(s) in the apex and distal inferior walls that are reversible suggestive of ischemia.   3. There is focal distal inferior and apical hypokinesis; the overall LVEF is preserved.   4. The post stress left ventricular EF is 68 %. The stress end diastolic volume is 52 ml and systolic volume is 22 ml.   5. Stress EC.5 mm horizontal to downsloping ST depression in leads II, III, AVF, V5, V6 and AVR starting at 6:55 minutes during stress at 122 bpm and persisting 10:34 minutes into recovery.    ===============================================================    < from: TTE W or WO Ultrasound Enhancing Agent (25 @ 07:22) >    CONCLUSIONS:      1. Left ventricular cavity is small. Left ventricular systolic function is normal with an ejection fraction of 60 % by 3D.   2. Normal right ventricular cavity size and normal right ventricular systolic function.   3. Left atrium is normal in size.   4. No pericardial effusion seen.   5. The inferior vena cava is normal in size measuring 1.47 cm in diameter, (normal <2.1cm) with normal inspiratory collapse (normal >50%) consistent with normal right atrial pressure (~3, range 0-5mmHg).   6. No prior echocardiogram is available for comparison.    ________________________________________________________________________________________    Interpretation of Telemetry: Sinus Rhythm

## 2025-06-24 NOTE — DISCHARGE NOTE PROVIDER - CARE PROVIDER_API CALL
[facility]  [facility]  [address] [place] [address], [address]  [place] [address]  Phone: [phone]  Fax: [phone]  Follow Up Time:     Ganesh Marcos  Cardiovascular Disease  [address] [place] [address]  Phone: [phone]  Follow Up Time:

## 2025-06-24 NOTE — PROGRESS NOTE ADULT - SUBJECTIVE AND OBJECTIVE BOX
Cardiovascular Disease Progress Note  DATE OF SERVICE: 06-24-25 @ 10:08    Overnight events: No acute events overnight.   The patient denies chest pain or SOB.    Otherwise review of systems negative    Objective Findings:  T(C): 36.9 (06-24-25 @ 09:39), Max: 37.1 (06-24-25 @ 06:00)  HR: 70 (06-24-25 @ 09:39) (53 - 72)  BP: 136/77 (06-24-25 @ 09:39) (136/77 - 163/74)  RR: 18 (06-24-25 @ 09:39) (12 - 20)  SpO2: 98% (06-24-25 @ 09:39) (96% - 98%)  Wt(kg): --  Daily Height in cm: 160.02 (23 Jun 2025 11:18)    Daily       Physical Exam:  Gen: NAD; Patient resting comfortably  HEENT: EOMI, Normocephalic/ atraumatic  CV: RRR, normal S1 + S2, no m/r/g  Lungs:  Normal respiratory effort; clear to auscultation bilaterally  Abd: soft, non-tender; bowel sounds present  Ext: No edema; warm and well perfused    Telemetry: no ectopy    Laboratory Data:                        13.1   7.21  )-----------( 330      ( 24 Jun 2025 06:16 )             41.0     06-24    137  |  104  |  18  ----------------------------<  114[H]  4.1   |  23  |  0.86    Ca    9.5      24 Jun 2025 06:16  Phos  3.1     06-24  Mg     2.00     06-24    TPro  7.4  /  Alb  3.7  /  TBili  0.4  /  DBili  x   /  AST  9   /  ALT  10  /  AlkPhos  71  06-23              Inpatient Medications:  MEDICATIONS  (STANDING):  aspirin enteric coated 81 milliGRAM(s) Oral daily  atorvastatin 80 milliGRAM(s) Oral at bedtime  clopidogrel Tablet 75 milliGRAM(s) Oral daily  dextrose 5%. 1000 milliLiter(s) (100 mL/Hr) IV Continuous <Continuous>  dextrose 5%. 1000 milliLiter(s) (50 mL/Hr) IV Continuous <Continuous>  dextrose 50% Injectable 25 Gram(s) IV Push once  dextrose 50% Injectable 12.5 Gram(s) IV Push once  dextrose 50% Injectable 25 Gram(s) IV Push once  glucagon  Injectable 1 milliGRAM(s) IntraMuscular once  insulin lispro (ADMELOG) corrective regimen sliding scale   SubCutaneous three times a day before meals  losartan 50 milliGRAM(s) Oral daily  pantoprazole    Tablet 40 milliGRAM(s) Oral before breakfast  sodium chloride 0.9%. 500 milliLiter(s) (100 mL/Hr) IV Continuous <Continuous>      Assessment: 65 year old man with DM, HTN, HLD, CAD, and CVA presents with chest pain.     Plan of Care:    #Chest pain-  No acute findings on echo, but NST is notable for inferior inducible ischemia.  s/p ALEJANDRO to RCA on 6/23/2025  RRA site soft and NT  DAPT and statin.   Discharge planning today.  The patient is advised to f/u in one week.      #HTN-  BP acceptable.            Over 55 minutes spent on total encounter; 100% of the visit was spent counseling and/or coordinating care by the attending physician.      Raymon Marcos MD [facility]  Cardiovascular Disease  [phone]
